# Patient Record
Sex: MALE | Race: BLACK OR AFRICAN AMERICAN | NOT HISPANIC OR LATINO
[De-identification: names, ages, dates, MRNs, and addresses within clinical notes are randomized per-mention and may not be internally consistent; named-entity substitution may affect disease eponyms.]

---

## 2018-01-09 ENCOUNTER — TRANSCRIPTION ENCOUNTER (OUTPATIENT)
Age: 58
End: 2018-01-09

## 2018-11-10 ENCOUNTER — TRANSCRIPTION ENCOUNTER (OUTPATIENT)
Age: 58
End: 2018-11-10

## 2018-12-21 ENCOUNTER — TRANSCRIPTION ENCOUNTER (OUTPATIENT)
Age: 58
End: 2018-12-21

## 2018-12-26 ENCOUNTER — EMERGENCY (EMERGENCY)
Facility: HOSPITAL | Age: 58
LOS: 0 days | Discharge: HOME | End: 2018-12-27
Attending: EMERGENCY MEDICINE | Admitting: EMERGENCY MEDICINE
Payer: COMMERCIAL

## 2018-12-26 VITALS
DIASTOLIC BLOOD PRESSURE: 86 MMHG | RESPIRATION RATE: 21 BRPM | OXYGEN SATURATION: 96 % | SYSTOLIC BLOOD PRESSURE: 164 MMHG | TEMPERATURE: 99 F | HEIGHT: 69 IN | HEART RATE: 104 BPM | WEIGHT: 250 LBS

## 2018-12-26 DIAGNOSIS — Z79.51 LONG TERM (CURRENT) USE OF INHALED STEROIDS: ICD-10-CM

## 2018-12-26 DIAGNOSIS — Z79.52 LONG TERM (CURRENT) USE OF SYSTEMIC STEROIDS: ICD-10-CM

## 2018-12-26 DIAGNOSIS — K21.9 GASTRO-ESOPHAGEAL REFLUX DISEASE WITHOUT ESOPHAGITIS: ICD-10-CM

## 2018-12-26 DIAGNOSIS — R74.0 NONSPECIFIC ELEVATION OF LEVELS OF TRANSAMINASE AND LACTIC ACID DEHYDROGENASE [LDH]: ICD-10-CM

## 2018-12-26 DIAGNOSIS — E11.9 TYPE 2 DIABETES MELLITUS WITHOUT COMPLICATIONS: ICD-10-CM

## 2018-12-26 DIAGNOSIS — R07.9 CHEST PAIN, UNSPECIFIED: ICD-10-CM

## 2018-12-26 DIAGNOSIS — I10 ESSENTIAL (PRIMARY) HYPERTENSION: ICD-10-CM

## 2018-12-26 DIAGNOSIS — R05 COUGH: ICD-10-CM

## 2018-12-26 DIAGNOSIS — Z79.02 LONG TERM (CURRENT) USE OF ANTITHROMBOTICS/ANTIPLATELETS: ICD-10-CM

## 2018-12-26 LAB
ALBUMIN SERPL ELPH-MCNC: 3.5 G/DL — SIGNIFICANT CHANGE UP (ref 3.3–5)
ALP SERPL-CCNC: 185 U/L — HIGH (ref 40–120)
ALT FLD-CCNC: 98 U/L — HIGH (ref 12–78)
ANION GAP SERPL CALC-SCNC: 9 MMOL/L — SIGNIFICANT CHANGE UP (ref 5–17)
AST SERPL-CCNC: 77 U/L — HIGH (ref 15–37)
BASOPHILS # BLD AUTO: 0.02 K/UL — SIGNIFICANT CHANGE UP (ref 0–0.2)
BASOPHILS NFR BLD AUTO: 0.2 % — SIGNIFICANT CHANGE UP (ref 0–2)
BILIRUB SERPL-MCNC: 0.3 MG/DL — SIGNIFICANT CHANGE UP (ref 0.2–1.2)
BUN SERPL-MCNC: 17 MG/DL — SIGNIFICANT CHANGE UP (ref 7–23)
CALCIUM SERPL-MCNC: 8.5 MG/DL — SIGNIFICANT CHANGE UP (ref 8.5–10.1)
CHLORIDE SERPL-SCNC: 108 MMOL/L — SIGNIFICANT CHANGE UP (ref 96–108)
CO2 SERPL-SCNC: 23 MMOL/L — SIGNIFICANT CHANGE UP (ref 22–31)
CREAT SERPL-MCNC: 1.37 MG/DL — HIGH (ref 0.5–1.3)
EOSINOPHIL # BLD AUTO: 0 K/UL — SIGNIFICANT CHANGE UP (ref 0–0.5)
EOSINOPHIL NFR BLD AUTO: 0 % — SIGNIFICANT CHANGE UP (ref 0–6)
GLUCOSE SERPL-MCNC: 278 MG/DL — HIGH (ref 70–99)
HCT VFR BLD CALC: 40.2 % — SIGNIFICANT CHANGE UP (ref 39–50)
HGB BLD-MCNC: 13.3 G/DL — SIGNIFICANT CHANGE UP (ref 13–17)
IMM GRANULOCYTES NFR BLD AUTO: 0.8 % — SIGNIFICANT CHANGE UP (ref 0–1.5)
INR BLD: 1.06 RATIO — SIGNIFICANT CHANGE UP (ref 0.88–1.16)
LYMPHOCYTES # BLD AUTO: 1.46 K/UL — SIGNIFICANT CHANGE UP (ref 1–3.3)
LYMPHOCYTES # BLD AUTO: 12.2 % — LOW (ref 13–44)
MCHC RBC-ENTMCNC: 27 PG — SIGNIFICANT CHANGE UP (ref 27–34)
MCHC RBC-ENTMCNC: 33.1 GM/DL — SIGNIFICANT CHANGE UP (ref 32–36)
MCV RBC AUTO: 81.5 FL — SIGNIFICANT CHANGE UP (ref 80–100)
MONOCYTES # BLD AUTO: 0.82 K/UL — SIGNIFICANT CHANGE UP (ref 0–0.9)
MONOCYTES NFR BLD AUTO: 6.8 % — SIGNIFICANT CHANGE UP (ref 2–14)
NEUTROPHILS # BLD AUTO: 9.6 K/UL — HIGH (ref 1.8–7.4)
NEUTROPHILS NFR BLD AUTO: 80 % — HIGH (ref 43–77)
NRBC # BLD: 0 /100 WBCS — SIGNIFICANT CHANGE UP (ref 0–0)
PLATELET # BLD AUTO: 300 K/UL — SIGNIFICANT CHANGE UP (ref 150–400)
POTASSIUM SERPL-MCNC: 4.4 MMOL/L — SIGNIFICANT CHANGE UP (ref 3.5–5.3)
POTASSIUM SERPL-SCNC: 4.4 MMOL/L — SIGNIFICANT CHANGE UP (ref 3.5–5.3)
PROT SERPL-MCNC: 7 GM/DL — SIGNIFICANT CHANGE UP (ref 6–8.3)
PROTHROM AB SERPL-ACNC: 11.8 SEC — SIGNIFICANT CHANGE UP (ref 10–12.9)
RBC # BLD: 4.93 M/UL — SIGNIFICANT CHANGE UP (ref 4.2–5.8)
RBC # FLD: 14.6 % — HIGH (ref 10.3–14.5)
SODIUM SERPL-SCNC: 140 MMOL/L — SIGNIFICANT CHANGE UP (ref 135–145)
TROPONIN I SERPL-MCNC: 0.02 NG/ML — SIGNIFICANT CHANGE UP (ref 0.01–0.04)
TROPONIN I SERPL-MCNC: <0.015 NG/ML — SIGNIFICANT CHANGE UP (ref 0.01–0.04)
WBC # BLD: 11.99 K/UL — HIGH (ref 3.8–10.5)
WBC # FLD AUTO: 11.99 K/UL — HIGH (ref 3.8–10.5)

## 2018-12-26 PROCEDURE — 71046 X-RAY EXAM CHEST 2 VIEWS: CPT | Mod: 26

## 2018-12-26 PROCEDURE — 99284 EMERGENCY DEPT VISIT MOD MDM: CPT

## 2018-12-26 PROCEDURE — 93010 ELECTROCARDIOGRAM REPORT: CPT

## 2018-12-26 RX ORDER — IPRATROPIUM/ALBUTEROL SULFATE 18-103MCG
3 AEROSOL WITH ADAPTER (GRAM) INHALATION ONCE
Qty: 0 | Refills: 0 | Status: COMPLETED | OUTPATIENT
Start: 2018-12-26 | End: 2018-12-26

## 2018-12-26 RX ADMIN — Medication 3 MILLILITER(S): at 20:29

## 2018-12-26 NOTE — ED STATDOCS - MEDICAL DECISION MAKING DETAILS
Pt with viral syndrome.  Hyperglycemia, no DKA.  And mild transaminitis.  CXR clear.  Troponin x 2 negative.  EKG just sinus tachy, nonischemic.  Given duoneb with improvement in cough and breathing.  Chest pain likely muscular from coughing.  Discussed with pt's PCP, plan to continue meds, f/u as outpatient.

## 2018-12-26 NOTE — ED STATDOCS - PROGRESS NOTE DETAILS
Patient seen and evaluated, ED attending note and orders reviewed, will continue with patient follow up and care -Ashley Ramos PA-C PCP Dr. Gray [phone] would like to be called with results of troponin.    Ashley Ramos PA-C troponin 1 negative, will await second troponin   Ashley Ramos PA-C second troponin negative.  Dr. Ozuna spoke with patient pmd about transaminitis, he will repeat tests outpatient.  reviewed all results with patient as well as return precatuions -Red Carranza PA-C

## 2018-12-26 NOTE — ED ADULT TRIAGE NOTE - CHIEF COMPLAINT QUOTE
pt ambulatory to ED, states dyspnea , chest pain and congestion since last night worsening today. states he went to PMD and was dx w/ RSV.

## 2018-12-26 NOTE — ED STATDOCS - OBJECTIVE STATEMENT
57 y/o male with a PMHx of DM, sleep apnea, HTN, GERD presents to the ED c/o congestion, rhinorrhea, and body aches x1 week. Pt notes he was recently diagnosed with RSV by his PCP. Pt reports he had onset of some chest tightness and SOB starting yesterday, worsening today. Denies decreased PO intake. Pt states he has been having HAs but none today. Pt called PCP who advised to come to the ED. Pt has been taking Mucinex PM, pearls, Azithromycin, nebulizer, albuterol Last CXR last week. Also states an elevated BGM of 267, normally at 120. PCP Dr. Gray [phone]

## 2018-12-26 NOTE — ED ADULT NURSE NOTE - OBJECTIVE STATEMENT
pt presents to ed with chest pain/sob when coughing. pt was diagnosed with RSV at his pulmonologist. pt has history of asthma. pt at moment endorses chest congestion. pt denies dizziness/n/v. pt appears well, is not diaphoretic. pt presents to ed with chest pain/sob when coughing since last night. pt was diagnosed with RSV at his pulmonologist. pt has history of asthma. pt at moment endorses chest congestion. pt denies dizziness/n/v. pt appears well, is not diaphoretic.

## 2018-12-27 VITALS
HEART RATE: 90 BPM | SYSTOLIC BLOOD PRESSURE: 141 MMHG | DIASTOLIC BLOOD PRESSURE: 70 MMHG | OXYGEN SATURATION: 96 % | TEMPERATURE: 98 F | RESPIRATION RATE: 17 BRPM

## 2019-01-19 ENCOUNTER — INPATIENT (INPATIENT)
Facility: HOSPITAL | Age: 59
LOS: 1 days | Discharge: HOME | DRG: 159 | End: 2019-01-21
Attending: INTERNAL MEDICINE | Admitting: INTERNAL MEDICINE
Payer: COMMERCIAL

## 2019-01-19 VITALS
DIASTOLIC BLOOD PRESSURE: 89 MMHG | WEIGHT: 250 LBS | HEART RATE: 99 BPM | HEIGHT: 70 IN | SYSTOLIC BLOOD PRESSURE: 142 MMHG | OXYGEN SATURATION: 96 % | RESPIRATION RATE: 20 BRPM | TEMPERATURE: 98 F

## 2019-01-19 DIAGNOSIS — G47.33 OBSTRUCTIVE SLEEP APNEA (ADULT) (PEDIATRIC): ICD-10-CM

## 2019-01-19 DIAGNOSIS — J02.9 ACUTE PHARYNGITIS, UNSPECIFIED: ICD-10-CM

## 2019-01-19 DIAGNOSIS — J45.991 COUGH VARIANT ASTHMA: ICD-10-CM

## 2019-01-19 DIAGNOSIS — Z90.49 ACQUIRED ABSENCE OF OTHER SPECIFIED PARTS OF DIGESTIVE TRACT: Chronic | ICD-10-CM

## 2019-01-19 DIAGNOSIS — J38.7 OTHER DISEASES OF LARYNX: ICD-10-CM

## 2019-01-19 DIAGNOSIS — I10 ESSENTIAL (PRIMARY) HYPERTENSION: ICD-10-CM

## 2019-01-19 DIAGNOSIS — E11.65 TYPE 2 DIABETES MELLITUS WITH HYPERGLYCEMIA: ICD-10-CM

## 2019-01-19 DIAGNOSIS — R94.31 ABNORMAL ELECTROCARDIOGRAM [ECG] [EKG]: ICD-10-CM

## 2019-01-19 PROBLEM — K21.9 GASTRO-ESOPHAGEAL REFLUX DISEASE WITHOUT ESOPHAGITIS: Chronic | Status: ACTIVE | Noted: 2018-12-27

## 2019-01-19 LAB
ALBUMIN SERPL ELPH-MCNC: 3.8 G/DL — SIGNIFICANT CHANGE UP (ref 3.3–5)
ALP SERPL-CCNC: 141 U/L — HIGH (ref 40–120)
ALT FLD-CCNC: 33 U/L — SIGNIFICANT CHANGE UP (ref 10–45)
ANION GAP SERPL CALC-SCNC: 11 MMOL/L — SIGNIFICANT CHANGE UP (ref 5–17)
APTT BLD: 28.6 SEC — SIGNIFICANT CHANGE UP (ref 27.5–36.3)
AST SERPL-CCNC: 17 U/L — SIGNIFICANT CHANGE UP (ref 10–40)
B-OH-BUTYR SERPL-SCNC: 0 MMOL/L — SIGNIFICANT CHANGE UP
BASOPHILS # BLD AUTO: 0 K/UL — SIGNIFICANT CHANGE UP (ref 0–0.2)
BASOPHILS NFR BLD AUTO: 0.5 % — SIGNIFICANT CHANGE UP (ref 0–2)
BILIRUB SERPL-MCNC: 0.2 MG/DL — SIGNIFICANT CHANGE UP (ref 0.2–1.2)
BUN SERPL-MCNC: 17 MG/DL — SIGNIFICANT CHANGE UP (ref 7–23)
CALCIUM SERPL-MCNC: 9.2 MG/DL — SIGNIFICANT CHANGE UP (ref 8.4–10.5)
CHLORIDE SERPL-SCNC: 104 MMOL/L — SIGNIFICANT CHANGE UP (ref 96–108)
CO2 SERPL-SCNC: 27 MMOL/L — SIGNIFICANT CHANGE UP (ref 22–31)
CREAT SERPL-MCNC: 1.39 MG/DL — HIGH (ref 0.5–1.3)
EOSINOPHIL # BLD AUTO: 0.1 K/UL — SIGNIFICANT CHANGE UP (ref 0–0.5)
EOSINOPHIL NFR BLD AUTO: 1.2 % — SIGNIFICANT CHANGE UP (ref 0–6)
GAS PNL BLDV: SIGNIFICANT CHANGE UP
GLUCOSE BLDC GLUCOMTR-MCNC: 110 MG/DL — HIGH (ref 70–99)
GLUCOSE SERPL-MCNC: 117 MG/DL — HIGH (ref 70–99)
HCT VFR BLD CALC: 44.2 % — SIGNIFICANT CHANGE UP (ref 39–50)
HGB BLD-MCNC: 14.3 G/DL — SIGNIFICANT CHANGE UP (ref 13–17)
INR BLD: 0.96 RATIO — SIGNIFICANT CHANGE UP (ref 0.88–1.16)
LYMPHOCYTES # BLD AUTO: 3.2 K/UL — SIGNIFICANT CHANGE UP (ref 1–3.3)
LYMPHOCYTES # BLD AUTO: 34 % — SIGNIFICANT CHANGE UP (ref 13–44)
MCHC RBC-ENTMCNC: 27 PG — SIGNIFICANT CHANGE UP (ref 27–34)
MCHC RBC-ENTMCNC: 32.4 GM/DL — SIGNIFICANT CHANGE UP (ref 32–36)
MCV RBC AUTO: 83.3 FL — SIGNIFICANT CHANGE UP (ref 80–100)
MONOCYTES # BLD AUTO: 1 K/UL — HIGH (ref 0–0.9)
MONOCYTES NFR BLD AUTO: 10.8 % — SIGNIFICANT CHANGE UP (ref 2–14)
NEUTROPHILS # BLD AUTO: 5 K/UL — SIGNIFICANT CHANGE UP (ref 1.8–7.4)
NEUTROPHILS NFR BLD AUTO: 53.6 % — SIGNIFICANT CHANGE UP (ref 43–77)
PLATELET # BLD AUTO: 258 K/UL — SIGNIFICANT CHANGE UP (ref 150–400)
POTASSIUM SERPL-MCNC: 4.4 MMOL/L — SIGNIFICANT CHANGE UP (ref 3.5–5.3)
POTASSIUM SERPL-SCNC: 4.4 MMOL/L — SIGNIFICANT CHANGE UP (ref 3.5–5.3)
PROT SERPL-MCNC: 6.6 G/DL — SIGNIFICANT CHANGE UP (ref 6–8.3)
PROTHROM AB SERPL-ACNC: 10.9 SEC — SIGNIFICANT CHANGE UP (ref 10–12.9)
RAPID RVP RESULT: SIGNIFICANT CHANGE UP
RBC # BLD: 5.3 M/UL — SIGNIFICANT CHANGE UP (ref 4.2–5.8)
RBC # FLD: 14.4 % — SIGNIFICANT CHANGE UP (ref 10.3–14.5)
SODIUM SERPL-SCNC: 142 MMOL/L — SIGNIFICANT CHANGE UP (ref 135–145)
TROPONIN T, HIGH SENSITIVITY RESULT: 25 NG/L — SIGNIFICANT CHANGE UP (ref 0–51)
WBC # BLD: 9.4 K/UL — SIGNIFICANT CHANGE UP (ref 3.8–10.5)
WBC # FLD AUTO: 9.4 K/UL — SIGNIFICANT CHANGE UP (ref 3.8–10.5)

## 2019-01-19 PROCEDURE — 93010 ELECTROCARDIOGRAM REPORT: CPT | Mod: NC

## 2019-01-19 PROCEDURE — 99223 1ST HOSP IP/OBS HIGH 75: CPT

## 2019-01-19 PROCEDURE — 99285 EMERGENCY DEPT VISIT HI MDM: CPT | Mod: 25

## 2019-01-19 PROCEDURE — 71046 X-RAY EXAM CHEST 2 VIEWS: CPT | Mod: 26

## 2019-01-19 PROCEDURE — 70491 CT SOFT TISSUE NECK W/DYE: CPT | Mod: 26

## 2019-01-19 RX ORDER — DEXTROSE 50 % IN WATER 50 %
12.5 SYRINGE (ML) INTRAVENOUS ONCE
Qty: 0 | Refills: 0 | Status: DISCONTINUED | OUTPATIENT
Start: 2019-01-19 | End: 2019-01-21

## 2019-01-19 RX ORDER — INSULIN LISPRO 100/ML
1 VIAL (ML) SUBCUTANEOUS
Qty: 0 | Refills: 0 | Status: DISCONTINUED | OUTPATIENT
Start: 2019-01-19 | End: 2019-01-20

## 2019-01-19 RX ORDER — GLUCAGON INJECTION, SOLUTION 0.5 MG/.1ML
1 INJECTION, SOLUTION SUBCUTANEOUS ONCE
Qty: 0 | Refills: 0 | Status: DISCONTINUED | OUTPATIENT
Start: 2019-01-19 | End: 2019-01-21

## 2019-01-19 RX ORDER — DEXTROSE 50 % IN WATER 50 %
25 SYRINGE (ML) INTRAVENOUS ONCE
Qty: 0 | Refills: 0 | Status: DISCONTINUED | OUTPATIENT
Start: 2019-01-19 | End: 2019-01-21

## 2019-01-19 RX ORDER — LIDOCAINE 4 G/100G
10 CREAM TOPICAL EVERY 6 HOURS
Qty: 0 | Refills: 0 | Status: DISCONTINUED | OUTPATIENT
Start: 2019-01-19 | End: 2019-01-21

## 2019-01-19 RX ORDER — PANTOPRAZOLE SODIUM 20 MG/1
40 TABLET, DELAYED RELEASE ORAL
Qty: 0 | Refills: 0 | Status: DISCONTINUED | OUTPATIENT
Start: 2019-01-19 | End: 2019-01-21

## 2019-01-19 RX ORDER — DEXTROSE 50 % IN WATER 50 %
15 SYRINGE (ML) INTRAVENOUS ONCE
Qty: 0 | Refills: 0 | Status: DISCONTINUED | OUTPATIENT
Start: 2019-01-19 | End: 2019-01-21

## 2019-01-19 RX ORDER — LIDOCAINE 4 G/100G
10 CREAM TOPICAL ONCE
Qty: 0 | Refills: 0 | Status: COMPLETED | OUTPATIENT
Start: 2019-01-19 | End: 2019-01-19

## 2019-01-19 RX ORDER — BUDESONIDE AND FORMOTEROL FUMARATE DIHYDRATE 160; 4.5 UG/1; UG/1
2 AEROSOL RESPIRATORY (INHALATION)
Qty: 0 | Refills: 0 | Status: DISCONTINUED | OUTPATIENT
Start: 2019-01-19 | End: 2019-01-21

## 2019-01-19 RX ORDER — ASPIRIN/CALCIUM CARB/MAGNESIUM 324 MG
324 TABLET ORAL ONCE
Qty: 0 | Refills: 0 | Status: COMPLETED | OUTPATIENT
Start: 2019-01-19 | End: 2019-01-19

## 2019-01-19 RX ORDER — SODIUM CHLORIDE 9 MG/ML
1000 INJECTION, SOLUTION INTRAVENOUS
Qty: 0 | Refills: 0 | Status: DISCONTINUED | OUTPATIENT
Start: 2019-01-19 | End: 2019-01-21

## 2019-01-19 RX ORDER — INSULIN LISPRO 100/ML
VIAL (ML) SUBCUTANEOUS
Qty: 0 | Refills: 0 | Status: DISCONTINUED | OUTPATIENT
Start: 2019-01-19 | End: 2019-01-19

## 2019-01-19 RX ORDER — LOSARTAN POTASSIUM 100 MG/1
100 TABLET, FILM COATED ORAL DAILY
Qty: 0 | Refills: 0 | Status: DISCONTINUED | OUTPATIENT
Start: 2019-01-19 | End: 2019-01-21

## 2019-01-19 RX ORDER — SODIUM CHLORIDE 9 MG/ML
1000 INJECTION INTRAMUSCULAR; INTRAVENOUS; SUBCUTANEOUS ONCE
Qty: 0 | Refills: 0 | Status: COMPLETED | OUTPATIENT
Start: 2019-01-19 | End: 2019-01-19

## 2019-01-19 RX ORDER — CEFTRIAXONE 500 MG/1
1 INJECTION, POWDER, FOR SOLUTION INTRAMUSCULAR; INTRAVENOUS ONCE
Qty: 0 | Refills: 0 | Status: COMPLETED | OUTPATIENT
Start: 2019-01-19 | End: 2019-01-19

## 2019-01-19 RX ADMIN — LIDOCAINE 10 MILLILITER(S): 4 CREAM TOPICAL at 16:55

## 2019-01-19 RX ADMIN — LIDOCAINE 10 MILLILITER(S): 4 CREAM TOPICAL at 23:00

## 2019-01-19 RX ADMIN — CEFTRIAXONE 1 GRAM(S): 500 INJECTION, POWDER, FOR SOLUTION INTRAMUSCULAR; INTRAVENOUS at 13:48

## 2019-01-19 RX ADMIN — CEFTRIAXONE 100 GRAM(S): 500 INJECTION, POWDER, FOR SOLUTION INTRAMUSCULAR; INTRAVENOUS at 12:26

## 2019-01-19 RX ADMIN — LIDOCAINE 10 MILLILITER(S): 4 CREAM TOPICAL at 12:22

## 2019-01-19 RX ADMIN — Medication 324 MILLIGRAM(S): at 12:26

## 2019-01-19 RX ADMIN — SODIUM CHLORIDE 1000 MILLILITER(S): 9 INJECTION INTRAMUSCULAR; INTRAVENOUS; SUBCUTANEOUS at 10:57

## 2019-01-19 RX ADMIN — SODIUM CHLORIDE 1000 MILLILITER(S): 9 INJECTION INTRAMUSCULAR; INTRAVENOUS; SUBCUTANEOUS at 10:04

## 2019-01-19 NOTE — ED ADULT NURSE NOTE - OBJECTIVE STATEMENT
Patient   is  alert  an d oriented  x3.  Color  is  good  and skin warm to touch.  He  is  c/o  sore throat    and  cough  with clear  sputum.   He  has  been  afebrile.

## 2019-01-19 NOTE — ED PROVIDER NOTE - ATTENDING CONTRIBUTION TO CARE
57 y/o M asthma and diabetic with worsening cough and now sore throat x3 days. Recent severe asthma exacerbation on prednisone x3 weeks and no improvement throat pain after nystatin. PE remarkable for significantly erythematous posterior pharynx. Labs, CXR, beta hydroxy, RVP, CT neck soft tissue, IVF pain control prn and reassess. NATALEE.

## 2019-01-19 NOTE — ED ADULT NURSE NOTE - NSIMPLEMENTINTERV_GEN_ALL_ED
Implemented All Universal Safety Interventions:  [place] to call system. Call bell, personal items and telephone within reach. Instruct patient to call for assistance. Room bathroom lighting operational. Non-slip footwear when patient is off stretcher. Physically safe environment: no spills, clutter or unnecessary equipment. Stretcher in lowest position, wheels locked, appropriate side rails in place.

## 2019-01-19 NOTE — H&P ADULT - HISTORY OF PRESENT ILLNESS
58 yr old M w/a PMH of Insulin dependent diabetes mellitus on a insulin pump, HTN, cough variant asthma presents with throat pain.  Around yanet patient with diagnosed with RSV infection and subsequently had an exacerbation of asthma and since this time has been taking fluctuating doses of steroids depending upon symptoms.  He states three days ago he began experiencing throat pain making it difficult to swallow.  Was prescribed nystatin without significant relief

## 2019-01-19 NOTE — ED PROVIDER NOTE - NS ED ROS FT
Constitutional: No fever +chills  Eyes: No visual changes, eye pain or redness  HEENT: +throat pain. ear pain, nasal pain. No nose bleeding.  CV: No chest pain or lower extremity edema  Resp: shortness of breath, +cough  GI: No abd pain. No nausea or vomiting. No diarrhea. No constipation.   : No dysuria, hematuria.   MSK: No musculoskeletal pain  Skin: No rash  Neuro: No headache. No numbness or tingling. No weakness.

## 2019-01-19 NOTE — CONSULT NOTE ADULT - SUBJECTIVE AND OBJECTIVE BOX
HELADIO PEDERSON  [id number]  01-19-19 @ 14:16  -------------------------------------------------------------------------------  [facility] - [facility]  CONSULT NOTE    The patient is a very pleasant gentleman well known to us for asthmatic bronchitis and severe obstructive sleep apnea.  He has recently treated by my colleague Dr. Sergio Gray for a significant bout of asthmatic bronchitis requiring nebulizer treatments in the setting of RSV infection.  He has been definitively improving.    Over the past few days he has had increasing throat discomfort and swallowing pain despite improvement in his pulmonary status and this got to the point that he presented to the emergency room.  He is undergoing otolaryngology evaluation and CT scanning.  He also reportedly has some EKG changes.    He denies chest pain or acute change in exercise tolerance.  He is down to 20 mg of prednisone.    He is presently comfortable in the emergency room and his wife accompanies him.    On review of systems he denies sputum production, fever or chills, hemoptysis, neck stiffness or discomfort.  Further review of systems is unremarkable.    He has been regularly using his CPAP device and is very comfortable with it.    -------------------------------------------------------------------------------  PMHX:  GERD (gastroesophageal reflux disease)  HTN (hypertension)  DM (diabetes mellitus)  [place]    PSHX:    FAMILY HISTORY:    SOCIAL HISTORY:  non-smoker    Pulmonary Medications:       Antimicrobials:      Cardiology:      Other:      Allergies    penicillins (Rash)    Intolerances        HOME MEDICATIONS: see  H & P    REVIEW OF SYSTEMS:  Constitutional: As per HPI  HEENT: Within normal limits  CV: As per HPI  Resp: As per HPI  GI: Within normal limits   : Within normal limits  Musculoskeletal: Within normal limits  Skin: Within normal limits  Neurological: Within normal limits  Psychiatric: Within normal limits  Endocrine: Within normal limits  Hematologic/Lymphatic: Within normal limits  Allergic/Immunologic: Within normal limits    [ ] All other systems negative    [ ] Unable to assess ROS because ________                                                                                                                                                                              [ ]Unable to obtain  ________________________________________________________________________  OBJECTIVE:      I&O's Detail      Daily Height in cm: 177.8 (19 Jan 2019 08:58)    Daily     CAPILLARY BLOOD GLUCOSE    _______________________________________________________________________  PHYSICAL EXAM:  ICU Vital Signs Last 24 Hrs  T(C): 37.3 (19 Jan 2019 09:21), Max: 37.3 (19 Jan 2019 09:21)  T(F): 99.1 (19 Jan 2019 09:21), Max: 99.1 (19 Jan 2019 09:21)  HR: 96 (19 Jan 2019 09:21) (96 - 99)  BP: 148/91 (19 Jan 2019 09:21) (142/89 - 148/91)  BP(mean): --  ABP: --  ABP(mean): --  RR: 19 (19 Jan 2019 09:21) (19 - 20)  SpO2: 99% (19 Jan 2019 09:21) (96% - 99%)      Well-appearing, no acute distress  Alert and oriented x 3  HEENT: mallampati 4, erythematous oropharynx.  Neck No JVD, stridor, adenopathy or thyromegaly  Oropharynx is mallampati class 4  Cardiac regular, without m/r/g  Lungs with coarse breath sounds and minimal wheezing on forced expiration  Abdomen is soft and nontender, nondistended  Extrems are warm, without clubbing, cyanosis or edema  Neurological is grossly intact, nonfocal  _____________________________________________________________________    LABS:                        14.3   9.4   )-----------( 258      ( 19 Jan 2019 10:10 )             44.2     01-19    142  |  104  |  17  ----------------------------<  117<H>  4.4   |  27  |  1.39<H>    Ca      9.2      01-19      TPro  6.6  /  Alb  3.8  /  TBili  0.2  /  DBili  x   /  AST  17  /  ALT  33  /  AlkPhos  141<H>  01-19    PT/INR - ( 19 Jan 2019 10:10 )   PT: 10.9 sec;   INR: 0.96 ratio         PTT - ( 19 Jan 2019 10:10 )  PTT:28.6 sec    Venous Blood Gas:  01-19 @ 10:10  7.34/60/24/32/34  VBG Lactate: 1.2    CARDIAC MARKERS ( 19 Jan 2019 10:10 )  x     / x     / 372 U/L / x     / 3.6 ng/mL    MICROBIOLOGY:     RADIOLOGY:  [x ] Reviewed and interpreted by me  _____________________________________________________________________    IMPRESSION AND RECOMMENDATIONS:    From a pulmonary perspective, he has been significantly improving, his chest x-ray and examination are relatively unremarkable and we will defer management to otolaryngology, cardiology and the primary service.    He can use DuoNeb via the nebulizer on a every 6 hour when necessary basis and should continue prednisone at 20 mg daily for now with a slow taper.    He also has severe obstructive sleep apnea requiring nightly use of CPAP.  He uses an auto CPAP device with a pressure range of 7-12 cm water and a nasal pillows interface as an outpatient.  While in the hospital he can use CPAP at 8 cm water with a nasal interface and the head of the bed elevated at greater than 30°.    Please do not hesitate to call if you have questions or if we may be of further assistance.    Thank you for the courtesy of this referral    Ramses Fonseca MD, FCCP, FAA  Pulmonary and Sleep Medicine  [phone]

## 2019-01-19 NOTE — ED PROVIDER NOTE - OBJECTIVE STATEMENT
57 y/o M pmhx asthma, recent exacerbation complicated by RSV, DM2, presenting with worsening productive cough and sore throat. Patient reports that his asthma has been significantly worsening since [place], has been on prednisone x3 weeks and increased his asthma regimen to daily inhalers as well, but has not noted improvement. Reports approximately 3 days ago he began to have significant, severe throat pain that is not improving. He states that he has pain with swallowing but no difficulty. His pulmonologist started him on nystatin swish and swallow, but has not noted any improvement. Denies fevers, but has not checked his temp at home and reports chills. Denies any vomiting. 59 y/o M pmhx asthma, recent exacerbation complicated by RSV, DM2, presenting with worsening productive cough and sore throat. Patient reports that his asthma has been significantly worsening since [place], has been on prednisone x3 weeks and increased his asthma regimen to daily inhalers as well, but has not noted improvement. Reports approximately 3 days ago he began to have significant, severe throat pain that is not improving. He states that he has pain with swallowing but no difficulty. His pulmonologist started him on nystatin swish and swallow, but has not noted any improvement. Denies fevers, but has not checked his temp at home and reports chills. Denies any vomiting.    primary care doctor dr Park

## 2019-01-19 NOTE — H&P ADULT - NSHPLABSRESULTS_GEN_ALL_CORE
LABS:                        14.3   9.4   )-----------( 258      ( 19 Jan 2019 10:10 )             44.2     01-19    142  |  104  |  17  ----------------------------<  117<H>  4.4   |  27  |  1.39<H>    Ca    9.2      19 Jan 2019 10:10    TPro  6.6  /  Alb  3.8  /  TBili  0.2  /  DBili  x   /  AST  17  /  ALT  33  /  AlkPhos  141<H>  01-19    PT/INR - ( 19 Jan 2019 10:10 )   PT: 10.9 sec;   INR: 0.96 ratio         PTT - ( 19 Jan 2019 10:10 )  PTT:28.6 sec

## 2019-01-19 NOTE — H&P ADULT - PROBLEM SELECTOR PLAN 1
No exudates seen.  Patient with trial of nystatin without relief   CT of neck negative.    Afebrile no WBC count.      As per ENT evaluation, presence of apthous ulcers.  Supportive care/continue lidocaine PRN

## 2019-01-19 NOTE — CONSULT NOTE ADULT - ASSESSMENT
Assessment:  59 y/o M pmhx asthma, recent exacerbation complicated by RSV, DM2, presenting with worsening productive cough and sore throat. Ddx acute pharyngitis, acute sinusitis with postnasal drip and Viral pharyngeal punctate ulcers (?HSV) and GERD    Plan:  1) magic mouthwash prn sore throat  2) consider antivirals  3) PPI

## 2019-01-19 NOTE — ED PROVIDER NOTE - PHYSICAL EXAMINATION
GEN: Well Appearing, Nontoxic, NAD  HEENT: NC/AT, Symm Facies. PERRL, EOMI, MMM, +moderate erythema and mild edema of posterior pharynx with no signs of airway compromise.   CV: No JVD/Bruits or stridor;  +S1S2, RRR w/o m/g/r  RESP: mild diffuse expiratory wheeze  ABD: Soft, nt/nd, +BS. No guarding/rebound. No RUQ tender, no CVAT  EXT/MSK: No lower extremity edema or calf tenderness. WWP, palpable pulses. FROMx4  SKIN: No erythema, lesions or rash  Neuro: Grossly intact, AOX3 with normal speech, CN II-XII intact; Sensation intact, motor 5/5 throughout. Gait normal

## 2019-01-19 NOTE — PATIENT PROFILE ADULT - VISION (WITH CORRECTIVE LENSES IF THE PATIENT USUALLY WEARS THEM):
glasses with him/Partially impaired: cannot see medication labels or newsprint, but can see obstacles in path, and the surrounding layout; can count fingers at arm's length

## 2019-01-19 NOTE — CONSULT NOTE ADULT - SUBJECTIVE AND OBJECTIVE BOX
HPI: 59 y/o M pmhx asthma, recent exacerbation complicated by RSV, DM2, presenting with worsening productive cough and sore throat. Patient reports that his asthma has been significantly worsening since [place], has been on prednisone x3 weeks and increased his asthma regimen to daily inhalers as well, but has not noted improvement. Reports approximately 3 days ago he began to have significant, severe throat pain that is not improving. He states that he has pain with swallowing but no difficulty. His pulmonologist started him on nystatin swish and swallow, but has not noted any improvement. Denies fevers, but has not checked his temp at home and reports chills. Denies any vomiting.    primary care doctor dr Park    PAST MEDICAL:  GERD (gastroesophageal reflux disease)  HTN (hypertension)  DM (diabetes mellitus)     Past Surgical History:  S/P appendectomy.     Family History:  Mother  Still living? Unknown  Family history of coronary artery disease in mother, Age at diagnosis: Age Unknown.     Social History:  Social History (marital status, living situation, occupation, tobacco use, alcohol and drug use, and sexual history): Non smoker  No ETOH no illicit drug use	     Tobacco Screening:  · Core Measure Site	No	  · Has the patient used tobacco in the past 30 days?	No	    Risk Assessment:    Present on Admission:  Deep Venous Thrombosis	no	  Pulmonary Embolus	no	    Allergies and Intolerances:        Allergies:  	penicillins: Drug Category, Rash    Home Medications:   * Patient Currently Takes Medications as of 19-Jan-2019 14:40 documented in Structured Notes  · 	ezetimibe 10 mg oral tablet: 1 tab(s) orally once a day, Last Dose Taken:    · 	predniSONE 20 mg oral tablet: 1 tab(s) orally once a day, Last Dose Taken:    · 	losartan 100 mg oral tablet: 1 tab(s) orally once a day, Last Dose Taken:    · 	Dulera 200 mcg-5 mcg/inh inhalation aerosol: 2 puff(s) inhaled 2 times a day, Last Dose Taken:           Heart Failure:  Does this patient have a history of or has been diagnosed with heart failure? no.    HIV Screen (per [facility], HIV screening must be offered to every individual between ages 13 and 64)	Offered and patient declined	  Hepatitis C Screen (per [facility], hepatitis C screening must be offered to every individual born between 1945 and 1965)	Offered and patient declined	      Vital Signs Last 24 Hrs  T(C): 37.3 (19 Jan 2019 09:21), Max: 37.3 (19 Jan 2019 09:21)  T(F): 99.1 (19 Jan 2019 09:21), Max: 99.1 (19 Jan 2019 09:21)  HR: 96 (19 Jan 2019 09:21) (96 - 99)  BP: 148/91 (19 Jan 2019 09:21) (142/89 - 148/91)  BP(mean): --  RR: 19 (19 Jan 2019 09:21) (19 - 20)  SpO2: 99% (19 Jan 2019 09:21) (96% - 99%)                          14.3   9.4   )-----------( 258      ( 19 Jan 2019 10:10 )             44.2    01-19    142  |  104  |  17  ----------------------------<  117<H>  4.4   |  27  |  1.39<H>    Ca    9.2      19 Jan 2019 10:10    TPro  6.6  /  Alb  3.8  /  TBili  0.2  /  DBili  x   /  AST  17  /  ALT  33  /  AlkPhos  141<H>  01-19   PT/INR - ( 19 Jan 2019 10:10 )   PT: 10.9 sec;   INR: 0.96 ratio         PTT - ( 19 Jan 2019 10:10 )  PTT:28.6 sec    PHYSICAL EXAM:  Gen: NAD  Skin: No rashes, bruises, or lesions  Head: Normocephalic, Atraumatic  Face: no edema, erythema, or fluctuance. Parotid glands soft without mass  Eyes: no scleral injection  Nose: Nares bilaterally patent, no discharge  Nasal/Sinus Endoscopy: maxillary sinus with mucoid fluid b/l via inferior meatus  Mouth: No Stridor / Drooling / Trismus.  Mucosa moist, tongue/uvula midline, oropharynx clear, however large tongue  Neck: Flat, supple, no lymphadenopathy, trachea midline, no masses  Lymphatic: No lymphadenopathy  Resp: breathing easily, no stridor  CV: no peripheral edema/cyanosis  GI: nondistended   Peripheral vascular: no JVD or edema  Neuro: facial nerve intact, no facial droop      Flexible Fiberoptic Indirect laryngoscopy:  (Scope #2 used) Reason for Laryngoscopy: apthous ulcer on posterior pharynx    Patient was unable to cooperate with mirror.  Nasopharynx, oropharynx, and hypopharynx clear, no bleeding. Tongue base, posterior pharyngeal wall, vallecula, epiglottis, and subglottis appear normal. No erythema, edema, pooling of secretions, masses or lesions. Airway patent, no foreign body visualized. No glottic/supraglottic edema. True vocal cords, arytenoids, vestibular folds, ventricles, pyriform sinuses, and aryepiglottic folds appear normal bilaterally. Vocal cords mobile with good contact b/l.    IMAGING/ADDITIONAL STUDIES: < from: CT Neck Soft Tissue w/ IV Cont (01.19.19 @ 11:13) >  IMPRESSION:    No abscesses.    < end of copied text >

## 2019-01-19 NOTE — CONSULT NOTE ADULT - PROBLEM SELECTOR RECOMMENDATION 9
- consider antiviral per primary team   - magic mouth wash   - no acidic foods  - will continue to follow.

## 2019-01-19 NOTE — ED ADULT NURSE NOTE - CHIEF COMPLAINT
Chief complaint:   Chief Complaint   Patient presents with   • Heart Problem   • Follow-up       Vitals:  Visit Vitals  /70   Pulse 100   Ht 5' 2\" (1.575 m)   Wt 107 kg   LMP 01/01/2007   BMI 43.16 kg/m²       HISTORY OF PRESENT ILLNESS     Madelaine Saul is a 58 year old female seen for new patient consultation at the request of Dr. Steven J Heyden, MD for an abnormal holter monitor. PMHx: Hypertension, Hyperlipidemia, Diabetes, and GERD.     She reported on 12/18/2017 she was in to see her endocrinologist. Her HR was documented to be in the 140's. She was asymptomatic but was encouraged to go to the ER for evaluation. ECG showed sinus tachycardia rate 145 bmp. Her heart rate improved on its own without intervention. A holter monitor was obtained to follow-up. This noted PAC and PVC's. It read out that she had a run of Atrial Fib/Flutter and was advised to go to the ER. On review on her monitor it appears she was sinus tachycardia. She reported during the these documented episodes she is asymptomatic. She reports she has chronic dizziness \"all the time\". She does report intermittent episodes of \"charging volts\" in her chest at time but can not pinpoint if it is associated during the times she is tachycardic, she does not experience dizziness with these episodes. She does report a recent change in her over all effort tolerance. She reports dyspnea on exertion with simple tasks and minimal exertion. She feels she has no energy. Shr denied symptoms of chest pain/tightness, syncope/presyncope, orthopnea, or fluid retention. She does report over the last week she has been feeling nauseated and has multiple episodes of emesis. She believes it may be related to the victoza. Denied fever, chills, or body aches.     1/15/18 Lipid Panel: Chol. 233, , HDL 56, .   11/2/17 A1c- 10.6    Nuclear stress test 2/22/18:  IMPRESSION:   1. Normal  myocardial perfusion scans at peak exercise (submaximal heart  rate  but double product 23, 912).   2. Left ventricular function is normal post exercise.    During treadmill portion of stress test patient developed SVT, Adenosine given. Patient converted to NSR. On 2/23, Amlodipine d/c'd. Diltiazem 240mg started. Patient here for follow up. EKG in office. Sinus tachy at 100 bpm on EKG. After patient in office for a while, hr in upper 80's. Patient has had 2 brief rapid heart rate episodes since starting Diltiazem, ~ 5 seconds duration.     [place] [facility] - diagnosed with sleep apnea today. Will get cpap machine.     Other significant problems:  Patient Active Problem List    Diagnosis Date Noted   • Tachycardia 01/22/2018     Priority: Low   • At risk for coronary artery disease 01/22/2018     Priority: Low   • Gastroparesis 02/23/2016     Priority: Low   • Left shoulder pain 01/04/2016     Priority: Low   • Essential hypertension 10/08/2015     Priority: Low   • Type 2 diabetes mellitus with diabetic neuropathy (CMS/Formerly [facility]) 10/08/2015     Priority: Low   • GERD (gastroesophageal reflux disease) 03/27/2015     Priority: Low   • Diabetic neuropathy (CMS/[facility]ly [facility]) 12/17/2013     Priority: Low   • 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 (CMS/Formerly [facility]) 10/30/2013     Priority: Low   • Proteinuria 01/28/2013     Priority: Low   • Pure hypercholesterolemia 01/13/2013     Priority: Low     Class: Chronic   • Connective tissue disease (CMS/[facility]) 01/07/2013     Priority: Low   • Allergic rhinitis      Priority: Low   • Onychomycosis      Priority: Low       PAST MEDICAL, FAMILY AND SOCIAL HISTORY     Medications:  Current Outpatient Prescriptions   Medication   • insulin glargine (LANTUS SOLOSTAR) 100 UNIT/ML pen-injector   • dilTIAZem (CARTIA XT) 240 MG 24 hr capsule   • famotidine (PEPCID) 20 MG tablet   • losartan-hydrochlorothiazide (HYZAAR) 100-25 MG per tablet   • insulin lispro (HUMALOG KWIKPEN) 100 UNIT/ML pen-injector   • rosuvastatin (CRESTOR) 40 MG tablet   • Insulin Pen Needle (B-D  U/F PEN NEEDLE 5/16\") 31G X 8 MM Misc   • Insulin Syringe-Needle U-100 31G X 5/16\" 0.3 ML Misc   • FREESTYLE LITE test strip   • Blood Glucose Monitoring Suppl (FREESTYLE FREEDOM LITE) w/Device Kit   • ondansetron (ZOFRAN ODT) 4 MG disintegrating tablet   • terazosin (HYTRIN) 5 MG capsule   • folic acid (FOLATE) 1 MG tablet   • escitalopram (LEXAPRO) 20 MG tablet     No current facility-administered medications for this visit.      Allergies:  ALLERGIES:   Allergen Reactions   • Gabapentin SWELLING   • Levaquin [Levofloxacin] SHORTNESS OF BREATH and Cough   • Latex SWELLING   • Penicillins VOMITING   • Adhesive Other (See Comments)     Itching all over body   • Januvia [Sitagliptin] VOMITING   • Lipitor [Atorvastatin Calcium] RASH   • Nicotine RASH       Past Medical  History/Surgeries:  Past Medical History:   Diagnosis Date   • Allergic rhinitis    • DM (diabetes mellitus) (CMS/HCC)    • Hypertension    • Onychomycosis    • Pure hypercholesterolemia 1/13/2013   • Rheumatoid Arthritis    • Wears glasses        Past Surgical History:   Procedure Laterality Date   • Appendectomy  1968   • Breast reduction surgery  1982   • Colonoscopy diagnostic  04/22/2013    Dr. Robert Pereira-Suboptimal prep; no gross lesions seen; repeat 04/2018   • Destruc retinal lesn,photocoag  11/7/2013    Focal laser left eye Dr. Isbell   • Destruc retinal lesn,photocoag  11/21/13    right GPS   • Esophagogastroduodenoscopy transoral flex w/bx single or mult  08/27/15    mild esophagitis,Gastroparesis,.    • Foot/toes surgery proc unlisted Left 5/2015    foot surgery   • Hysterectomy  2003    for Fibroid tumors   • Umbilical hernia repair  1970's       Family History:  Family History   Problem Relation Age of Onset   • Cancer Mother      Lymphoma   • Cancer Father      Lymphoma   • Neurological Disorder Sister      2 sisters with MS   • Diabetes Sister    • Diabetes Maternal Grandmother    • Arthritis Maternal Grandmother     • Psychiatry Paternal Grandmother      schizophrenia, bipolar       Social History:  Social History   Substance Use Topics   • Smoking status: Never Smoker   • Smokeless tobacco: Never Used   • Alcohol use 1.2 oz/week     2 Standard drinks or equivalent per week      Comment: drinks about 2 drinks, once per week     REVIEW OF SYSTEMS     Review of Systems   Constitutional: Positive for fatigue.   Respiratory: Negative for chest tightness and shortness of breath.    Cardiovascular: Negative for chest pain, palpitations and leg swelling.   Gastrointestinal: Positive for abdominal pain, nausea and vomiting.   Neurological: Positive for dizziness and light-headedness.       PHYSICAL EXAM     Physical Exam   Constitutional: She appears well-developed and well-nourished.   Cardiovascular: Regular rhythm and normal heart sounds.  Tachycardia present.    Abdominal:   Left upper quadrant tenderness     ASSESSMENT/PLAN       SVT   Continue diltiazem 240mg daily.   Call us with any increase in rapid heart rate episodes.   Follow up in 4 months.         Claire Belle PA-C  3/19/18        The patient is a 58y Male complaining of throat, pain.

## 2019-01-19 NOTE — CONSULT NOTE ADULT - ASSESSMENT
58y with sore throat found to have apthous ulcers on indirect laryngoscopy likely due to viral illness in origin, normal CT.

## 2019-01-19 NOTE — ED PROVIDER NOTE - MEDICAL DECISION MAKING DETAILS
59 y/o M asthma and diabetic with worsening cough and now sore throat x3 days. Recent severe asthma exacerbation on prednisone x3 weeks and no improvement throat pain after nystatin. PE remarkable for significantly erythematous posterior pharynx. Labs, CXR, beta hydroxy, RVP, CT neck soft tissue, IVF pain control prn and reassess. NATALEE. 57 y/o M asthma and diabetic with worsening cough and now sore throat x3 days. Recent severe asthma exacerbation on prednisone x3 weeks and no improvement throat pain after nystatin. PE remarkable for significantly erythematous posterior pharynx. Labs, throat culture  CXR, beta hydroxy, RVP, CT neck soft tissue, IVF pain control prn and reassess. NATALEE.

## 2019-01-19 NOTE — CONSULT NOTE ADULT - SUBJECTIVE AND OBJECTIVE BOX
CC: sore throat     HPI: 57 y/o M pmhx asthma, recent exacerbation complicated by RSV, DM2, presenting with worsening productive cough and sore throat. Patient reports that his asthma has been significantly worsening since [place], has been on prednisone x3 weeks and increased his asthma regimen to daily inhalers as well, but has not noted improvement. Reports approximately 3 days ago he began to have significant, severe throat pain that is not improving. He states that he has pain with swallowing but no difficulty. His pulmonologist started him on nystatin swish and swallow, but has not noted any improvement. Denies fevers, but has not checked his temp at home and reports chills. Denies any vomiting.    primary care doctor dr Park    PAST MEDICAL & SURGICAL HISTORY:  GERD (gastroesophageal reflux disease)  HTN (hypertension)  DM (diabetes mellitus)    Allergies    penicillins (Rash)    Intolerances      MEDICATIONS  (STANDING):    MEDICATIONS  (PRN):      Social History: no tobacco, no etoh     Family history: Pt denies any sign FHx    ROS:   ENT: all negative except as noted in HPI   CV: denies palpitations  Pulm: denies SOB, cough, hemoptysis  GI: denies change in apetite, indigestion, n/v  : denies pertinent urinary symptoms, urgency  Neuro: denies numbness/tingling, loss of sensation  Psych: denies anxiety  MS: denies muscle weakness, instability  Heme: denies easy bruising or bleeding  Endo: denies heat/cold intolerance, excessive sweating  Vascular: denies LE edema    Vital Signs Last 24 Hrs  T(C): 37.3 (19 Jan 2019 09:21), Max: 37.3 (19 Jan 2019 09:21)  T(F): 99.1 (19 Jan 2019 09:21), Max: 99.1 (19 Jan 2019 09:21)  HR: 96 (19 Jan 2019 09:21) (96 - 99)  BP: 148/91 (19 Jan 2019 09:21) (142/89 - 148/91)  BP(mean): --  RR: 19 (19 Jan 2019 09:21) (19 - 20)  SpO2: 99% (19 Jan 2019 09:21) (96% - 99%)                          14.3   9.4   )-----------( 258      ( 19 Jan 2019 10:10 )             44.2    01-19    142  |  104  |  17  ----------------------------<  117<H>  4.4   |  27  |  1.39<H>    Ca    9.2      19 Jan 2019 10:10    TPro  6.6  /  Alb  3.8  /  TBili  0.2  /  DBili  x   /  AST  17  /  ALT  33  /  AlkPhos  141<H>  01-19   PT/INR - ( 19 Jan 2019 10:10 )   PT: 10.9 sec;   INR: 0.96 ratio         PTT - ( 19 Jan 2019 10:10 )  PTT:28.6 sec    PHYSICAL EXAM:  Gen: NAD  Skin: No rashes, bruises, or lesions  Head: Normocephalic, Atraumatic  Face: no edema, erythema, or fluctuance. Parotid glands soft without mass  Eyes: no scleral injection  Nose: Nares bilaterally patent, no discharge  Mouth: No Stridor / Drooling / Trismus.  Mucosa moist, tongue/uvula midline, oropharynx clear, however large tongue  Neck: Flat, supple, no lymphadenopathy, trachea midline, no masses  Lymphatic: No lymphadenopathy  Resp: breathing easily, no stridor  CV: no peripheral edema/cyanosis  GI: nondistended   Peripheral vascular: no JVD or edema  Neuro: facial nerve intact, no facial droop      Fiberoptic Indirect laryngoscopy:  (Scope #2 used) Reason for Laryngoscopy: apthous ulcer on posterior pharynx     Patient was unable to cooperate with mirror.  Nasopharynx, oropharynx, and hypopharynx clear, no bleeding. Tongue base, posterior pharyngeal wall, vallecula, epiglottis, and subglottis appear normal. No erythema, edema, pooling of secretions, masses or lesions. Airway patent, no foreign body visualized. No glottic/supraglottic edema. True vocal cords, arytenoids, vestibular folds, ventricles, pyriform sinuses, and aryepiglottic folds appear normal bilaterally. Vocal cords mobile with good contact b/l.              IMAGING/ADDITIONAL STUDIES: < from: CT Neck Soft Tissue w/ IV Cont (01.19.19 @ 11:13) >  IMPRESSION:    No abscesses.    < end of copied text >

## 2019-01-19 NOTE — ED PROVIDER NOTE - PROGRESS NOTE DETAILS
Dr. Castellanos discussed case and plan with Dr. Hurt patient's pulmonologist. Discussed concern for patient's BG given steroids x3 weeks at this time, as well as concern for significant sharp sore throat for 3 days. Will discuss after work-up as needed. -Yuliana Sears PA-C spoke with dr Singh ask for ENT  consult ,ENT pa called case discussed ,will see pt in ER

## 2019-01-20 DIAGNOSIS — E10.65 TYPE 1 DIABETES MELLITUS WITH HYPERGLYCEMIA: ICD-10-CM

## 2019-01-20 LAB
ANION GAP SERPL CALC-SCNC: 12 MMOL/L — SIGNIFICANT CHANGE UP (ref 5–17)
BUN SERPL-MCNC: 19 MG/DL — SIGNIFICANT CHANGE UP (ref 7–23)
CALCIUM SERPL-MCNC: 8.9 MG/DL — SIGNIFICANT CHANGE UP (ref 8.4–10.5)
CHLORIDE SERPL-SCNC: 102 MMOL/L — SIGNIFICANT CHANGE UP (ref 96–108)
CO2 SERPL-SCNC: 24 MMOL/L — SIGNIFICANT CHANGE UP (ref 22–31)
CREAT SERPL-MCNC: 1.26 MG/DL — SIGNIFICANT CHANGE UP (ref 0.5–1.3)
GLUCOSE BLDC GLUCOMTR-MCNC: 154 MG/DL — HIGH (ref 70–99)
GLUCOSE BLDC GLUCOMTR-MCNC: 205 MG/DL — HIGH (ref 70–99)
GLUCOSE BLDC GLUCOMTR-MCNC: 216 MG/DL — HIGH (ref 70–99)
GLUCOSE BLDC GLUCOMTR-MCNC: 223 MG/DL — HIGH (ref 70–99)
GLUCOSE SERPL-MCNC: 224 MG/DL — HIGH (ref 70–99)
HBA1C BLD-MCNC: 7.8 % — HIGH (ref 4–5.6)
HBA1C BLD-MCNC: 7.8 % — HIGH (ref 4–5.6)
HCT VFR BLD CALC: 43.2 % — SIGNIFICANT CHANGE UP (ref 39–50)
HGB BLD-MCNC: 13.6 G/DL — SIGNIFICANT CHANGE UP (ref 13–17)
MCHC RBC-ENTMCNC: 26 PG — LOW (ref 27–34)
MCHC RBC-ENTMCNC: 31.5 GM/DL — LOW (ref 32–36)
MCV RBC AUTO: 82.6 FL — SIGNIFICANT CHANGE UP (ref 80–100)
PLATELET # BLD AUTO: 231 K/UL — SIGNIFICANT CHANGE UP (ref 150–400)
POTASSIUM SERPL-MCNC: 4.4 MMOL/L — SIGNIFICANT CHANGE UP (ref 3.5–5.3)
POTASSIUM SERPL-SCNC: 4.4 MMOL/L — SIGNIFICANT CHANGE UP (ref 3.5–5.3)
RBC # BLD: 5.23 M/UL — SIGNIFICANT CHANGE UP (ref 4.2–5.8)
RBC # FLD: 15.9 % — HIGH (ref 10.3–14.5)
SODIUM SERPL-SCNC: 138 MMOL/L — SIGNIFICANT CHANGE UP (ref 135–145)
WBC # BLD: 6.85 K/UL — SIGNIFICANT CHANGE UP (ref 3.8–10.5)
WBC # FLD AUTO: 6.85 K/UL — SIGNIFICANT CHANGE UP (ref 3.8–10.5)

## 2019-01-20 PROCEDURE — 93010 ELECTROCARDIOGRAM REPORT: CPT

## 2019-01-20 RX ORDER — INSULIN LISPRO 100/ML
1 VIAL (ML) SUBCUTANEOUS
Qty: 0 | Refills: 0 | Status: DISCONTINUED | OUTPATIENT
Start: 2019-01-20 | End: 2019-01-21

## 2019-01-20 RX ORDER — ACYCLOVIR SODIUM 500 MG
400 VIAL (EA) INTRAVENOUS THREE TIMES A DAY
Qty: 0 | Refills: 0 | Status: DISCONTINUED | OUTPATIENT
Start: 2019-01-20 | End: 2019-01-21

## 2019-01-20 RX ADMIN — BUDESONIDE AND FORMOTEROL FUMARATE DIHYDRATE 2 PUFF(S): 160; 4.5 AEROSOL RESPIRATORY (INHALATION) at 22:08

## 2019-01-20 RX ADMIN — LIDOCAINE 10 MILLILITER(S): 4 CREAM TOPICAL at 05:20

## 2019-01-20 RX ADMIN — Medication 400 MILLIGRAM(S): at 22:07

## 2019-01-20 RX ADMIN — LOSARTAN POTASSIUM 100 MILLIGRAM(S): 100 TABLET, FILM COATED ORAL at 05:20

## 2019-01-20 RX ADMIN — BUDESONIDE AND FORMOTEROL FUMARATE DIHYDRATE 2 PUFF(S): 160; 4.5 AEROSOL RESPIRATORY (INHALATION) at 05:20

## 2019-01-20 RX ADMIN — Medication 20 MILLIGRAM(S): at 12:19

## 2019-01-20 RX ADMIN — PANTOPRAZOLE SODIUM 40 MILLIGRAM(S): 20 TABLET, DELAYED RELEASE ORAL at 05:20

## 2019-01-20 RX ADMIN — LIDOCAINE 10 MILLILITER(S): 4 CREAM TOPICAL at 20:42

## 2019-01-20 NOTE — PROGRESS NOTE ADULT - SUBJECTIVE AND OBJECTIVE BOX
ENT ISSUE/POD: Sore throat    HPI: 57 yo male seen by ENT for sore throat, noted to have ulcerations to posterior pharynx on laryngoscopy, likely viral. Pt was started on lidocaine swish and spit. Pt tolerating regular diet, with improving pain. WBC and temp WNL        PAST MEDICAL & SURGICAL HISTORY:  GERD (gastroesophageal reflux disease)  HTN (hypertension)  DM (diabetes mellitus)  S/P appendectomy    Allergies    penicillins (Rash)    Intolerances      MEDICATIONS  (STANDING):  buDESOnide 160 MICROgram(s)/formoterol 4.5 MICROgram(s) Inhaler 2 Puff(s) Inhalation two times a day  dextrose 5%. 1000 milliLiter(s) (50 mL/Hr) IV Continuous <Continuous>  dextrose 50% Injectable 12.5 Gram(s) IV Push once  dextrose 50% Injectable 25 Gram(s) IV Push once  dextrose 50% Injectable 25 Gram(s) IV Push once  insulin lispro (HumaLOG) Pump 1 Each SubCutaneous Continuous Pump  losartan 100 milliGRAM(s) Oral daily  pantoprazole    Tablet 40 milliGRAM(s) Oral before breakfast  predniSONE   Tablet 20 milliGRAM(s) Oral daily    MEDICATIONS  (PRN):  dextrose 40% Gel 15 Gram(s) Oral once PRN Blood Glucose LESS THAN 70 milliGRAM(s)/deciliter  glucagon  Injectable 1 milliGRAM(s) IntraMuscular once PRN Glucose LESS THAN 70 milligrams/deciliter  lidocaine 2% Viscous 10 milliLiter(s) Swish and Spit every 6 hours PRN sore throat    ROS:   ENT: all negative except as noted in HPI   Pulm: denies SOB, cough, hemoptysis  Neuro: denies numbness/tingling, loss of sensation  Endo: denies heat/cold intolerance, excessive sweating      Vital Signs Last 24 Hrs  T(C): 37.3 (20 Jan 2019 04:06), Max: 37.4 (19 Jan 2019 18:14)  T(F): 99.1 (20 Jan 2019 04:06), Max: 99.3 (19 Jan 2019 18:14)  HR: 89 (20 Jan 2019 06:04) (89 - 100)  BP: 158/84 (20 Jan 2019 04:06) (116/68 - 158/84)  BP(mean): --  RR: 18 (20 Jan 2019 04:06) (18 - 20)  SpO2: 97% (20 Jan 2019 06:04) (95% - 99%)                          14.3   9.4   )-----------( 258      ( 19 Jan 2019 10:10 )             44.2    01-19    142  |  104  |  17  ----------------------------<  117<H>  4.4   |  27  |  1.39<H>    Ca    9.2      19 Jan 2019 10:10    TPro  6.6  /  Alb  3.8  /  TBili  0.2  /  DBili  x   /  AST  17  /  ALT  33  /  AlkPhos  141<H>  01-19   PT/INR - ( 19 Jan 2019 10:10 )   PT: 10.9 sec;   INR: 0.96 ratio         PTT - ( 19 Jan 2019 10:10 )  PTT:28.6 sec    PHYSICAL EXAM:  Gen: NAD  Skin: No rashes, bruises, or lesions  Head: Normocephalic, Atraumatic  Face: no edema, erythema, or fluctuance. Parotid glands soft without mass  Eyes: no scleral injection  Nose: Nares bilaterally patent, no discharge  Mouth: No Stridor / Drooling / Trismus.  Mucosa moist, tongue/uvula midline, oropharynx clear, non erythematous, no exudates, large tongue  Neck: Flat, supple, no lymphadenopathy, trachea midline, no masses  Lymphatic: No lymphadenopathy  Resp: breathing easily, no stridor  CV: no peripheral edema/cyanosis  GI: nondistended   Peripheral vascular: no JVD or edema  Neuro: facial nerve intact, no facial droop

## 2019-01-20 NOTE — CONSULT NOTE ADULT - SUBJECTIVE AND OBJECTIVE BOX
HPI:  58 yr old M w/a PMH of Insulin dependent diabetes mellitus on a insulin pump, HTN, cough variant asthma presents with throat pain.  [place] patient with diagnosed with RSV infection and subsequently had an exacerbation of asthma and since this time has been taking fluctuating doses of steroids depending upon symptoms.  He states three days ago he began experiencing throat pain making it difficult to swallow.  Was prescribed nystatin without significant relief (19 Jan 2019 14:31)      Admit Diagnosis  Acute pharyngitis      ENDOCRINE HPI: 59 y/o Type 1 DM insulin pump treatment admitted with throat pain and suspected EKG changes.    Pulmonary ENT, and Cardiology input noted.  Patient is a 59 y/o Type 1 DM recently on treatment with [employer] insulin pump used mostly in auto-mode with Guardian sensor, humalog insulin.   On this system diabetes control has improved with HbA1c in the 7.5% to 8%.  Patient has been on steroids for the past month due to asthma exacerbation after an RSV infection. Pre-meal bolus insulin was increased by 25% with good control. The past few days he C/O severe upper pharynx pain, no fever, no chills, no new discharge, persistens cough, and increasing SOB. His Glycemic control has been good. He was admited for this pain and suspected EKG changes.    PAST MEDICAL & SURGICAL HISTORY:  GERD (gastroesophageal reflux disease)  HTN (hypertension)  Type 1 DM (diabetes mellitus)  Asthma.  S/P appendectomy      FAMILY HISTORY:  Family history of coronary artery disease in mother (Mother)      Social History: non-smoker    Outpatient Medications:    MEDICATIONS  (STANDING):  buDESOnide 160 MICROgram(s)/formoterol 4.5 MICROgram(s) Inhaler 2 Puff(s) Inhalation two times a day  dextrose 5%. 1000 milliLiter(s) (50 mL/Hr) IV Continuous <Continuous>  dextrose 50% Injectable 12.5 Gram(s) IV Push once  dextrose 50% Injectable 25 Gram(s) IV Push once  dextrose 50% Injectable 25 Gram(s) IV Push once  insulin lispro (HumaLOG) Pump 1 Each SubCutaneous Continuous Pump  losartan 100 milliGRAM(s) Oral daily  pantoprazole    Tablet 40 milliGRAM(s) Oral before breakfast  predniSONE   Tablet 20 milliGRAM(s) Oral daily    MEDICATIONS  (PRN):  dextrose 40% Gel 15 Gram(s) Oral once PRN Blood Glucose LESS THAN 70 milliGRAM(s)/deciliter  glucagon  Injectable 1 milliGRAM(s) IntraMuscular once PRN Glucose LESS THAN 70 milligrams/deciliter  lidocaine 2% Viscous 10 milliLiter(s) Swish and Spit every 6 hours PRN sore throat      Allergies    penicillins (Rash)    Intolerances        Review of Systems:  Constitutional: No fever, no chills  Eyes: No blurry vision  Neuro: No tremors  HEENT: pharyngal pain, no difficulty swallowing.  Cardiovascular: No chest pain, palpitations  Respiratory: SOB, cough, clear sputum.  GI: No nausea, vomiting, abdominal pain, increased appetite on steroids.  : No dysuria  Skin: no rash  Psych: no depression  Endocrine: no polyuria, polydipsia  Hem/lymph: no swelling  Osteoporosis: no fractures    ALL OTHER SYSTEMS REVIEWED AND NEGATIVE    UNABLE TO OBTAIN    PHYSICAL EXAM:  VITALS: T(C): 37 (01-20-19 @ 14:13)  T(F): 98.6 (01-20-19 @ 14:13), Max: 99.3 (01-19-19 @ 18:14)  HR: 72 (01-20-19 @ 14:13) (72 - 100)  BP: 153/72 (01-20-19 @ 14:13) (116/68 - 158/84)  RR:  (18 - 19)  SpO2:  (95% - 99%)  Wt(lbs): --259  GENERAL: NAD, well-groomed, well-developed  EYES: No proptosis, no lid lag, anicteric  HEENT:  Atraumatic, Normocephalic, moist mucous membranes  THYROID: Normal size, no palpable nodules  RESPIRATORY: Clear to auscultation bilaterally; mild wheezing  CARDIOVASCULAR: Regular rate and rhythm; No murmurs; no peripheral edema  GI: Soft, nontender, non distended, normal bowel sounds  SKIN: Dry, intact, No rashes or lesions  MUSCULOSKELETAL: Full range of motion, normal strength  NEURO: sensation intact, extraocular movements intact, no tremor  PSYCH: Alert and oriented x 3, normal affect, normal mood    POCT Blood Glucose.: 223 mg/dL (01-20-19 @ 12:17)  POCT Blood Glucose.: 154 mg/dL (01-20-19 @ 07:54)  POCT Blood Glucose.: 110 mg/dL (01-19-19 @ 17:27)                            13.6   6.85  )-----------( 231      ( 20 Jan 2019 08:14 )             43.2       01-20    138  |  102  |  19  ----------------------------<  224<H>  4.4   |  24  |  1.26    Ca    8.9      20 Jan 2019 07:06    TPro  6.6  /  Alb  3.8  /  TBili  0.2  /  DBili  x   /  AST  17  /  ALT  33  /  AlkPhos  141<H>  01-19      Thyroid Function Tests:      Hemoglobin A1C, Whole Blood: 7.8 % <H> [4.0 - 5.6] (01-20-19 @ 08:14)  Hemoglobin A1C, Whole Blood: 7.8 % <H> [4.0 - 5.6] (01-20-19 @ 08:11)          Radiology:

## 2019-01-20 NOTE — CONSULT NOTE ADULT - SUBJECTIVE AND OBJECTIVE BOX
[facility] Cardiology Consult  _________________________    Patient is a 58y old  Male who presents with a chief complaint of sore throat (20 Jan 2019 10:21)      HPI:  58 yr old M w/a PMH of Insulin dependent diabetes mellitus on a insulin pump, HTN, cough variant asthma presents with throat pain.  [place] patient with diagnosed with RSV infection and subsequently had an exacerbation of asthma and since this time has been taking fluctuating doses of steroids depending upon symptoms.  He states three days ago he began experiencing throat pain making it difficult to swallow.  Was prescribed nystatin without significant relief.    He is being followed by ENT and being managed for pharyngitis.  ECG showed t wave inversions. Patient had cardiac cath 11/2017 at [facility] which showed mild luminal irregularities.   No chest pain. No shortness of breath. No leg swellings.   No exertional component to throat discomfort.       PAST MEDICAL & SURGICAL HISTORY:  GERD (gastroesophageal reflux disease)  HTN (hypertension)  DM (diabetes mellitus)  S/P appendectomy      MEDICATIONS  (STANDING):  buDESOnide 160 MICROgram(s)/formoterol 4.5 MICROgram(s) Inhaler 2 Puff(s) Inhalation two times a day  dextrose 5%. 1000 milliLiter(s) (50 mL/Hr) IV Continuous <Continuous>  dextrose 50% Injectable 12.5 Gram(s) IV Push once  dextrose 50% Injectable 25 Gram(s) IV Push once  dextrose 50% Injectable 25 Gram(s) IV Push once  insulin lispro (HumaLOG) Pump 1 Each SubCutaneous Continuous Pump  losartan 100 milliGRAM(s) Oral daily  pantoprazole    Tablet 40 milliGRAM(s) Oral before breakfast  predniSONE   Tablet 20 milliGRAM(s) Oral daily    MEDICATIONS  (PRN):  dextrose 40% Gel 15 Gram(s) Oral once PRN Blood Glucose LESS THAN 70 milliGRAM(s)/deciliter  glucagon  Injectable 1 milliGRAM(s) IntraMuscular once PRN Glucose LESS THAN 70 milligrams/deciliter  lidocaine 2% Viscous 10 milliLiter(s) Swish and Spit every 6 hours PRN sore throat      Allergies    penicillins (Rash)    Intolerances        Social Histroy: Tobacco- , ETOH-, Illicit Drugs-    T(C): 37.3 (01-20-19 @ 04:06), Max: 37.4 (01-19-19 @ 18:14)  HR: 88 (01-20-19 @ 08:55) (88 - 100)  BP: 158/84 (01-20-19 @ 04:06) (116/68 - 158/84)  RR: 18 (01-20-19 @ 04:06) (18 - 20)  SpO2: 97% (01-20-19 @ 08:55) (95% - 99%)  I&O's Summary    19 Jan 2019 07:01  -  20 Jan 2019 07:00  --------------------------------------------------------  IN: 300 mL / OUT: 0 mL / NET: 300 mL    20 Jan 2019 07:01  -  20 Jan 2019 10:47  --------------------------------------------------------  IN: 420 mL / OUT: 0 mL / NET: 420 mL        Review of Systems:  Constitutional: [ ] Fever [ ] Chills [ ] Fatigue [ ] Weight change   HEENT: [ ] Blurred vision [ ] Eye Pain [ ] Headache [ ] Runny nose [x] Sore Throat   Respiratory: [ ] Cough [ ] Wheezing [ ] Shortness of breath  Cardiovascular: [ ] Chest Pain [ ] Palpitations [ ] CARL [ ] PND [ ] Orthopnea  Gastrointestinal: [ ] Abdominal Pain [ ] Diarrhea [ ] Constipation [ ] Hemorrhoids [ ] Nausea [ ] Vomiting  Genitourinary: [ ] Nocturia [ ] Dysuria [ ] Incontinence  Extremities: [ ] Swelling [ ] Joint Pain  Neurologic: [ ] Focal deficit [ ] Paresthesias [ ] Syncope  Lymphatic: [ ] Swelling [ ] Lymphadenopathy   Skin: [ ] Rash [ ] Ecchymoses [ ] Wounds [ ] Lesions  Psychiatry: [ ] Depression [ ] Suicidal/Homicidal Ideation [ ] Anxiety [ ] Sleep Disturbances  [x] 10 point review of systems is otherwise negative except as mentioned above            [ ]Unable to obtain    PHYSICAL EXAM:  GENERAL: Alert, NAD  NECK: Supple  CHEST/LUNG: Clear to auscultation bilaterally; No wheezes, rales, or rhonchi  HEART: S1 S2 normal, RRR,  No murmurs, rubs, or gallops  ABDOMEN: Soft, Nondistended  EXTREMITIES:  No LE edema.      LABS:                        13.6   6.85  )-----------( 231      ( 20 Jan 2019 08:14 )             43.2     01-20    138  |  102  |  19  ----------------------------<  224<H>  4.4   |  24  |  1.26    Ca    8.9      20 Jan 2019 07:06    TPro  6.6  /  Alb  3.8  /  TBili  0.2  /  DBili  x   /  AST  17  /  ALT  33  /  AlkPhos  141<H>  01-19    PT/INR - ( 19 Jan 2019 10:10 )   PT: 10.9 sec;   INR: 0.96 ratio         PTT - ( 19 Jan 2019 10:10 )  PTT:28.6 sec  CARDIAC MARKERS ( 19 Jan 2019 10:10 )  x     / x     / 372 U/L / x     / 3.6 ng/mL              MEDICATIONS  (STANDING):  buDESOnide 160 MICROgram(s)/formoterol 4.5 MICROgram(s) Inhaler 2 Puff(s) Inhalation two times a day  dextrose 5%. 1000 milliLiter(s) (50 mL/Hr) IV Continuous <Continuous>  dextrose 50% Injectable 12.5 Gram(s) IV Push once  dextrose 50% Injectable 25 Gram(s) IV Push once  dextrose 50% Injectable 25 Gram(s) IV Push once  insulin lispro (HumaLOG) Pump 1 Each SubCutaneous Continuous Pump  losartan 100 milliGRAM(s) Oral daily  pantoprazole    Tablet 40 milliGRAM(s) Oral before breakfast  predniSONE   Tablet 20 milliGRAM(s) Oral daily    MEDICATIONS  (PRN):  dextrose 40% Gel 15 Gram(s) Oral once PRN Blood Glucose LESS THAN 70 milliGRAM(s)/deciliter  glucagon  Injectable 1 milliGRAM(s) IntraMuscular once PRN Glucose LESS THAN 70 milligrams/deciliter  lidocaine 2% Viscous 10 milliLiter(s) Swish and Spit every 6 hours PRN sore throat      Troponin T, High Sensitivity Result: 25 ng/L (01-19-19 @ 12:09)      RADIOLOGY & ADDITIONAL TESTS:    Cardiology testing:  EKG: sinus with t wave inversions in anterolateral leads, borderline LVH.     Telemetry: sinus 80-110s

## 2019-01-20 NOTE — PROGRESS NOTE ADULT - ASSESSMENT
57 yo male seen by ENT for throat pain. Pt with improving pain on viscous lidocaine swish and spit since noticing ulcers of the posterior pharynx on laryngoscopy 1/19. Pt tolerating regular diet. Afebrile, normal WBC

## 2019-01-20 NOTE — PROGRESS NOTE ADULT - ASSESSMENT
Assessment:    recent asthmatic bronchitis, viral induced  CHERELLE on CPAP  Odynophagia- ENT eval noted. topical therapy         Plan/Recommendations:    continue symbicort. Hold off on nebulizers as may be irritating throat  prednisone 20mg/day with taper  DVT and GI prophylaxis   viscous lidocaine as per ENT. antiviral therapy if indicated per their evaluation  continue CPAP per Dr. Fonseca's eval.    will follow    Kwabena Watkins MD  Pulmonary Medicine  [phone]

## 2019-01-20 NOTE — PROGRESS NOTE ADULT - SUBJECTIVE AND OBJECTIVE BOX
Follow-up Pulm Progress Note    The patient was seen and examined. Notes reviewed and discussed with staff/team as applicable      No new respiratory events overnight. ENT notes reviewed.    Denies: increased SOB, Chest pain, increased cough, colored phlegm, hemoptysis, N/V/D, neck stiffness, dysuria  ROS sore throat    Vital Signs Last 24 Hrs  T(C): 37.3 (20 Jan 2019 04:06), Max: 37.4 (19 Jan 2019 18:14)  T(F): 99.1 (20 Jan 2019 04:06), Max: 99.3 (19 Jan 2019 18:14)  HR: 88 (20 Jan 2019 08:55) (88 - 100)  BP: 158/84 (20 Jan 2019 04:06) (116/68 - 158/84)  BP(mean): --  RR: 18 (20 Jan 2019 04:06) (18 - 20)  SpO2: 97% (20 Jan 2019 08:55) (95% - 99%)    01-19 @ 07:01  -  01-20 @ 07:00  --------------------------------------------------------  IN: 300 mL / OUT: 0 mL / NET: 300 mL          Medications:  MEDICATIONS  (STANDING):  buDESOnide 160 MICROgram(s)/formoterol 4.5 MICROgram(s) Inhaler 2 Puff(s) Inhalation two times a day  dextrose 5%. 1000 milliLiter(s) (50 mL/Hr) IV Continuous <Continuous>  dextrose 50% Injectable 12.5 Gram(s) IV Push once  dextrose 50% Injectable 25 Gram(s) IV Push once  dextrose 50% Injectable 25 Gram(s) IV Push once  insulin lispro (HumaLOG) Pump 1 Each SubCutaneous Continuous Pump  losartan 100 milliGRAM(s) Oral daily  pantoprazole    Tablet 40 milliGRAM(s) Oral before breakfast  predniSONE   Tablet 20 milliGRAM(s) Oral daily    MEDICATIONS  (PRN):  dextrose 40% Gel 15 Gram(s) Oral once PRN Blood Glucose LESS THAN 70 milliGRAM(s)/deciliter  glucagon  Injectable 1 milliGRAM(s) IntraMuscular once PRN Glucose LESS THAN 70 milligrams/deciliter  lidocaine 2% Viscous 10 milliLiter(s) Swish and Spit every 6 hours PRN sore throat      Allergies    penicillins (Rash)    Intolerances        Vent settings (if applicable)        Physical Examination:    Pleasant black man, NAD  Neck: no JVD, LAD, accessory muscle use  PULM: Coarse BS bilaterally  CVS: Regular rate and rhythm, S1S2, no murmurs, rubs, or gallops  Abdomen: obese, soft, NT  Extremities: no edema  Neuro: non focal      LABS:                        13.6   6.85  )-----------( 231      ( 20 Jan 2019 08:14 )             43.2     01-20    138  |  102  |  19  ----------------------------<  224<H>  4.4   |  24  |  1.26    Ca    8.9      20 Jan 2019 07:06    TPro  6.6  /  Alb  3.8  /  TBili  0.2  /  DBili  x   /  AST  17  /  ALT  33  /  AlkPhos  141<H>  01-19      CARDIAC MARKERS ( 19 Jan 2019 10:10 )  x     / x     / 372 U/L / x     / 3.6 ng/mL      CAPILLARY BLOOD GLUCOSE      POCT Blood Glucose.: 154 mg/dL (20 Jan 2019 07:54)    PT/INR - ( 19 Jan 2019 10:10 )   PT: 10.9 sec;   INR: 0.96 ratio         PTT - ( 19 Jan 2019 10:10 )  PTT:28.6 sec      RADIOLOGY REVIEWED    CXR:    < from: Xray Chest 2 Views PA/Lat (01.19.19 @ 10:06) >  EXAM:  XR CHEST PA LAT 2V                            PROCEDURE DATE:  01/19/2019            INTERPRETATION:  HISTORY: Cough. Sore throat.    TECHNIQUE: PA and lateral views of the chest were obtained.    COMPARISON: 12/26/2018    FINDINGS:  The cardiac silhouette is normal in size. There are no focal   consolidations or pleural effusions. The hilar and mediastinal structures   appear unremarkable. The osseous structures are intact.    IMPRESSION: Normal chest radiograph. Clear lungs.          BRIJESH UMAÑA M.D., ATTENDING RADIOLOGIST  This document has been electronically signed. Jan 19 2019 10:37AM

## 2019-01-20 NOTE — CONSULT NOTE ADULT - ASSESSMENT
59 y/o Type 1 DM insulin pump treatment admitted with throat pain and suspected EKG changes.    Patient is a 59 y/o Type 1 DM recently on treatment with [employer] insulin pump used mostly in auto-mode with Guardian sensor, humalog insulin.   On this system diabetes control has improved with HbA1c in the 7.5% to 8%.  Patient has been on steroids for the past month due to asthma exacerbation after an RSV infection. Pre-meal bolus insulin was increased by 25% with good control. The past few days he C/O severe upper pharynx pain, no fever, no chills, no new discharge, persistens cough, and increasing SOB. His Glycemic control has been good. He was admited for this pain and suspected EKG changes.    Pulmonary ENT, and Cardiology input noted.    Patients insulin requirements have been stable, expect an increase in the meal coverage due to steroids use.    Suggest:  Patient may use insulin pump / sensor in auto-mode.  Basal rated on manual mode- 00:00- 1.5 u/h, 0700-1.1u/h, 14:00- 1.5 u/h.  Bolus- Carb coverage increase by 25% due to steroids 1/4.5, ISF 1/24.  Targets 100-140 mg/dL.  Will follow.

## 2019-01-20 NOTE — CONSULT NOTE ADULT - ASSESSMENT
58 yr old M w/a PMH of Insulin dependent diabetes mellitus on a insulin pump, HTN, cough variant asthma presents with throat pain.

## 2019-01-20 NOTE — PROVIDER CONTACT NOTE (OTHER) - ACTION/TREATMENT ORDERED:
Provider to get in touch with endocrinologist to place orders for insulin pump use. all forms signed in chart as per patient. Will continue to monitor.

## 2019-01-20 NOTE — CONSULT NOTE ADULT - PROBLEM SELECTOR RECOMMENDATION 9
-  -Likely baseline, LVH related t wave inversions.   -ECG from [facility] 1/2018 unchanged.   -Hs troponins negative x2  -No chest pains  -Cath 11/2017 showed mild luminal irregularities.   -No need for further inpatient cardiac workup.     Can follow up with his cardiologist, Dr. Park ([facility] cardiology). Will sign off. please reconsult as needed.     Joseph Sethi D.O.  [phone]

## 2019-01-20 NOTE — PROGRESS NOTE ADULT - SUBJECTIVE AND OBJECTIVE BOX
Patient is a 58y old  Male who presents with a chief complaint of sore throat (20 Jan 2019 14:27)      INTERVAL HPI/OVERNIGHT EVENTS: c/o odynophagia, better w lidocaine rinsing      Vital Signs Last 24 Hrs  T(C): 37 (20 Jan 2019 14:13), Max: 37.4 (19 Jan 2019 18:14)  T(F): 98.6 (20 Jan 2019 14:13), Max: 99.3 (19 Jan 2019 18:14)  HR: 72 (20 Jan 2019 14:13) (72 - 100)  BP: 153/72 (20 Jan 2019 14:13) (116/68 - 158/84)  BP(mean): --  RR: 18 (20 Jan 2019 14:13) (18 - 19)  SpO2: 96% (20 Jan 2019 14:13) (95% - 99%)    buDESOnide 160 MICROgram(s)/formoterol 4.5 MICROgram(s) Inhaler 2 Puff(s) Inhalation two times a day  dextrose 40% Gel 15 Gram(s) Oral once PRN  dextrose 5%. 1000 milliLiter(s) IV Continuous <Continuous>  dextrose 50% Injectable 12.5 Gram(s) IV Push once  dextrose 50% Injectable 25 Gram(s) IV Push once  dextrose 50% Injectable 25 Gram(s) IV Push once  glucagon  Injectable 1 milliGRAM(s) IntraMuscular once PRN  insulin lispro (HumaLOG) Pump 1 Each SubCutaneous Continuous Pump  lidocaine 2% Viscous 10 milliLiter(s) Swish and Spit every 6 hours PRN  losartan 100 milliGRAM(s) Oral daily  pantoprazole    Tablet 40 milliGRAM(s) Oral before breakfast  predniSONE   Tablet 20 milliGRAM(s) Oral daily      PHYSICAL EXAM:  GENERAL: NAD,   EYES: conjunctiva and sclera clear  ENMT: Moist mucous membranes  NECK: Supple, No JVD, Normal thyroid  NERVOUS SYSTEM:  Alert & Oriented X3,   CHEST/LUNG: Clear to auscultation bilaterally; No rales, rhonchi, wheezing, or rubs  HEART: Regular rate and rhythm; No murmurs, rubs, or gallops  ABDOMEN: Soft, Nontender, Nondistended; Bowel sounds present  EXTREMITIES:  2+ Peripheral Pulses, No clubbing, cyanosis, or edema  LYMPH: No lymphadenopathy noted  SKIN: No rashes or lesions    Consultant(s) Notes Reviewed:  [x ] YES  [ ] NO  Care Discussed with Consultants/Other Providers [ x] YES  [ ] NO    LABS:                        13.6   6.85  )-----------( 231      ( 20 Jan 2019 08:14 )             43.2     01-20    138  |  102  |  19  ----------------------------<  224<H>  4.4   |  24  |  1.26    Ca    8.9      20 Jan 2019 07:06    TPro  6.6  /  Alb  3.8  /  TBili  0.2  /  DBili  x   /  AST  17  /  ALT  33  /  AlkPhos  141<H>  01-19    PT/INR - ( 19 Jan 2019 10:10 )   PT: 10.9 sec;   INR: 0.96 ratio         PTT - ( 19 Jan 2019 10:10 )  PTT:28.6 sec    CAPILLARY BLOOD GLUCOSE      POCT Blood Glucose.: 223 mg/dL (20 Jan 2019 12:17)  POCT Blood Glucose.: 154 mg/dL (20 Jan 2019 07:54)  POCT Blood Glucose.: 110 mg/dL (19 Jan 2019 17:27)            RADIOLOGY & ADDITIONAL TESTS:    Imaging Personally Reviewed:  [ ] YES  [ ] NO

## 2019-01-20 NOTE — PROGRESS NOTE ADULT - PROBLEM SELECTOR PLAN 1
No exudates seen.  Patient with trial of nystatin without relief   CT of neck negative.    low grade fevers,  no WBC count.    As per ENT evaluation, presence of apthous ulcers.  r/o viral ulcers- check cmv pcr, may need hsv1/2 pcr from [facility]  Supportive care/continue lidocaine PRN  id cs

## 2019-01-20 NOTE — PROVIDER CONTACT NOTE (OTHER) - ASSESSMENT
pt AO4, manages insulin pump at home, pt able to explain how to operate meter and administer insulin as needed

## 2019-01-20 NOTE — PROGRESS NOTE ADULT - PROBLEM SELECTOR PLAN 6
new EKG changes as patient without any chest pain and troponin negative x 2  cards cs appreciated  outpt cards fu

## 2019-01-20 NOTE — PROGRESS NOTE ADULT - PROBLEM SELECTOR PLAN 1
Continue with supportive care  Continue with viscous lidocaine rinse  Consider PPI  Consider Antivrial if sxs worsen

## 2019-01-21 ENCOUNTER — TRANSCRIPTION ENCOUNTER (OUTPATIENT)
Age: 59
End: 2019-01-21

## 2019-01-21 VITALS
TEMPERATURE: 99 F | RESPIRATION RATE: 18 BRPM | SYSTOLIC BLOOD PRESSURE: 158 MMHG | DIASTOLIC BLOOD PRESSURE: 76 MMHG | OXYGEN SATURATION: 97 % | HEART RATE: 97 BPM

## 2019-01-21 LAB
CMV DNA CSF QL NAA+PROBE: SIGNIFICANT CHANGE UP
CMV DNA SPEC NAA+PROBE-LOG#: SIGNIFICANT CHANGE UP LOGIU/ML
CULTURE RESULTS: SIGNIFICANT CHANGE UP
GLUCOSE BLDC GLUCOMTR-MCNC: 160 MG/DL — HIGH (ref 70–99)
GLUCOSE BLDC GLUCOMTR-MCNC: 165 MG/DL — HIGH (ref 70–99)
SPECIMEN SOURCE: SIGNIFICANT CHANGE UP

## 2019-01-21 PROCEDURE — 85027 COMPLETE CBC AUTOMATED: CPT

## 2019-01-21 PROCEDURE — 87081 CULTURE SCREEN ONLY: CPT

## 2019-01-21 PROCEDURE — 82330 ASSAY OF CALCIUM: CPT

## 2019-01-21 PROCEDURE — 70491 CT SOFT TISSUE NECK W/DYE: CPT

## 2019-01-21 PROCEDURE — 82947 ASSAY GLUCOSE BLOOD QUANT: CPT

## 2019-01-21 PROCEDURE — 87633 RESP VIRUS 12-25 TARGETS: CPT

## 2019-01-21 PROCEDURE — 82553 CREATINE MB FRACTION: CPT

## 2019-01-21 PROCEDURE — 85730 THROMBOPLASTIN TIME PARTIAL: CPT

## 2019-01-21 PROCEDURE — 80048 BASIC METABOLIC PNL TOTAL CA: CPT

## 2019-01-21 PROCEDURE — 99285 EMERGENCY DEPT VISIT HI MDM: CPT | Mod: 25

## 2019-01-21 PROCEDURE — 82962 GLUCOSE BLOOD TEST: CPT

## 2019-01-21 PROCEDURE — 85610 PROTHROMBIN TIME: CPT

## 2019-01-21 PROCEDURE — 84484 ASSAY OF TROPONIN QUANT: CPT

## 2019-01-21 PROCEDURE — 83605 ASSAY OF LACTIC ACID: CPT

## 2019-01-21 PROCEDURE — 93005 ELECTROCARDIOGRAM TRACING: CPT

## 2019-01-21 PROCEDURE — 87486 CHLMYD PNEUM DNA AMP PROBE: CPT

## 2019-01-21 PROCEDURE — 83036 HEMOGLOBIN GLYCOSYLATED A1C: CPT

## 2019-01-21 PROCEDURE — 84132 ASSAY OF SERUM POTASSIUM: CPT

## 2019-01-21 PROCEDURE — 87798 DETECT AGENT NOS DNA AMP: CPT

## 2019-01-21 PROCEDURE — 82435 ASSAY OF BLOOD CHLORIDE: CPT

## 2019-01-21 PROCEDURE — 94640 AIRWAY INHALATION TREATMENT: CPT

## 2019-01-21 PROCEDURE — 82550 ASSAY OF CK (CPK): CPT

## 2019-01-21 PROCEDURE — 82010 KETONE BODYS QUAN: CPT

## 2019-01-21 PROCEDURE — 87581 M.PNEUMON DNA AMP PROBE: CPT

## 2019-01-21 PROCEDURE — 94660 CPAP INITIATION&MGMT: CPT

## 2019-01-21 PROCEDURE — 96374 THER/PROPH/DIAG INJ IV PUSH: CPT | Mod: XU

## 2019-01-21 PROCEDURE — 96361 HYDRATE IV INFUSION ADD-ON: CPT

## 2019-01-21 PROCEDURE — 71046 X-RAY EXAM CHEST 2 VIEWS: CPT

## 2019-01-21 PROCEDURE — 85014 HEMATOCRIT: CPT

## 2019-01-21 PROCEDURE — 82803 BLOOD GASES ANY COMBINATION: CPT

## 2019-01-21 PROCEDURE — 84295 ASSAY OF SERUM SODIUM: CPT

## 2019-01-21 PROCEDURE — 80053 COMPREHEN METABOLIC PANEL: CPT

## 2019-01-21 RX ORDER — LIDOCAINE 4 G/100G
10 CREAM TOPICAL
Qty: 600 | Refills: 0
Start: 2019-01-21 | End: 2019-01-30

## 2019-01-21 RX ORDER — ACYCLOVIR SODIUM 500 MG
10 VIAL (EA) INTRAVENOUS
Qty: 210 | Refills: 0
Start: 2019-01-21 | End: 2019-01-27

## 2019-01-21 RX ADMIN — Medication 20 MILLIGRAM(S): at 06:23

## 2019-01-21 RX ADMIN — BUDESONIDE AND FORMOTEROL FUMARATE DIHYDRATE 2 PUFF(S): 160; 4.5 AEROSOL RESPIRATORY (INHALATION) at 06:24

## 2019-01-21 RX ADMIN — PANTOPRAZOLE SODIUM 40 MILLIGRAM(S): 20 TABLET, DELAYED RELEASE ORAL at 06:23

## 2019-01-21 RX ADMIN — LOSARTAN POTASSIUM 100 MILLIGRAM(S): 100 TABLET, FILM COATED ORAL at 06:23

## 2019-01-21 RX ADMIN — LIDOCAINE 10 MILLILITER(S): 4 CREAM TOPICAL at 13:06

## 2019-01-21 RX ADMIN — Medication 400 MILLIGRAM(S): at 06:23

## 2019-01-21 RX ADMIN — LIDOCAINE 10 MILLILITER(S): 4 CREAM TOPICAL at 06:47

## 2019-01-21 NOTE — DISCHARGE NOTE ADULT - CARE PLAN
Principal Discharge DX:	Throat ulcer  Goal:	resolved  Assessment and plan of treatment:	Continue antibiotic course as prescribed  Pain relief oral rinse  Follow up with your physician  If you experience any "choking" symptoms or problems breathing - seek medical attention immediately  Secondary Diagnosis:	Controlled type 1 diabetes mellitus with hyperglycemia  Goal:	disease management  Assessment and plan of treatment:	Follow your current regimen  Follow up with your endocrinologist

## 2019-01-21 NOTE — DISCHARGE NOTE ADULT - PLAN OF CARE
resolved Continue antibiotic course as prescribed  Pain relief oral rinse  Follow up with your physician  If you experience any "choking" symptoms or problems breathing - seek medical attention immediately disease management Follow your current regimen  Follow up with your endocrinologist

## 2019-01-21 NOTE — PROGRESS NOTE ADULT - ASSESSMENT
Assessment:    recent asthmatic bronchitis, viral induced  CHERELLE on CPAP  Odynophagia- ENT eval noted. topical therapy         Plan/Recommendations:    continue symbicort. Hold off on nebulizers as may be irritating throat  prednisone 20mg/day with taper- would taper by 10 mg q 3 days  DVT and GI prophylaxis   viscous lidocaine as per ENT. antiviral therapy if indicated per their evaluation  continue CPAP per Dr. Fonseca's eval.  dc planning ok from pulmonary standpoint- will follow up with Dr Marina Heller MD  Pulmonary Medicine  [phone]

## 2019-01-21 NOTE — PROGRESS NOTE ADULT - ASSESSMENT
59 yo male seen by ENT for throat pain. Pt with temporary improving pain on viscous lidocaine swish and spit since noticing ulcers of the posterior pharynx on laryngoscopy 1/19. Antiviral suspension added last night. Pt tolerating regular diet. Afebrile, normal WBC

## 2019-01-21 NOTE — PROGRESS NOTE ADULT - PROBLEM SELECTOR PLAN 1
No exudates seen.  Patient with trial of nystatin without relief   CT of neck negative.  rvpneg, strep throat neg  afebrile,,  no WBC count.    As per ENT evaluation, presence of apthous ulcers.  Supportive care/continue lidocaine PRN  acyclovir started for possible pharangeal herpetic ulcers

## 2019-01-21 NOTE — DISCHARGE NOTE ADULT - HOSPITAL COURSE
sore throat, noted to have ulcerations to posterior pharynx on laryngoscopy, likely viral. Pt was started on lidocaine swish and spit. Pt tolerating regular diet, with improvement of pain temporarily. Pt since started on antiviral suspension. Denies change in voice, difficulty breathing. WBC and temp WNL  Discharged on Acyclovir Suspension (7 day course) forpresumed mucocutaneous herpes simplex

## 2019-01-21 NOTE — PROGRESS NOTE ADULT - SUBJECTIVE AND OBJECTIVE BOX
Follow-up Pulm Progress Note    No new respiratory events overnight.  Denies increased SOB, chest pain, cough or mucus.  still with throat pain.  temporary relief with lidocaine rinses    Medications:  MEDICATIONS  (STANDING):  acyclovir    Suspension 400 milliGRAM(s) Oral three times a day  buDESOnide 160 MICROgram(s)/formoterol 4.5 MICROgram(s) Inhaler 2 Puff(s) Inhalation two times a day  dextrose 5%. 1000 milliLiter(s) (50 mL/Hr) IV Continuous <Continuous>  dextrose 50% Injectable 12.5 Gram(s) IV Push once  dextrose 50% Injectable 25 Gram(s) IV Push once  dextrose 50% Injectable 25 Gram(s) IV Push once  insulin lispro (HumaLOG) Pump 1 Each SubCutaneous Continuous Pump  losartan 100 milliGRAM(s) Oral daily  pantoprazole    Tablet 40 milliGRAM(s) Oral before breakfast  predniSONE   Tablet 20 milliGRAM(s) Oral daily    MEDICATIONS  (PRN):  dextrose 40% Gel 15 Gram(s) Oral once PRN Blood Glucose LESS THAN 70 milliGRAM(s)/deciliter  glucagon  Injectable 1 milliGRAM(s) IntraMuscular once PRN Glucose LESS THAN 70 milligrams/deciliter  lidocaine 2% Viscous 10 milliLiter(s) Swish and Spit every 6 hours PRN sore throat      Vent settings (if applicable)      Vital Signs Last 24 Hrs  T(C): 37.1 (21 Jan 2019 11:48), Max: 37.1 (21 Jan 2019 11:48)  T(F): 98.8 (21 Jan 2019 11:48), Max: 98.8 (21 Jan 2019 11:48)  HR: 97 (21 Jan 2019 11:48) (72 - 97)  BP: 158/76 (21 Jan 2019 11:48) (147/65 - 159/78)  BP(mean): --  RR: 18 (21 Jan 2019 11:48) (18 - 18)  SpO2: 97% (21 Jan 2019 11:48) (96% - 98%)          01-20 @ 07:01  -  01-21 @ 07:00  --------------------------------------------------------  IN: 840 mL / OUT: 0 mL / NET: 840 mL          LABS:                        13.6   6.85  )-----------( 231      ( 20 Jan 2019 08:14 )             43.2     01-20    138  |  102  |  19  ----------------------------<  224<H>  4.4   |  24  |  1.26    Ca    8.9      20 Jan 2019 07:06            CAPILLARY BLOOD GLUCOSE      POCT Blood Glucose.: 160 mg/dL (21 Jan 2019 11:47)              CULTURES:  Culture Results:   No Strep pyogenes (Group A) isolated (01-19 @ 20:45)    Most recent blood culture -- 01-19 @ 20:45   -- -- .Throat Throat 01-19 @ 20:45      Physical Examination:  Awake and alert, generally comfortable except for sore throat, slight dry cough  HEENT: unremarkable  PULM: Clear to auscultation bilaterally, no significant sputum production  CVS: Regular rate and rhythm, no murmurs, rubs, or gallops  Abd:  soft, non tender  Extrem: No CCE    RADIOLOGY REVIEWED  CXR:    CT chest:

## 2019-01-21 NOTE — DISCHARGE NOTE ADULT - ADDITIONAL INSTRUCTIONS
If you experience any "choking" symptoms or problems breathing - seek medical attention immediately  Make appointments to follow up with your physicians - bring all discharge paperwork including discharge medication list to follow up appointments

## 2019-01-21 NOTE — DISCHARGE NOTE ADULT - PATIENT PORTAL LINK FT
You can access the [facility] Patient Portal, offered by [facility], by registering with the following website: [email or web address]

## 2019-01-21 NOTE — PROGRESS NOTE ADULT - SUBJECTIVE AND OBJECTIVE BOX
Patient is a 58y old  Male who presents with a chief complaint of sore throat (21 Jan 2019 12:50)      INTERVAL HPI/OVERNIGHT EVENTS: improved odynophagia, able to tolerate po w lidocaine rinse      Vital Signs Last 24 Hrs  T(C): 37.1 (21 Jan 2019 11:48), Max: 37.1 (21 Jan 2019 11:48)  T(F): 98.8 (21 Jan 2019 11:48), Max: 98.8 (21 Jan 2019 11:48)  HR: 97 (21 Jan 2019 11:48) (83 - 97)  BP: 158/76 (21 Jan 2019 11:48) (147/65 - 159/78)  BP(mean): --  RR: 18 (21 Jan 2019 11:48) (18 - 18)  SpO2: 97% (21 Jan 2019 11:48) (96% - 98%)    acyclovir    Suspension 400 milliGRAM(s) Oral three times a day  buDESOnide 160 MICROgram(s)/formoterol 4.5 MICROgram(s) Inhaler 2 Puff(s) Inhalation two times a day  dextrose 40% Gel 15 Gram(s) Oral once PRN  dextrose 5%. 1000 milliLiter(s) IV Continuous <Continuous>  dextrose 50% Injectable 12.5 Gram(s) IV Push once  dextrose 50% Injectable 25 Gram(s) IV Push once  dextrose 50% Injectable 25 Gram(s) IV Push once  glucagon  Injectable 1 milliGRAM(s) IntraMuscular once PRN  insulin lispro (HumaLOG) Pump 1 Each SubCutaneous Continuous Pump  lidocaine 2% Viscous 10 milliLiter(s) Swish and Spit every 6 hours PRN  losartan 100 milliGRAM(s) Oral daily  pantoprazole    Tablet 40 milliGRAM(s) Oral before breakfast  predniSONE   Tablet 20 milliGRAM(s) Oral daily      PHYSICAL EXAM:  GENERAL: NAD,   EYES: conjunctiva and sclera clear  ENMT: Moist mucous membranes  NECK: Supple, No JVD, Normal thyroid  NERVOUS SYSTEM:  Alert & Oriented X3,   CHEST/LUNG: Clear to auscultation bilaterally; No rales, rhonchi, wheezing, or rubs  HEART: Regular rate and rhythm; No murmurs, rubs, or gallops  ABDOMEN: Soft, Nontender, Nondistended; Bowel sounds present  EXTREMITIES:  2+ Peripheral Pulses, No clubbing, cyanosis, or edema  LYMPH: No lymphadenopathy noted  SKIN: No rashes or lesions    Consultant(s) Notes Reviewed:  [x ] YES  [ ] NO  Care Discussed with Consultants/Other Providers [ x] YES  [ ] NO    LABS:                        13.6   6.85  )-----------( 231      ( 20 Jan 2019 08:14 )             43.2     01-20    138  |  102  |  19  ----------------------------<  224<H>  4.4   |  24  |  1.26    Ca    8.9      20 Jan 2019 07:06          CAPILLARY BLOOD GLUCOSE      POCT Blood Glucose.: 160 mg/dL (21 Jan 2019 11:47)  POCT Blood Glucose.: 165 mg/dL (21 Jan 2019 07:59)  POCT Blood Glucose.: 216 mg/dL (20 Jan 2019 21:24)            RADIOLOGY & ADDITIONAL TESTS:    Imaging Personally Reviewed:  [x ] YES  [ ] NO

## 2019-01-21 NOTE — DISCHARGE NOTE ADULT - CARE PROVIDER_API CALL
Victor Manuel Park), Cardiology; Internal Medicine  [address] [place] [address]  Phone: [phone]  Fax: [phone]    Spencer Arredondo), EndocrinologyMetabDiabetes; Internal Medicine  [address] [address]  Phone: [phone]  Fax: [phone]    René Dennis), Otolaryngology  [address] [address]  Phone: [phone]  Fax: [phone]

## 2019-01-21 NOTE — PROGRESS NOTE ADULT - SUBJECTIVE AND OBJECTIVE BOX
ENT ISSUE/POD: Sore throat, posterior pharynx ulcerations    HPI: 59 yo male seen by ENT for sore throat, noted to have ulcerations to posterior pharynx on laryngoscopy, likely viral. Pt was started on lidocaine swish and spit. Pt tolerating regular diet, with improvement nof pain temporarily. Pt since started on antiviral suspension. Denies change in voice, difficulty breathing. WBC and temp WNL          PAST MEDICAL & SURGICAL HISTORY:  GERD (gastroesophageal reflux disease)  HTN (hypertension)  DM (diabetes mellitus)  S/P appendectomy    Allergies    penicillins (Rash)    Intolerances      MEDICATIONS  (STANDING):  acyclovir    Suspension 400 milliGRAM(s) Oral three times a day  buDESOnide 160 MICROgram(s)/formoterol 4.5 MICROgram(s) Inhaler 2 Puff(s) Inhalation two times a day  dextrose 5%. 1000 milliLiter(s) (50 mL/Hr) IV Continuous <Continuous>  dextrose 50% Injectable 12.5 Gram(s) IV Push once  dextrose 50% Injectable 25 Gram(s) IV Push once  dextrose 50% Injectable 25 Gram(s) IV Push once  insulin lispro (HumaLOG) Pump 1 Each SubCutaneous Continuous Pump  losartan 100 milliGRAM(s) Oral daily  pantoprazole    Tablet 40 milliGRAM(s) Oral before breakfast  predniSONE   Tablet 20 milliGRAM(s) Oral daily    MEDICATIONS  (PRN):  dextrose 40% Gel 15 Gram(s) Oral once PRN Blood Glucose LESS THAN 70 milliGRAM(s)/deciliter  glucagon  Injectable 1 milliGRAM(s) IntraMuscular once PRN Glucose LESS THAN 70 milligrams/deciliter  lidocaine 2% Viscous 10 milliLiter(s) Swish and Spit every 6 hours PRN sore throat      ROS:   ENT: all negative except as noted in HPI   Pulm: denies SOB, cough, hemoptysis  Neuro: denies numbness/tingling, loss of sensation  Endo: denies heat/cold intolerance, excessive sweating      Vital Signs Last 24 Hrs  T(C): 36.6 (21 Jan 2019 04:44), Max: 37 (20 Jan 2019 14:13)  T(F): 97.8 (21 Jan 2019 04:44), Max: 98.6 (20 Jan 2019 14:13)  HR: 83 (21 Jan 2019 04:44) (72 - 90)  BP: 159/78 (21 Jan 2019 04:44) (147/65 - 159/78)  BP(mean): --  RR: 18 (21 Jan 2019 04:44) (18 - 18)  SpO2: 98% (21 Jan 2019 04:44) (96% - 98%)                          13.6   6.85  )-----------( 231      ( 20 Jan 2019 08:14 )             43.2    01-20    138  |  102  |  19  ----------------------------<  224<H>  4.4   |  24  |  1.26    Ca    8.9      20 Jan 2019 07:06    TPro  6.6  /  Alb  3.8  /  TBili  0.2  /  DBili  x   /  AST  17  /  ALT  33  /  AlkPhos  141<H>  01-19   PT/INR - ( 19 Jan 2019 10:10 )   PT: 10.9 sec;   INR: 0.96 ratio         PTT - ( 19 Jan 2019 10:10 )  PTT:28.6 sec    PHYSICAL EXAM:  Gen: NAD, speaking in complete sentences without difficulty  Skin: No rashes, bruises, or lesions  Head: Normocephalic, Atraumatic  Face: no edema, erythema, or fluctuance. Parotid glands soft without mass  Eyes: no scleral injection  Nose: Nares bilaterally patent, no discharge  Mouth: No Stridor / Drooling / Trismus.  Mucosa moist, tongue/uvula midline, oropharynx clear, non erythematous, no exudates, no vesicles   Neck: Flat, supple, no lymphadenopathy, trachea midline, no masses  Lymphatic: No lymphadenopathy  Resp: breathing easily, no stridor  Neuro: facial nerve intact, no facial droop

## 2019-01-21 NOTE — DISCHARGE NOTE ADULT - MEDICATION SUMMARY - MEDICATIONS TO TAKE
I will START or STAY ON the medications listed below when I get home from the hospital:    predniSONE 20 mg oral tablet  -- 1 tab(s) by mouth once a day  -- Indication: For prophylactic    losartan 100 mg oral tablet  -- 1 tab(s) by mouth once a day  -- Indication: For Essential hypertension    ezetimibe 10 mg oral tablet  -- 1 tab(s) by mouth once a day  -- Indication: For hyperlipidemia    acyclovir 200 mg/5 mL oral suspension  -- 10 milliliter(s) by mouth 3 times a day  -- Indication: For Throat ulcer    Dulera 200 mcg-5 mcg/inh inhalation aerosol  -- 2 puff(s) inhaled 2 times a day  -- Indication: For Shortness of breath    lidocaine 2% mucous membrane solution  -- 10 milliliter(s) mucous membrane every 6 hours, Swish and spit   As Needed for throat pain  -- Indication: For Throat ulcer pain

## 2019-01-22 LAB
GLUCOSE BLDC GLUCOMTR-MCNC: 154 MG/DL — HIGH (ref 70–99)
GLUCOSE BLDC GLUCOMTR-MCNC: 158 MG/DL — HIGH (ref 70–99)

## 2019-04-15 ENCOUNTER — RECORD ABSTRACTING (OUTPATIENT)
Age: 59
End: 2019-04-15

## 2019-04-15 DIAGNOSIS — Z86.19 PERSONAL HISTORY OF OTHER INFECTIOUS AND PARASITIC DISEASES: ICD-10-CM

## 2019-12-24 ENCOUNTER — LABORATORY RESULT (OUTPATIENT)
Age: 59
End: 2019-12-24

## 2019-12-24 ENCOUNTER — APPOINTMENT (OUTPATIENT)
Dept: CARDIOLOGY | Facility: CLINIC | Age: 59
End: 2019-12-24
Payer: COMMERCIAL

## 2019-12-24 ENCOUNTER — NON-APPOINTMENT (OUTPATIENT)
Age: 59
End: 2019-12-24

## 2019-12-24 VITALS
TEMPERATURE: 98.5 F | HEIGHT: 70 IN | DIASTOLIC BLOOD PRESSURE: 80 MMHG | BODY MASS INDEX: 36.79 KG/M2 | WEIGHT: 257 LBS | SYSTOLIC BLOOD PRESSURE: 140 MMHG

## 2019-12-24 VITALS — DIASTOLIC BLOOD PRESSURE: 72 MMHG | SYSTOLIC BLOOD PRESSURE: 132 MMHG

## 2019-12-24 DIAGNOSIS — Z80.51 FAMILY HISTORY OF MALIGNANT NEOPLASM OF KIDNEY: ICD-10-CM

## 2019-12-24 DIAGNOSIS — Z82.5 FAMILY HISTORY OF ASTHMA AND OTHER CHRONIC LOWER RESPIRATORY DISEASES: ICD-10-CM

## 2019-12-24 DIAGNOSIS — K21.9 GASTRO-ESOPHAGEAL REFLUX DISEASE W/OUT ESOPHAGITIS: ICD-10-CM

## 2019-12-24 LAB
CREAT SPEC-SCNC: 136 MG/DL
MICROALBUMIN 24H UR DL<=1MG/L-MCNC: 21.6 MG/DL
MICROALBUMIN/CREAT 24H UR-RTO: 159 MG/G

## 2019-12-24 PROCEDURE — A9500: CPT

## 2019-12-24 PROCEDURE — 93306 TTE W/DOPPLER COMPLETE: CPT

## 2019-12-24 PROCEDURE — 78452 HT MUSCLE IMAGE SPECT MULT: CPT

## 2019-12-24 PROCEDURE — 93015 CV STRESS TEST SUPVJ I&R: CPT

## 2019-12-24 PROCEDURE — 93000 ELECTROCARDIOGRAM COMPLETE: CPT

## 2019-12-24 PROCEDURE — 99396 PREV VISIT EST AGE 40-64: CPT

## 2019-12-24 RX ORDER — FAMOTIDINE 40 MG/1
40 TABLET, FILM COATED ORAL
Refills: 0 | Status: ACTIVE | COMMUNITY
Start: 2019-12-24

## 2019-12-24 RX ORDER — CHROMIUM 200 MCG
25 MCG TABLET ORAL
Refills: 0 | Status: ACTIVE | COMMUNITY
Start: 2019-12-24

## 2019-12-24 RX ORDER — MOMETASONE FUROATE AND FORMOTEROL FUMARATE DIHYDRATE 100; 5 UG/1; UG/1
100-5 AEROSOL RESPIRATORY (INHALATION)
Refills: 0 | Status: ACTIVE | COMMUNITY
Start: 2019-12-24

## 2019-12-24 RX ORDER — ALBUTEROL SULFATE 0.63 MG/3ML
0.63 SOLUTION RESPIRATORY (INHALATION) 4 TIMES DAILY
Qty: 1 | Refills: 2 | Status: ACTIVE | COMMUNITY
Start: 2019-12-24

## 2019-12-24 RX ORDER — EZETIMIBE 10 MG/1
10 TABLET ORAL
Refills: 0 | Status: ACTIVE | COMMUNITY
Start: 2019-12-24

## 2019-12-24 RX ORDER — LOSARTAN POTASSIUM 100 MG/1
100 TABLET, FILM COATED ORAL DAILY
Refills: 0 | Status: ACTIVE | COMMUNITY
Start: 2019-12-24

## 2019-12-24 NOTE — HISTORY OF PRESENT ILLNESS
[de-identified] : This is a 59 year old gentlemen with a PMH of HTN, HLD, Asthma, GERD, CHERELLE, Type 1 DM, and Vitamin D deficiency presents today for annual wellness and to establish care at our office. Patient states that recently his Asthma has been uncontrolled and he has been following closely with Dr. Gray. Patient states that he has been taking 40 mg of Prednisone daily, using an albuterol inhaler and the Dulera to control his asthma symptoms. Patient explains that on Thursday Dec 19, 2019 patient was tried to wean from 40 mg to 30 mg of prednisone, however, he began to have a "heavy pressure" sensation in his chest and began to experience heavy breathing. Patient states that he was told by Dr. Gray to increase the dosage to 40 mg. the sensation disappeared and the patient explains that he can breath better and no longer feels the pressure to his chest. Patient also states that since being on the prednisone he feels weak and tired. Pateint states that he does have a residual cough that he says is resolving. Today, Patient denies dyspnea, palpitations, chest pain, nausea, vomiting, dizziness and lightheadedness.\par  [FreeTextEntry1] : Annual wellness exam

## 2019-12-24 NOTE — PHYSICAL EXAM
[No Acute Distress] : no acute distress [Well Nourished] : well nourished [Well Developed] : well developed [Well-Appearing] : well-appearing [Normal Sclera/Conjunctiva] : normal sclera/conjunctiva [PERRL] : pupils equal round and reactive to light [EOMI] : extraocular movements intact [Normal Outer Ear/Nose] : the outer ears and nose were normal in appearance [Normal Oropharynx] : the oropharynx was normal [No JVD] : no jugular venous distention [No Lymphadenopathy] : no lymphadenopathy [Supple] : supple [Thyroid Normal, No Nodules] : the thyroid was normal and there were no nodules present [No Respiratory Distress] : no respiratory distress  [No Accessory Muscle Use] : no accessory muscle use [Clear to Auscultation] : lungs were clear to auscultation bilaterally [Normal Rate] : normal rate  [Regular Rhythm] : with a regular rhythm [Normal S1, S2] : normal S1 and S2 [No Carotid Bruits] : no carotid bruits [No Murmur] : no murmur heard [No Abdominal Bruit] : a ~M bruit was not heard ~T in the abdomen [Pedal Pulses Present] : the pedal pulses are present [No Varicosities] : no varicosities [No Palpable Aorta] : no palpable aorta [No Edema] : there was no peripheral edema [Soft] : abdomen soft [No Extremity Clubbing/Cyanosis] : no extremity clubbing/cyanosis [Non Tender] : non-tender [No Masses] : no abdominal mass palpated [Non-distended] : non-distended [No HSM] : no HSM [Normal Bowel Sounds] : normal bowel sounds [Normal Posterior Cervical Nodes] : no posterior cervical lymphadenopathy [Normal Anterior Cervical Nodes] : no anterior cervical lymphadenopathy [No CVA Tenderness] : no CVA  tenderness [No Joint Swelling] : no joint swelling [No Spinal Tenderness] : no spinal tenderness [Grossly Normal Strength/Tone] : grossly normal strength/tone [No Rash] : no rash [No Focal Deficits] : no focal deficits [Coordination Grossly Intact] : coordination grossly intact [Normal Gait] : normal gait [Deep Tendon Reflexes (DTR)] : deep tendon reflexes were 2+ and symmetric [Normal Insight/Judgement] : insight and judgment were intact [Normal Affect] : the affect was normal [FreeTextEntry1] : normal prostate  [de-identified] : normal genitals

## 2019-12-30 LAB
25(OH)D3 SERPL-MCNC: 32.3 NG/ML
ALBUMIN SERPL ELPH-MCNC: 4.2 G/DL
ALP BLD-CCNC: 216 U/L
ALT SERPL-CCNC: 130 U/L
ANION GAP SERPL CALC-SCNC: 13 MMOL/L
APPEARANCE: CLEAR
AST SERPL-CCNC: 80 U/L
BACTERIA: NEGATIVE
BASOPHILS # BLD AUTO: 0.02 K/UL
BASOPHILS NFR BLD AUTO: 0.2 %
BILIRUB DIRECT SERPL-MCNC: 0.1 MG/DL
BILIRUB INDIRECT SERPL-MCNC: 0.1 MG/DL
BILIRUB SERPL-MCNC: 0.2 MG/DL
BILIRUBIN URINE: NEGATIVE
BLOOD URINE: NEGATIVE
BUN SERPL-MCNC: 19 MG/DL
CALCIUM SERPL-MCNC: 9.9 MG/DL
CHLORIDE SERPL-SCNC: 106 MMOL/L
CHOLEST SERPL-MCNC: 230 MG/DL
CHOLEST/HDLC SERPL: 2.4 RATIO
CO2 SERPL-SCNC: 25 MMOL/L
COLOR: YELLOW
CREAT SERPL-MCNC: 1.42 MG/DL
EOSINOPHIL # BLD AUTO: 0.03 K/UL
EOSINOPHIL NFR BLD AUTO: 0.3 %
ESTIMATED AVERAGE GLUCOSE: 206 MG/DL
GLUCOSE QUALITATIVE U: NORMAL
GLUCOSE SERPL-MCNC: 133 MG/DL
HBA1C MFR BLD HPLC: 8.8 %
HCT VFR BLD CALC: 45.5 %
HDLC SERPL-MCNC: 96 MG/DL
HGB BLD-MCNC: 14.1 G/DL
HYALINE CASTS: 1 /LPF
IMM GRANULOCYTES NFR BLD AUTO: 0.5 %
KETONES URINE: NEGATIVE
LDLC SERPL CALC-MCNC: 115 MG/DL
LEUKOCYTE ESTERASE URINE: NEGATIVE
LYMPHOCYTES # BLD AUTO: 1.79 K/UL
LYMPHOCYTES NFR BLD AUTO: 16.3 %
MAGNESIUM SERPL-MCNC: 2 MG/DL
MAN DIFF?: NORMAL
MCHC RBC-ENTMCNC: 26.7 PG
MCHC RBC-ENTMCNC: 31 GM/DL
MCV RBC AUTO: 86.2 FL
MICROSCOPIC-UA: NORMAL
MONOCYTES # BLD AUTO: 0.63 K/UL
MONOCYTES NFR BLD AUTO: 5.7 %
NEUTROPHILS # BLD AUTO: 8.46 K/UL
NEUTROPHILS NFR BLD AUTO: 77 %
NITRITE URINE: NEGATIVE
PH URINE: 6
PHOSPHATE SERPL-MCNC: 2.6 MG/DL
PLATELET # BLD AUTO: 274 K/UL
POTASSIUM SERPL-SCNC: 4.9 MMOL/L
PROT SERPL-MCNC: 6.4 G/DL
PROTEIN URINE: ABNORMAL
PSA SERPL-MCNC: 2.97 NG/ML
RBC # BLD: 5.28 M/UL
RBC # FLD: 15.5 %
RED BLOOD CELLS URINE: 0 /HPF
SODIUM SERPL-SCNC: 144 MMOL/L
SPECIFIC GRAVITY URINE: 1.03
SQUAMOUS EPITHELIAL CELLS: 1 /HPF
T3RU NFR SERPL: 0.3 TBI
T4 FREE SERPL-MCNC: 1.1 NG/DL
T4 SERPL-MCNC: 3.5 UG/DL
TRIGL SERPL-MCNC: 97 MG/DL
TSH SERPL-ACNC: 1.6 UIU/ML
URATE SERPL-MCNC: 3.6 MG/DL
UROBILINOGEN URINE: NORMAL
WBC # FLD AUTO: 10.99 K/UL
WHITE BLOOD CELLS URINE: 1 /HPF

## 2020-01-21 ENCOUNTER — APPOINTMENT (OUTPATIENT)
Dept: SURGERY | Facility: CLINIC | Age: 60
End: 2020-01-21
Payer: COMMERCIAL

## 2020-01-21 VITALS
HEIGHT: 70 IN | OXYGEN SATURATION: 98 % | BODY MASS INDEX: 35.79 KG/M2 | DIASTOLIC BLOOD PRESSURE: 82 MMHG | WEIGHT: 250 LBS | RESPIRATION RATE: 16 BRPM | SYSTOLIC BLOOD PRESSURE: 145 MMHG | HEART RATE: 113 BPM | TEMPERATURE: 99.3 F

## 2020-01-21 PROCEDURE — 99243 OFF/OP CNSLTJ NEW/EST LOW 30: CPT

## 2020-01-21 RX ORDER — UBIDECARENONE 200 MG
CAPSULE ORAL
Refills: 0 | Status: ACTIVE | COMMUNITY

## 2020-01-21 RX ORDER — INSULIN LISPRO 100 [IU]/ML
100 INJECTION, SOLUTION INTRAVENOUS; SUBCUTANEOUS
Refills: 0 | Status: ACTIVE | COMMUNITY

## 2020-01-21 RX ORDER — ZOSTER VACCINE RECOMBINANT, ADJUVANTED 50 MCG/0.5
50 KIT INTRAMUSCULAR
Qty: 2 | Refills: 0 | Status: DISCONTINUED | COMMUNITY
Start: 2019-12-24 | End: 2020-01-21

## 2020-01-21 RX ORDER — LACTOBACILLUS ACIDOPHILUS/PECT 30 MG-20MG
TABLET ORAL
Refills: 0 | Status: ACTIVE | COMMUNITY

## 2020-01-21 RX ORDER — PREDNISONE 10 MG/1
10 TABLET ORAL EVERY MORNING
Refills: 0 | Status: DISCONTINUED | COMMUNITY
Start: 2019-12-24 | End: 2020-01-21

## 2020-01-21 NOTE — CONSULT LETTER
[Dear  ___] : Dear ~MINGO, [Consult Letter:] : I had the pleasure of evaluating your patient, [unfilled]. [Please see my note below.] : Please see my note below. [Consult Closing:] : Thank you very much for allowing me to participate in the care of this patient.  If you have any questions, please do not hesitate to contact me. [Sincerely,] : Sincerely, [FreeTextEntry3] : Huber Gaffney M.D., MAYO.GORAN., F.DUSTIN.S.VALARIERGarretS.\[facility] Chief Colorectal Clinical Services, [facility] [FreeTextEntry2] : Dr. Iván Newsome [DrGarret  ___] : Dr. OCHOA

## 2020-01-21 NOTE — HISTORY OF PRESENT ILLNESS
[FreeTextEntry1] : Karthikeyan is a 58 y/o male here for evaluation of rectal pain. Patient reports initial encounter of perianal swelling and pain in the past 3-4 days. Drainage and bleeding began yesterday and pain almost subsided. \par Pt currently on Cipro/Flagyl. Fever 101 over the weekend. Temp currently 99.3. Was on prednisone for asthma exacerbation. Last took 1/19.  He has bowel movements once daily without recent changes. He denies diarrhea and abdominal pain. No family history of inflammatory bowel disease.

## 2020-01-21 NOTE — PHYSICAL EXAM
[Normal Breath Sounds] : Normal breath sounds [Normal Heart Sounds] : normal heart sounds [Normal Rate and Rhythm] : normal rate and rhythm [Alert] : alert [No Rash or Lesion] : No rash or lesion [Oriented to Person] : oriented to person [Oriented to Time] : oriented to time [Oriented to Place] : oriented to place [Calm] : calm [Abdomen Masses] : No abdominal masses [Abdomen Tenderness] : ~T No ~M abdominal tenderness [JVD] : no jugular venous distention  [de-identified] : soft, nondistended. +BS [de-identified] : WNL [de-identified] : Well nourished male, in no apparent distress [de-identified] : Full ROM [FreeTextEntry1] : Perianal inspection demonstrated left posterior ischiorectal swelling with a 1 cm open wound draining seropurulent material.  Minimal surrounding tenderness. Internal exam and anoscopy deferred because of pain.

## 2020-01-21 NOTE — ASSESSMENT
[FreeTextEntry1] : I have seen and evaluated patient and I have corroborated all nursing input into this note. Patient with ischiorectal abscess which spontaneously drained. There is a good sized drainage wound. Therefore further intervention not needed at this time. Patient will continue sitz baths and complete his course of antibiotics. I informed the patient that approximately 50% of patients who have ischiorectal abscesses will develop a fistula and/or a recurrent abscess. The patient will be followed until either a fistula is identified or healing is complete

## 2020-01-28 DIAGNOSIS — R97.20 ELEVATED PROSTATE, SPECIFIC ANTIGEN [PSA]: ICD-10-CM

## 2020-01-28 DIAGNOSIS — R79.89 OTHER SPECIFIED ABNORMAL FINDINGS OF BLOOD CHEMISTRY: ICD-10-CM

## 2020-02-06 ENCOUNTER — APPOINTMENT (OUTPATIENT)
Dept: SURGERY | Facility: CLINIC | Age: 60
End: 2020-02-06
Payer: COMMERCIAL

## 2020-02-06 VITALS
DIASTOLIC BLOOD PRESSURE: 87 MMHG | RESPIRATION RATE: 15 BRPM | HEART RATE: 85 BPM | OXYGEN SATURATION: 97 % | SYSTOLIC BLOOD PRESSURE: 133 MMHG | TEMPERATURE: 98.8 F

## 2020-02-06 PROCEDURE — 99213 OFFICE O/P EST LOW 20 MIN: CPT | Mod: 25

## 2020-02-06 PROCEDURE — 46600 DIAGNOSTIC ANOSCOPY SPX: CPT

## 2020-02-06 RX ORDER — METRONIDAZOLE 500 MG/1
500 TABLET, FILM COATED ORAL
Refills: 0 | Status: DISCONTINUED | COMMUNITY
End: 2020-02-06

## 2020-02-06 RX ORDER — METRONIDAZOLE 500 MG/1
500 TABLET ORAL
Refills: 0 | Status: DISCONTINUED | COMMUNITY
End: 2020-02-06

## 2020-02-06 NOTE — ASSESSMENT
[FreeTextEntry1] : Complete healing. No evidence of fistula. Risk of recurrence reviewed. Followup p.r.n.

## 2020-02-06 NOTE — PHYSICAL EXAM
[FreeTextEntry1] : Perianal inspection digital exam and anoscopy demonstrated complete healing. There was no evidence of a fistula.

## 2020-02-06 NOTE — HISTORY OF PRESENT ILLNESS
[FreeTextEntry1] : Karthikeyan is a 60 y/o male here for 2 week follow up visit. Last seen on 1/21/20, patient with an ischiorectal abscess which spontaneously drained. No further intervention needed at that time. Patient instructed to continue sitz baths and complete his course of antibiotics. \par Today he reports feeling much better - no pain or drainage.

## 2020-03-27 ENCOUNTER — APPOINTMENT (OUTPATIENT)
Dept: CARDIOLOGY | Facility: CLINIC | Age: 60
End: 2020-03-27

## 2020-05-21 DIAGNOSIS — U07.1 COVID-19: ICD-10-CM

## 2020-05-24 LAB
ALBUMIN SERPL ELPH-MCNC: 4.1 G/DL
ALP BLD-CCNC: 190 U/L
ALT SERPL-CCNC: 57 U/L
ANION GAP SERPL CALC-SCNC: 13 MMOL/L
AST SERPL-CCNC: 41 U/L
BASOPHILS # BLD AUTO: 0.04 K/UL
BASOPHILS NFR BLD AUTO: 0.6 %
BILIRUB DIRECT SERPL-MCNC: 0.1 MG/DL
BILIRUB INDIRECT SERPL-MCNC: 0.3 MG/DL
BILIRUB SERPL-MCNC: 0.4 MG/DL
BUN SERPL-MCNC: 14 MG/DL
CALCIUM SERPL-MCNC: 8.9 MG/DL
CHLORIDE SERPL-SCNC: 101 MMOL/L
CHOLEST SERPL-MCNC: 208 MG/DL
CHOLEST/HDLC SERPL: 3.6 RATIO
CO2 SERPL-SCNC: 23 MMOL/L
CREAT SERPL-MCNC: 1.32 MG/DL
EOSINOPHIL # BLD AUTO: 0.29 K/UL
EOSINOPHIL NFR BLD AUTO: 4.5 %
ESTIMATED AVERAGE GLUCOSE: 200 MG/DL
GLUCOSE SERPL-MCNC: 314 MG/DL
HBA1C MFR BLD HPLC: 8.6 %
HCT VFR BLD CALC: 45.9 %
HDLC SERPL-MCNC: 57 MG/DL
HGB BLD-MCNC: 14.1 G/DL
IMM GRANULOCYTES NFR BLD AUTO: 0.2 %
LDLC SERPL CALC-MCNC: 130 MG/DL
LYMPHOCYTES # BLD AUTO: 2.06 K/UL
LYMPHOCYTES NFR BLD AUTO: 32.2 %
MAN DIFF?: NORMAL
MCHC RBC-ENTMCNC: 26 PG
MCHC RBC-ENTMCNC: 30.7 GM/DL
MCV RBC AUTO: 84.7 FL
MONOCYTES # BLD AUTO: 0.6 K/UL
MONOCYTES NFR BLD AUTO: 9.4 %
NEUTROPHILS # BLD AUTO: 3.39 K/UL
NEUTROPHILS NFR BLD AUTO: 53.1 %
PLATELET # BLD AUTO: 274 K/UL
POTASSIUM SERPL-SCNC: 5.1 MMOL/L
PROT SERPL-MCNC: 6.3 G/DL
PSA SERPL-MCNC: 2.3 NG/ML
RBC # BLD: 5.42 M/UL
RBC # FLD: 13.8 %
SODIUM SERPL-SCNC: 137 MMOL/L
TRIGL SERPL-MCNC: 104 MG/DL
WBC # FLD AUTO: 6.39 K/UL

## 2020-05-31 LAB
SARS-COV-2 IGG SERPL IA-ACNC: 0.2 INDEX
SARS-COV-2 IGG SERPL QL IA: NEGATIVE

## 2020-09-03 ENCOUNTER — INPATIENT (INPATIENT)
Facility: HOSPITAL | Age: 60
LOS: 0 days | Discharge: HOME | DRG: 728 | End: 2020-09-04
Attending: INTERNAL MEDICINE | Admitting: INTERNAL MEDICINE
Payer: COMMERCIAL

## 2020-09-03 VITALS — WEIGHT: 240.08 LBS | HEIGHT: 69 IN

## 2020-09-03 DIAGNOSIS — R10.9 UNSPECIFIED ABDOMINAL PAIN: ICD-10-CM

## 2020-09-03 DIAGNOSIS — Z90.49 ACQUIRED ABSENCE OF OTHER SPECIFIED PARTS OF DIGESTIVE TRACT: Chronic | ICD-10-CM

## 2020-09-03 DIAGNOSIS — N45.1 EPIDIDYMITIS: ICD-10-CM

## 2020-09-03 LAB
ALBUMIN SERPL ELPH-MCNC: 3.6 G/DL — SIGNIFICANT CHANGE UP (ref 3.3–5)
ALP SERPL-CCNC: 131 U/L — HIGH (ref 40–120)
ALT FLD-CCNC: 47 U/L — SIGNIFICANT CHANGE UP (ref 12–78)
ANION GAP SERPL CALC-SCNC: 5 MMOL/L — SIGNIFICANT CHANGE UP (ref 5–17)
APPEARANCE UR: CLEAR — SIGNIFICANT CHANGE UP
APTT BLD: 33.1 SEC — SIGNIFICANT CHANGE UP (ref 27.5–35.5)
AST SERPL-CCNC: 24 U/L — SIGNIFICANT CHANGE UP (ref 15–37)
BASOPHILS # BLD AUTO: 0.04 K/UL — SIGNIFICANT CHANGE UP (ref 0–0.2)
BASOPHILS NFR BLD AUTO: 0.4 % — SIGNIFICANT CHANGE UP (ref 0–2)
BILIRUB SERPL-MCNC: 0.3 MG/DL — SIGNIFICANT CHANGE UP (ref 0.2–1.2)
BILIRUB UR-MCNC: NEGATIVE — SIGNIFICANT CHANGE UP
BUN SERPL-MCNC: 17 MG/DL — SIGNIFICANT CHANGE UP (ref 7–23)
CALCIUM SERPL-MCNC: 9.4 MG/DL — SIGNIFICANT CHANGE UP (ref 8.5–10.1)
CHLORIDE SERPL-SCNC: 108 MMOL/L — SIGNIFICANT CHANGE UP (ref 96–108)
CO2 SERPL-SCNC: 29 MMOL/L — SIGNIFICANT CHANGE UP (ref 22–31)
COLOR SPEC: YELLOW — SIGNIFICANT CHANGE UP
CREAT SERPL-MCNC: 1.49 MG/DL — HIGH (ref 0.5–1.3)
DIFF PNL FLD: NEGATIVE — SIGNIFICANT CHANGE UP
EOSINOPHIL # BLD AUTO: 0.36 K/UL — SIGNIFICANT CHANGE UP (ref 0–0.5)
EOSINOPHIL NFR BLD AUTO: 3.9 % — SIGNIFICANT CHANGE UP (ref 0–6)
GLUCOSE SERPL-MCNC: 171 MG/DL — HIGH (ref 70–99)
GLUCOSE UR QL: 1000 MG/DL
HCT VFR BLD CALC: 44.8 % — SIGNIFICANT CHANGE UP (ref 39–50)
HGB BLD-MCNC: 14.3 G/DL — SIGNIFICANT CHANGE UP (ref 13–17)
IMM GRANULOCYTES NFR BLD AUTO: 0.2 % — SIGNIFICANT CHANGE UP (ref 0–1.5)
INR BLD: 1.13 RATIO — SIGNIFICANT CHANGE UP (ref 0.88–1.16)
KETONES UR-MCNC: ABNORMAL
LACTATE SERPL-SCNC: 1.5 MMOL/L — SIGNIFICANT CHANGE UP (ref 0.7–2)
LEUKOCYTE ESTERASE UR-ACNC: NEGATIVE — SIGNIFICANT CHANGE UP
LIDOCAIN IGE QN: 62 U/L — LOW (ref 73–393)
LYMPHOCYTES # BLD AUTO: 1.89 K/UL — SIGNIFICANT CHANGE UP (ref 1–3.3)
LYMPHOCYTES # BLD AUTO: 20.7 % — SIGNIFICANT CHANGE UP (ref 13–44)
MCHC RBC-ENTMCNC: 26.6 PG — LOW (ref 27–34)
MCHC RBC-ENTMCNC: 31.9 GM/DL — LOW (ref 32–36)
MCV RBC AUTO: 83.3 FL — SIGNIFICANT CHANGE UP (ref 80–100)
MONOCYTES # BLD AUTO: 0.88 K/UL — SIGNIFICANT CHANGE UP (ref 0–0.9)
MONOCYTES NFR BLD AUTO: 9.6 % — SIGNIFICANT CHANGE UP (ref 2–14)
NEUTROPHILS # BLD AUTO: 5.95 K/UL — SIGNIFICANT CHANGE UP (ref 1.8–7.4)
NEUTROPHILS NFR BLD AUTO: 65.2 % — SIGNIFICANT CHANGE UP (ref 43–77)
NITRITE UR-MCNC: NEGATIVE — SIGNIFICANT CHANGE UP
PH UR: 7 — SIGNIFICANT CHANGE UP (ref 5–8)
PLATELET # BLD AUTO: 291 K/UL — SIGNIFICANT CHANGE UP (ref 150–400)
POTASSIUM SERPL-MCNC: 4.4 MMOL/L — SIGNIFICANT CHANGE UP (ref 3.5–5.3)
POTASSIUM SERPL-SCNC: 4.4 MMOL/L — SIGNIFICANT CHANGE UP (ref 3.5–5.3)
PROT SERPL-MCNC: 7.2 GM/DL — SIGNIFICANT CHANGE UP (ref 6–8.3)
PROT UR-MCNC: 100 MG/DL
PROTHROM AB SERPL-ACNC: 13.1 SEC — SIGNIFICANT CHANGE UP (ref 10.6–13.6)
RBC # BLD: 5.38 M/UL — SIGNIFICANT CHANGE UP (ref 4.2–5.8)
RBC # FLD: 13.8 % — SIGNIFICANT CHANGE UP (ref 10.3–14.5)
SARS-COV-2 IGG SERPL QL IA: NEGATIVE — SIGNIFICANT CHANGE UP
SARS-COV-2 IGM SERPL IA-ACNC: <3.8 AU/ML — SIGNIFICANT CHANGE UP
SARS-COV-2 RNA SPEC QL NAA+PROBE: SIGNIFICANT CHANGE UP
SODIUM SERPL-SCNC: 142 MMOL/L — SIGNIFICANT CHANGE UP (ref 135–145)
SP GR SPEC: 1.01 — SIGNIFICANT CHANGE UP (ref 1.01–1.02)
UROBILINOGEN FLD QL: NEGATIVE MG/DL — SIGNIFICANT CHANGE UP
WBC # BLD: 9.14 K/UL — SIGNIFICANT CHANGE UP (ref 3.8–10.5)
WBC # FLD AUTO: 9.14 K/UL — SIGNIFICANT CHANGE UP (ref 3.8–10.5)

## 2020-09-03 PROCEDURE — 85027 COMPLETE CBC AUTOMATED: CPT

## 2020-09-03 PROCEDURE — 76870 US EXAM SCROTUM: CPT | Mod: 26

## 2020-09-03 PROCEDURE — 80048 BASIC METABOLIC PNL TOTAL CA: CPT

## 2020-09-03 PROCEDURE — 74176 CT ABD & PELVIS W/O CONTRAST: CPT | Mod: 26,59

## 2020-09-03 PROCEDURE — 36415 COLL VENOUS BLD VENIPUNCTURE: CPT

## 2020-09-03 PROCEDURE — 74177 CT ABD & PELVIS W/CONTRAST: CPT | Mod: 26

## 2020-09-03 PROCEDURE — 99253 IP/OBS CNSLTJ NEW/EST LOW 45: CPT

## 2020-09-03 PROCEDURE — 83036 HEMOGLOBIN GLYCOSYLATED A1C: CPT

## 2020-09-03 PROCEDURE — 99223 1ST HOSP IP/OBS HIGH 75: CPT | Mod: AI

## 2020-09-03 PROCEDURE — 86803 HEPATITIS C AB TEST: CPT

## 2020-09-03 PROCEDURE — U0003: CPT

## 2020-09-03 PROCEDURE — C9113: CPT

## 2020-09-03 PROCEDURE — 82962 GLUCOSE BLOOD TEST: CPT

## 2020-09-03 RX ORDER — HYDROMORPHONE HYDROCHLORIDE 2 MG/ML
1 INJECTION INTRAMUSCULAR; INTRAVENOUS; SUBCUTANEOUS
Refills: 0 | Status: DISCONTINUED | OUTPATIENT
Start: 2020-09-03 | End: 2020-09-04

## 2020-09-03 RX ORDER — SODIUM CHLORIDE 9 MG/ML
1000 INJECTION, SOLUTION INTRAVENOUS
Refills: 0 | Status: DISCONTINUED | OUTPATIENT
Start: 2020-09-03 | End: 2020-09-04

## 2020-09-03 RX ORDER — PANTOPRAZOLE SODIUM 20 MG/1
40 TABLET, DELAYED RELEASE ORAL EVERY 12 HOURS
Refills: 0 | Status: DISCONTINUED | OUTPATIENT
Start: 2020-09-03 | End: 2020-09-04

## 2020-09-03 RX ORDER — LOSARTAN POTASSIUM 100 MG/1
100 TABLET, FILM COATED ORAL DAILY
Refills: 0 | Status: DISCONTINUED | OUTPATIENT
Start: 2020-09-03 | End: 2020-09-04

## 2020-09-03 RX ORDER — DEXTROSE 50 % IN WATER 50 %
25 SYRINGE (ML) INTRAVENOUS ONCE
Refills: 0 | Status: DISCONTINUED | OUTPATIENT
Start: 2020-09-03 | End: 2020-09-04

## 2020-09-03 RX ORDER — INSULIN LISPRO 100/ML
1 VIAL (ML) SUBCUTANEOUS
Refills: 0 | Status: DISCONTINUED | OUTPATIENT
Start: 2020-09-03 | End: 2020-09-04

## 2020-09-03 RX ORDER — ENOXAPARIN SODIUM 100 MG/ML
40 INJECTION SUBCUTANEOUS DAILY
Refills: 0 | Status: DISCONTINUED | OUTPATIENT
Start: 2020-09-04 | End: 2020-09-04

## 2020-09-03 RX ORDER — ONDANSETRON 8 MG/1
4 TABLET, FILM COATED ORAL ONCE
Refills: 0 | Status: COMPLETED | OUTPATIENT
Start: 2020-09-03 | End: 2020-09-03

## 2020-09-03 RX ORDER — INSULIN LISPRO 100/ML
VIAL (ML) SUBCUTANEOUS
Refills: 0 | Status: DISCONTINUED | OUTPATIENT
Start: 2020-09-03 | End: 2020-09-04

## 2020-09-03 RX ORDER — INSULIN LISPRO 100/ML
VIAL (ML) SUBCUTANEOUS AT BEDTIME
Refills: 0 | Status: DISCONTINUED | OUTPATIENT
Start: 2020-09-03 | End: 2020-09-04

## 2020-09-03 RX ORDER — SODIUM CHLORIDE 9 MG/ML
1000 INJECTION INTRAMUSCULAR; INTRAVENOUS; SUBCUTANEOUS ONCE
Refills: 0 | Status: COMPLETED | OUTPATIENT
Start: 2020-09-03 | End: 2020-09-03

## 2020-09-03 RX ORDER — ACETAMINOPHEN 500 MG
650 TABLET ORAL EVERY 4 HOURS
Refills: 0 | Status: DISCONTINUED | OUTPATIENT
Start: 2020-09-03 | End: 2020-09-04

## 2020-09-03 RX ORDER — BUDESONIDE AND FORMOTEROL FUMARATE DIHYDRATE 160; 4.5 UG/1; UG/1
2 AEROSOL RESPIRATORY (INHALATION)
Refills: 0 | Status: DISCONTINUED | OUTPATIENT
Start: 2020-09-03 | End: 2020-09-04

## 2020-09-03 RX ORDER — DEXTROSE 50 % IN WATER 50 %
15 SYRINGE (ML) INTRAVENOUS ONCE
Refills: 0 | Status: DISCONTINUED | OUTPATIENT
Start: 2020-09-03 | End: 2020-09-04

## 2020-09-03 RX ORDER — GLUCAGON INJECTION, SOLUTION 0.5 MG/.1ML
1 INJECTION, SOLUTION SUBCUTANEOUS ONCE
Refills: 0 | Status: DISCONTINUED | OUTPATIENT
Start: 2020-09-03 | End: 2020-09-04

## 2020-09-03 RX ORDER — MORPHINE SULFATE 50 MG/1
4 CAPSULE, EXTENDED RELEASE ORAL ONCE
Refills: 0 | Status: DISCONTINUED | OUTPATIENT
Start: 2020-09-03 | End: 2020-09-03

## 2020-09-03 RX ORDER — ONDANSETRON 8 MG/1
4 TABLET, FILM COATED ORAL EVERY 6 HOURS
Refills: 0 | Status: DISCONTINUED | OUTPATIENT
Start: 2020-09-03 | End: 2020-09-04

## 2020-09-03 RX ORDER — HYDROMORPHONE HYDROCHLORIDE 2 MG/ML
0.5 INJECTION INTRAMUSCULAR; INTRAVENOUS; SUBCUTANEOUS ONCE
Refills: 0 | Status: DISCONTINUED | OUTPATIENT
Start: 2020-09-03 | End: 2020-09-03

## 2020-09-03 RX ORDER — SODIUM CHLORIDE 9 MG/ML
500 INJECTION INTRAMUSCULAR; INTRAVENOUS; SUBCUTANEOUS ONCE
Refills: 0 | Status: COMPLETED | OUTPATIENT
Start: 2020-09-03 | End: 2020-09-03

## 2020-09-03 RX ORDER — METOCLOPRAMIDE HCL 10 MG
10 TABLET ORAL ONCE
Refills: 0 | Status: COMPLETED | OUTPATIENT
Start: 2020-09-03 | End: 2020-09-03

## 2020-09-03 RX ORDER — KETOROLAC TROMETHAMINE 30 MG/ML
30 SYRINGE (ML) INJECTION ONCE
Refills: 0 | Status: DISCONTINUED | OUTPATIENT
Start: 2020-09-03 | End: 2020-09-03

## 2020-09-03 RX ORDER — INFLUENZA VIRUS VACCINE 15; 15; 15; 15 UG/.5ML; UG/.5ML; UG/.5ML; UG/.5ML
0.5 SUSPENSION INTRAMUSCULAR ONCE
Refills: 0 | Status: DISCONTINUED | OUTPATIENT
Start: 2020-09-03 | End: 2020-09-04

## 2020-09-03 RX ORDER — DEXTROSE 50 % IN WATER 50 %
12.5 SYRINGE (ML) INTRAVENOUS ONCE
Refills: 0 | Status: DISCONTINUED | OUTPATIENT
Start: 2020-09-03 | End: 2020-09-04

## 2020-09-03 RX ORDER — SODIUM CHLORIDE 9 MG/ML
1000 INJECTION, SOLUTION INTRAVENOUS
Refills: 0 | Status: DISCONTINUED | OUTPATIENT
Start: 2020-09-03 | End: 2020-09-03

## 2020-09-03 RX ORDER — HYDROMORPHONE HYDROCHLORIDE 2 MG/ML
0.5 INJECTION INTRAMUSCULAR; INTRAVENOUS; SUBCUTANEOUS
Refills: 0 | Status: DISCONTINUED | OUTPATIENT
Start: 2020-09-03 | End: 2020-09-04

## 2020-09-03 RX ADMIN — MORPHINE SULFATE 4 MILLIGRAM(S): 50 CAPSULE, EXTENDED RELEASE ORAL at 02:40

## 2020-09-03 RX ADMIN — HYDROMORPHONE HYDROCHLORIDE 1 MILLIGRAM(S): 2 INJECTION INTRAMUSCULAR; INTRAVENOUS; SUBCUTANEOUS at 21:13

## 2020-09-03 RX ADMIN — SODIUM CHLORIDE 1000 MILLILITER(S): 9 INJECTION INTRAMUSCULAR; INTRAVENOUS; SUBCUTANEOUS at 03:00

## 2020-09-03 RX ADMIN — Medication 30 MILLIGRAM(S): at 07:04

## 2020-09-03 RX ADMIN — SODIUM CHLORIDE 500 MILLILITER(S): 9 INJECTION INTRAMUSCULAR; INTRAVENOUS; SUBCUTANEOUS at 04:31

## 2020-09-03 RX ADMIN — MORPHINE SULFATE 4 MILLIGRAM(S): 50 CAPSULE, EXTENDED RELEASE ORAL at 03:18

## 2020-09-03 RX ADMIN — PANTOPRAZOLE SODIUM 40 MILLIGRAM(S): 20 TABLET, DELAYED RELEASE ORAL at 11:58

## 2020-09-03 RX ADMIN — Medication 30 MILLIGRAM(S): at 07:30

## 2020-09-03 RX ADMIN — SODIUM CHLORIDE 500 MILLILITER(S): 9 INJECTION INTRAMUSCULAR; INTRAVENOUS; SUBCUTANEOUS at 03:31

## 2020-09-03 RX ADMIN — ONDANSETRON 4 MILLIGRAM(S): 8 TABLET, FILM COATED ORAL at 02:00

## 2020-09-03 RX ADMIN — MORPHINE SULFATE 4 MILLIGRAM(S): 50 CAPSULE, EXTENDED RELEASE ORAL at 05:05

## 2020-09-03 RX ADMIN — SODIUM CHLORIDE 1000 MILLILITER(S): 9 INJECTION INTRAMUSCULAR; INTRAVENOUS; SUBCUTANEOUS at 02:00

## 2020-09-03 RX ADMIN — SODIUM CHLORIDE 100 MILLILITER(S): 9 INJECTION, SOLUTION INTRAVENOUS at 12:00

## 2020-09-03 RX ADMIN — ONDANSETRON 4 MILLIGRAM(S): 8 TABLET, FILM COATED ORAL at 22:31

## 2020-09-03 RX ADMIN — HYDROMORPHONE HYDROCHLORIDE 0.5 MILLIGRAM(S): 2 INJECTION INTRAMUSCULAR; INTRAVENOUS; SUBCUTANEOUS at 08:30

## 2020-09-03 RX ADMIN — MORPHINE SULFATE 4 MILLIGRAM(S): 50 CAPSULE, EXTENDED RELEASE ORAL at 02:39

## 2020-09-03 RX ADMIN — MORPHINE SULFATE 4 MILLIGRAM(S): 50 CAPSULE, EXTENDED RELEASE ORAL at 03:32

## 2020-09-03 RX ADMIN — HYDROMORPHONE HYDROCHLORIDE 1 MILLIGRAM(S): 2 INJECTION INTRAMUSCULAR; INTRAVENOUS; SUBCUTANEOUS at 22:00

## 2020-09-03 RX ADMIN — Medication 10 MILLIGRAM(S): at 11:17

## 2020-09-03 RX ADMIN — MORPHINE SULFATE 4 MILLIGRAM(S): 50 CAPSULE, EXTENDED RELEASE ORAL at 02:01

## 2020-09-03 RX ADMIN — HYDROMORPHONE HYDROCHLORIDE 0.5 MILLIGRAM(S): 2 INJECTION INTRAMUSCULAR; INTRAVENOUS; SUBCUTANEOUS at 08:15

## 2020-09-03 RX ADMIN — ONDANSETRON 4 MILLIGRAM(S): 8 TABLET, FILM COATED ORAL at 06:37

## 2020-09-03 RX ADMIN — LOSARTAN POTASSIUM 100 MILLIGRAM(S): 100 TABLET, FILM COATED ORAL at 16:58

## 2020-09-03 RX ADMIN — ONDANSETRON 4 MILLIGRAM(S): 8 TABLET, FILM COATED ORAL at 08:16

## 2020-09-03 NOTE — H&P ADULT - NSICDXPASTMEDICALHX_GEN_ALL_CORE_FT
PAST MEDICAL HISTORY:  DM (diabetes mellitus)     GERD (gastroesophageal reflux disease)     HTN (hypertension)

## 2020-09-03 NOTE — H&P ADULT - ASSESSMENT
#Abdominal pain likely 2 to epididymitis with mild RT-sided hydrocele  Admit to med-surg  Urology eval DR Grider  Started on IV Levaquin  ID eval for further IV abx  Pain meds prn  Antiemetics prn  Protonix BID  Clears for now, advance if not vomiting and able to tolerate    #Diabetes  On insulin pump  Endo eval for insulin pump management  ISS    #HTN  Cont home meds    #Mild elevation of creatinine  Likely dehydration from vomiting  Cont IVF  Monitor Creatinine    #COVID pending, low suspicion    #Dispo- inpatient admit. D/w pt

## 2020-09-03 NOTE — H&P ADULT - HISTORY OF PRESENT ILLNESS
58yo/M with PMH diabetes on insulin pump, HTN, hyperlipidemia, asthma presented with sudden onset of RT groin pain started last night, associated with vomiting. Denies fever. No diarrhea. Found to have RT-sided epididymitis.

## 2020-09-03 NOTE — ED ADULT NURSE NOTE - OBJECTIVE STATEMENT
patient c/o right lower abdominal pain that radiates to right flank started this evening. + vomiting, nausea. patient denies diarrhea, fever, chills, cough, chest pain, SOB. denies urinary symptoms. hx DM, HTN.

## 2020-09-03 NOTE — ED PROVIDER NOTE - PROGRESS NOTE DETAILS
pt with persistent suprapubic/rlq pain. no testicular pain. pt is s/p appendectomy. awaiting UA. Sergio Francisco M.D., Attending Physician pt with persistent suprapubic/rlq pain. pt complaining of mild testicular pain. testicular exam uncircumcised normal testicular lie no swelling or discoloration normal cremasteric reflex + ttp right testicle. pt is s/p appendectomy. awaiting UA and testicular US. Sergio Francisco M.D., Attending Physician pt signed o ut to me, had severe rlq and right testicular pain last night requiring 3 doses of morphine, pt sono shows epididymitis, still has not urinated. will obtain ct with iv contrast to assess, pain mostly in testicle patient perisstently vomiting, x 2 episodes when going to ct scan, still with pain - on 4th dose of pain meds, will await ct read. abd soft no rebound or guarding ctap with iv contrasst appreciated, lactate wnl, pt still with pain and vomiting.  scrotal exam done bym yself, no signs of torsion or infection, normal lie of teastis, normal cremasteric reflex, uncircumcized. will admit for intractable pain and vomiting. I did look at ny istop, pt has no history of drug abuse or narcotic use

## 2020-09-03 NOTE — ED ADULT NURSE REASSESSMENT NOTE - NS ED NURSE REASSESS COMMENT FT1
PT received @0700 from Juanito SHEPPARD RN resting calm in stretcher. As per night RN, pt recently medicated for pain with Toradol. Upon pain reassessment, pt still reports pain in right lower abdomen with radiation to back, denies urinary symptoms. Dr. Yip aware. PT sent to CT scan for further evaluation of pain. Pain medication ordered by MD and to be administered upon return from CT. VSS. No distress.

## 2020-09-03 NOTE — CONSULT NOTE ADULT - ASSESSMENT
Continue antibiotics, follow cultures, treat per sensitivity.   Ibuprofen 800 mg TID.   If pain well controlled can be sent home on oral antibiotics and Ibuprofen.   Follow up as out patient.

## 2020-09-03 NOTE — ED PROVIDER NOTE - PHYSICAL EXAMINATION
Constitutional: mild distress AAOx3  Eyes: PERRLA EOMI  Head: Normocephalic atraumatic  Mouth: MMM  Cardiac: regular rate   Resp: Lungs CTAB  GI: Abd s/nd + right cvat negative moscoso/mcburney  Neuro: CN2-12 intact  Skin: No rashes

## 2020-09-03 NOTE — CONSULT NOTE ADULT - SUBJECTIVE AND OBJECTIVE BOX
CHIEF COMPLAINT:    HISTORY OF PRESENT ILLNESS:  60 yo M with presented with right lower abdominal pain radiating to back, also had right testis pain with associated vomiting.   In ED had CT scan x 2 and scrotal US:  right epididymitis with right hydrocele and fluid in inguinal canal.   No difficulty urinating.   Denies dysuria, hematuria, fever, chills or rigors.   Since admission abdominal pain is better, still has tenderness in right testis.  No h/o kidney stone.     PAST MEDICAL & SURGICAL HISTORY:  Asthma  GERD (gastroesophageal reflux disease)  HTN (hypertension)  DM (diabetes mellitus)  S/P appendectomy      REVIEW OF SYSTEMS:   All other review of systems is negative unless indicated above.    MEDICATIONS  (STANDING):  budesonide 160 MICROgram(s)/formoterol 4.5 MICROgram(s) Inhaler 2 Puff(s) Inhalation two times a day  dextrose 5%. 1000 milliLiter(s) (50 mL/Hr) IV Continuous <Continuous>  dextrose 50% Injectable 12.5 Gram(s) IV Push once  dextrose 50% Injectable 25 Gram(s) IV Push once  dextrose 50% Injectable 25 Gram(s) IV Push once  influenza   Vaccine 0.5 milliLiter(s) IntraMuscular once  insulin lispro (HumaLOG) corrective regimen sliding scale   SubCutaneous three times a day before meals  insulin lispro (HumaLOG) corrective regimen sliding scale   SubCutaneous at bedtime  insulin lispro (HumaLOG) Pump 1 Each SubCutaneous Continuous Pump  lactated ringers. 1000 milliLiter(s) (100 mL/Hr) IV Continuous <Continuous>  losartan 100 milliGRAM(s) Oral daily  pantoprazole  Injectable 40 milliGRAM(s) IV Push every 12 hours    MEDICATIONS  (PRN):  acetaminophen   Tablet .. 650 milliGRAM(s) Oral every 4 hours PRN Mild Pain (1 - 3)  aluminum hydroxide/magnesium hydroxide/simethicone Suspension 30 milliLiter(s) Oral every 4 hours PRN Dyspepsia  dextrose 40% Gel 15 Gram(s) Oral once PRN Blood Glucose LESS THAN 70 milliGRAM(s)/deciliter  glucagon  Injectable 1 milliGRAM(s) IntraMuscular once PRN Glucose LESS THAN 70 milligrams/deciliter  HYDROmorphone  Injectable 0.5 milliGRAM(s) IV Push every 3 hours PRN Moderate Pain (4 - 6)  HYDROmorphone  Injectable 1 milliGRAM(s) IV Push every 3 hours PRN Severe Pain (7 - 10)  ondansetron Injectable 4 milliGRAM(s) IV Push every 6 hours PRN Nausea      Allergies    penicillins (Rash)    Intolerances        SOCIAL HISTORY:    FAMILY HISTORY:  Family history of coronary artery disease in mother      Vital Signs Last 24 Hrs  T(C): 36.4 (03 Sep 2020 14:52), Max: 37.2 (03 Sep 2020 14:52)  T(F): 97.6 (03 Sep 2020 14:52), Max: 99 (03 Sep 2020 14:52)  HR: 96 (03 Sep 2020 14:52) (84 - 100)  BP: 153/61 (03 Sep 2020 14:52) (148/71 - 159/69)  BP(mean): 91 (03 Sep 2020 07:14) (91 - 108)  RR: 18 (03 Sep 2020 14:52) (16 - 20)  SpO2: 98% (03 Sep 2020 14:52) (95% - 99%)    PHYSICAL EXAM:    Constitutional: No acute distress  HEENT: EOMI, Normal Hearing  Neck: Supple  Back: No costovertebral angle tenderness  Respiratory: Normal respiratory effort    Cardiovascular: Normal peripheral circulation   Abd: Soft, non distended, slight tenderness on deep palpation RLQ  : Normal urethra, penis, right testis and epididymis indurated, tender to touch, normal left testis  Extremities: No peripheral edema  Neurological: No focal deficits  Psychiatric: Normal mood, normal affect  Musculoskeletal: Moving all 4 extremities  Skin: No rashes    LABS:                        14.3   9.14  )-----------( 291      ( 03 Sep 2020 01:30 )             44.8     -03    142  |  108  |  17  ----------------------------<  171<H>  4.4   |  29  |  1.49<H>    Ca    9.4      03 Sep 2020 01:30    TPro  7.2  /  Alb  3.6  /  TBili  0.3  /  DBili  x   /  AST  24  /  ALT  47  /  AlkPhos  131<H>  09-03    PT/INR - ( 03 Sep 2020 07:08 )   PT: 13.1 sec;   INR: 1.13 ratio         PTT - ( 03 Sep 2020 07:08 )  PTT:33.1 sec  Urinalysis Basic - ( 03 Sep 2020 06:00 )    Color: Yellow / Appearance: Clear / S.010 / pH: x  Gluc: x / Ketone: Moderate  / Bili: Negative / Urobili: Negative mg/dL   Blood: x / Protein: 100 mg/dL / Nitrite: Negative   Leuk Esterase: Negative / RBC: 0-2 /HPF / WBC 0-2   Sq Epi: x / Non Sq Epi: Occasional / Bacteria: Occasional    < from: CT Abdomen and Pelvis w/ IV Cont (20 @ 08:02) >  EXAM:  CT ABDOMEN AND PELVIS IC                            PROCEDURE DATE:  2020          INTERPRETATION:  CLINICAL INFORMATION: Right lower quadrant and testicular pain    COMPARISON: 2020.    PROCEDURE:  CT of the Abdomen and Pelvis was performed with intravenous contrast.  Intravenous contrast: 90 ml Omnipaque 350. 10 ml discarded.  Oral contrast: None.  Sagittal and coronal reformats were performed.    FINDINGS:  LOWER CHEST: Nonspecific reticulation involving the dependent lungs.    LIVER: 4 mm sized low density segment 6, too small to characterize.  BILE DUCTS: Normal caliber.  GALLBLADDER: 4-5 mm sized gallbladder polyp or adherent stone.  SPLEEN: Within normal limits.  PANCREAS: Within normal limits.  ADRENALS: Subcentimeter sized bilateral adrenal nodules that cannot be characterized.  KIDNEYS/URETERS: Subcentimeter sized mildly complex left renal cysts.    BLADDER: Within normal limits.  REPRODUCTIVE ORGANS: Prostate within normal limits.    BOWEL: No bowel obstruction. Appendix is not visualized. No evidence of inflammation in the pericecal region.  PERITONEUM: No ascites.  VESSELS: Configuration of proximal celiac artery that may be related to median arcuate ligament.  RETROPERITONEUM/LYMPH NODES: No lymphadenopathy.  ABDOMINAL WALL: Small fluid collection right inguinal canal and mild right-sided hydrocele.  BONES: Left-sided facet arthrosis L5-S1 level.    IMPRESSION:  Mild right-sided hydrocele and fluid within the right inguinal canal.    Additional findings as above.    < end of copied text >    < from: US Testicles (20 @ 05:43) >  EXAM:  US SCROTUM AND CONTENTS                            PROCEDURE DATE:  2020          INTERPRETATION:  CLINICAL INFORMATION: Right scapular pain x1 day    COMPARISON: None available.    TECHNIQUE: Testicular ultrasound utilizing color and spectral Doppler.    FINDINGS:    RIGHT:  Right testis: 5.4 x 3.6 x 2.8 cm. Normal echogenicity and echotexture with no masses or areas of architectural distortion. Normal arterial and venous blood flow pattern. At the onset of the examination flow was not obtained from the right testicle, however, this is thought to have been due to technical factors. Subsequently normal arterial and venous waveforms were obtained.  Right epididymis: Mildly enlarged measuring 1.5 x 1.4 x 1.1 cm. Normal flow.  Right hydrocele: small. Small simple fluid collection in the right inguinal canal noted.  Right varicocele: None.    LEFT:  Left testis: 4.9 x 2.7 x 2.8 cm. Normal echogenicity and echotexture with no masses or areas of architectural distortion. Normal arterial and venous blood flow pattern.  Left epididymis: 1.1 x 1.1 x 1.0 cm. Normal flow.  Left hydrocele: Small  Left varicocele: None.    IMPRESSION:    Asymmetrically enlarged right epididymis with normal flow. Findings are nonspecific but may relate to epididymitis.    Small simple fluid collection in the right inguinal canal.    < end of copied text >

## 2020-09-03 NOTE — H&P ADULT - NSHPLABSRESULTS_GEN_ALL_CORE
14.3   9.14  )-----------( 291      ( 03 Sep 2020 01:30 )             44.8     03 Sep 2020 01:30    142    |  108    |  17     ----------------------------<  171    4.4     |  29     |  1.49     Ca    9.4        03 Sep 2020 01:30    TPro  7.2    /  Alb  3.6    /  TBili  0.3    /  DBili  x      /  AST  24     /  ALT  47     /  AlkPhos  131    03 Sep 2020 01:30    LIVER FUNCTIONS - ( 03 Sep 2020 01:30 )  Alb: 3.6 g/dL / Pro: 7.2 gm/dL / ALK PHOS: 131 U/L / ALT: 47 U/L / AST: 24 U/L / GGT: x           PT/INR - ( 03 Sep 2020 07:08 )   PT: 13.1 sec;   INR: 1.13 ratio      PTT - ( 03 Sep 2020 07:08 )  PTT:33.1 sec    POCT Blood Glucose.: 266 mg/dL (03 Sep 2020 11:39)    Urinalysis Basic - ( 03 Sep 2020 06:00 )  Color: Yellow / Appearance: Clear / S.010 / pH: x  Gluc: x / Ketone: Moderate  / Bili: Negative / Urobili: Negative mg/dL   Blood: x / Protein: 100 mg/dL / Nitrite: Negative   Leuk Esterase: Negative / RBC: 0-2 /HPF / WBC 0-2   Sq Epi: x / Non Sq Epi: Occasional / Bacteria: Occasional

## 2020-09-03 NOTE — ED PROVIDER NOTE - NS ED ROS FT
Constitutional: No fever or chills  Eyes: No visual changes  HEENT: No throat pain  CV: No chest pain  Resp: No SOB no cough  GI: + abd pain, nausea and vomiting  : No dysuria  MSK: No musculoskeletal pain  Skin: No rash  Neuro: No headache

## 2020-09-03 NOTE — ED PROVIDER NOTE - OBJECTIVE STATEMENT
59M hx htn DM presents to the ED with right flank pain radiates to the groin comes and goes severe associated with nausea and vomiting. no dysuria or hematuria. no fever. didn't take anything for pain.

## 2020-09-03 NOTE — PATIENT PROFILE ADULT - VISION (WITH CORRECTIVE LENSES IF THE PATIENT USUALLY WEARS THEM):
Partially impaired: cannot see medication labels or newsprint, but can see obstacles in path, and the surrounding layout; can count fingers at arm's length/glasses with him

## 2020-09-03 NOTE — H&P ADULT - NSHPPHYSICALEXAM_GEN_ALL_CORE
Vital Signs Last 24 Hrs  T(C): 36.6 (03 Sep 2020 07:14), Max: 37.1 (03 Sep 2020 00:49)  T(F): 97.9 (03 Sep 2020 07:14), Max: 98.8 (03 Sep 2020 00:49)  HR: 100 (03 Sep 2020 07:14) (84 - 100)  BP: 159/69 (03 Sep 2020 07:14) (148/71 - 159/69)  BP(mean): 91 (03 Sep 2020 07:14) (91 - 108)  RR: 16 (03 Sep 2020 07:14) (16 - 20)  SpO2: 98% (03 Sep 2020 07:14) (95% - 98%)

## 2020-09-03 NOTE — ED PROVIDER NOTE - CLINICAL SUMMARY MEDICAL DECISION MAKING FREE TEXT BOX
59M hx htn DM presents to the ED with right flank pain radiates to the groin comes and goes severe associated with nausea and vomiting. no dysuria or hematuria. no fever. didn't take anything for pain. exam with : Abd s/nd + right cvat negative moscoso/mcburney concern for stone. will obtain labs ua ct and reassess. Sergio Francisco M.D., Attending Physician

## 2020-09-03 NOTE — ED ADULT NURSE REASSESSMENT NOTE - NS ED NURSE REASSESS COMMENT FT1
pt bladder scanned, revealed 164ml. dr salomon made aware. straight cath for urine not needed at this time as per dr salomon. pt with right testicular pain., dr salomon made aware, ultrasound ordered for testicles

## 2020-09-03 NOTE — ED ADULT NURSE NOTE - CAS TRG GENERAL NORM CIRC DET
1. Continue bariatric vitamins as you are currently taking. Also add iron as your iron level is low. Recommend 36-45 mg every day - can get this over the counter  2. Goal continues to be 60-80 grams protein per day. Focus on choosing protein foods first at meals to remain full and maintain muscle mass while you continue to lose from body fat stores. 3. Regular physical activity is important if desire to continue weight loss efforts. Recommend regular cardiac activity and strength training 2-3 days per week. 4.  Water goal is 64 oz per day and make sure no liquids 30 minutes before meals and no liquids 30 minutes after meals  5.   Take 20-30 minutes to eat meals and chew all foods well for best tolerance  Repeat iron levels in three months (early November) Strong peripheral pulses

## 2020-09-04 ENCOUNTER — TRANSCRIPTION ENCOUNTER (OUTPATIENT)
Age: 60
End: 2020-09-04

## 2020-09-04 VITALS
OXYGEN SATURATION: 97 % | TEMPERATURE: 98 F | DIASTOLIC BLOOD PRESSURE: 70 MMHG | HEART RATE: 79 BPM | RESPIRATION RATE: 18 BRPM | SYSTOLIC BLOOD PRESSURE: 128 MMHG

## 2020-09-04 LAB
A1C WITH ESTIMATED AVERAGE GLUCOSE RESULT: 8.3 % — HIGH (ref 4–5.6)
ANION GAP SERPL CALC-SCNC: 5 MMOL/L — SIGNIFICANT CHANGE UP (ref 5–17)
BUN SERPL-MCNC: 17 MG/DL — SIGNIFICANT CHANGE UP (ref 7–23)
CALCIUM SERPL-MCNC: 8.5 MG/DL — SIGNIFICANT CHANGE UP (ref 8.5–10.1)
CHLORIDE SERPL-SCNC: 110 MMOL/L — HIGH (ref 96–108)
CO2 SERPL-SCNC: 27 MMOL/L — SIGNIFICANT CHANGE UP (ref 22–31)
CREAT SERPL-MCNC: 1.35 MG/DL — HIGH (ref 0.5–1.3)
CULTURE RESULTS: SIGNIFICANT CHANGE UP
ESTIMATED AVERAGE GLUCOSE: 192 MG/DL — HIGH (ref 68–114)
GLUCOSE SERPL-MCNC: 156 MG/DL — HIGH (ref 70–99)
HCT VFR BLD CALC: 41.6 % — SIGNIFICANT CHANGE UP (ref 39–50)
HCV AB S/CO SERPL IA: 0.12 S/CO — SIGNIFICANT CHANGE UP (ref 0–0.99)
HCV AB SERPL-IMP: SIGNIFICANT CHANGE UP
HGB BLD-MCNC: 13.1 G/DL — SIGNIFICANT CHANGE UP (ref 13–17)
MCHC RBC-ENTMCNC: 26.1 PG — LOW (ref 27–34)
MCHC RBC-ENTMCNC: 31.5 GM/DL — LOW (ref 32–36)
MCV RBC AUTO: 82.9 FL — SIGNIFICANT CHANGE UP (ref 80–100)
PLATELET # BLD AUTO: 263 K/UL — SIGNIFICANT CHANGE UP (ref 150–400)
POTASSIUM SERPL-MCNC: 3.8 MMOL/L — SIGNIFICANT CHANGE UP (ref 3.5–5.3)
POTASSIUM SERPL-SCNC: 3.8 MMOL/L — SIGNIFICANT CHANGE UP (ref 3.5–5.3)
RBC # BLD: 5.02 M/UL — SIGNIFICANT CHANGE UP (ref 4.2–5.8)
RBC # FLD: 13.9 % — SIGNIFICANT CHANGE UP (ref 10.3–14.5)
SODIUM SERPL-SCNC: 142 MMOL/L — SIGNIFICANT CHANGE UP (ref 135–145)
SPECIMEN SOURCE: SIGNIFICANT CHANGE UP
WBC # BLD: 8.31 K/UL — SIGNIFICANT CHANGE UP (ref 3.8–10.5)
WBC # FLD AUTO: 8.31 K/UL — SIGNIFICANT CHANGE UP (ref 3.8–10.5)

## 2020-09-04 PROCEDURE — 99239 HOSP IP/OBS DSCHRG MGMT >30: CPT

## 2020-09-04 RX ORDER — PANTOPRAZOLE SODIUM 20 MG/1
40 TABLET, DELAYED RELEASE ORAL
Refills: 0 | Status: DISCONTINUED | OUTPATIENT
Start: 2020-09-04 | End: 2020-09-04

## 2020-09-04 RX ORDER — OXYCODONE HYDROCHLORIDE 5 MG/1
5 TABLET ORAL EVERY 6 HOURS
Refills: 0 | Status: DISCONTINUED | OUTPATIENT
Start: 2020-09-04 | End: 2020-09-04

## 2020-09-04 RX ORDER — ACETAMINOPHEN 500 MG
2 TABLET ORAL
Qty: 0 | Refills: 0 | DISCHARGE
Start: 2020-09-04

## 2020-09-04 RX ORDER — CEFTRIAXONE 500 MG/1
1000 INJECTION, POWDER, FOR SOLUTION INTRAMUSCULAR; INTRAVENOUS EVERY 24 HOURS
Refills: 0 | Status: DISCONTINUED | OUTPATIENT
Start: 2020-09-04 | End: 2020-09-04

## 2020-09-04 RX ORDER — PANTOPRAZOLE SODIUM 20 MG/1
1 TABLET, DELAYED RELEASE ORAL
Qty: 10 | Refills: 0
Start: 2020-09-04 | End: 2020-09-13

## 2020-09-04 RX ORDER — ASPIRIN/CALCIUM CARB/MAGNESIUM 324 MG
81 TABLET ORAL DAILY
Refills: 0 | Status: DISCONTINUED | OUTPATIENT
Start: 2020-09-04 | End: 2020-09-04

## 2020-09-04 RX ORDER — OXYCODONE HYDROCHLORIDE 5 MG/1
1 TABLET ORAL
Qty: 20 | Refills: 0
Start: 2020-09-04 | End: 2020-09-08

## 2020-09-04 RX ORDER — EZETIMIBE 10 MG/1
1 TABLET ORAL
Qty: 0 | Refills: 0 | DISCHARGE

## 2020-09-04 RX ADMIN — HYDROMORPHONE HYDROCHLORIDE 1 MILLIGRAM(S): 2 INJECTION INTRAMUSCULAR; INTRAVENOUS; SUBCUTANEOUS at 09:05

## 2020-09-04 RX ADMIN — ENOXAPARIN SODIUM 40 MILLIGRAM(S): 100 INJECTION SUBCUTANEOUS at 10:35

## 2020-09-04 RX ADMIN — SODIUM CHLORIDE 100 MILLILITER(S): 9 INJECTION, SOLUTION INTRAVENOUS at 03:23

## 2020-09-04 RX ADMIN — LOSARTAN POTASSIUM 100 MILLIGRAM(S): 100 TABLET, FILM COATED ORAL at 10:35

## 2020-09-04 RX ADMIN — HYDROMORPHONE HYDROCHLORIDE 1 MILLIGRAM(S): 2 INJECTION INTRAMUSCULAR; INTRAVENOUS; SUBCUTANEOUS at 07:50

## 2020-09-04 NOTE — DISCHARGE NOTE PROVIDER - CARE PROVIDERS DIRECT ADDRESSES
[email or web address]

## 2020-09-04 NOTE — DISCHARGE NOTE NURSING/CASE MANAGEMENT/SOCIAL WORK - PATIENT PORTAL LINK FT
You can access the FollowMyHealth Patient Portal offered by [facility] by registering at the following website: [email or web address]. By joining [email or web address]’s FollowMyHealth portal, you will also be able to view your health information using other applications (apps) compatible with our system.

## 2020-09-04 NOTE — CONSULT NOTE ADULT - ASSESSMENT
60 y/o Male with h/o diabetes mellitus on insulin pump, HTN, hyperlipidemia, asthma was admitted on 9/3 for right groin pain x one day, associated with vomiting. Denies fever. No diarrhea. Workup in ER showed painful and swollen testicle and given levofloxacin.    1. Low grade fever. Right epididimitis. Renal failure ?acute vs chronic. Allergy to PCN. 58 y/o Male with h/o obesity, sleep apnea, diabetes mellitus on insulin pump, HTN, hyperlipidemia, asthma was admitted on 9/3 for right groin pain x one day, associated with vomiting. Denies fever. No diarrhea. Workup in ER showed painful and swollen testicle and given levofloxacin.    1. Low grade fever. Right epididimitis. Renal failure ?acute vs chronic. Allergy to PCN.   -no clinical evidence of sepsis  -agree with levofloxacin 500 mg IV qd for now  -may change to levofloxacin 500 mg PO qd when ready for d/c home  -old chart reviewed to assess prior cultures  -monitor temps  -f/u CBC  -supportive care  2. Other issues:   -care per medicine

## 2020-09-04 NOTE — DISCHARGE NOTE PROVIDER - HOSPITAL COURSE
CC: RT groin pain. Vomiting (04 Sep 2020 10:03)    HPI: 59 y.o. male with PMHx of IDDM on insulin pump, HTN, hyperlipidemia, asthma presented with sudden onset of RT groin pain started last night, associated with vomiting. Denies fever. No diarrhea. Found to have RT-sided epididymitis. (03 Sep 2020 11:30)    INTERVAL HPI/OVERNIGHT EVENTS: N/V resolved, pain is controlled, kidney fx improved, Other ROS reviewed and neg     Vital Signs Last 24 Hrs    T(C): 36.7 (04 Sep 2020 07:29), Max: 37.2 (03 Sep 2020 14:52)    T(F): 98.1 (04 Sep 2020 07:29), Max: 99 (03 Sep 2020 14:52)    HR: 79 (04 Sep 2020 07:29) (78 - 98)    BP: 128/70 (04 Sep 2020 07:29) (128/70 - 155/61)    RR: 18 (04 Sep 2020 07:29) (18 - 18)    SpO2: 97% (04 Sep 2020 07:29) (97% - 99%)                    13.1     8.31  )-----------( 263      ( 04 Sep 2020 08:25 )               41.6     04 Sep 2020 08:25    142    |  110    |  17       ----------------------------<  156      3.8     |  27     |  1.35     Ca    8.5        04 Sep 2020 08:25    TPro  7.2    /  Alb  3.6    /  TBili  0.3    /  DBili  x      /  AST  24     /  ALT  47     /  AlkPhos  131    03 Sep 2020 01:30    PT/INR - ( 03 Sep 2020 07:08 )   PT: 13.1 sec;   INR: 1.13 ratio      PTT - ( 03 Sep 2020 07:08 )  PTT:33.1 sec    CAPILLARY BLOOD GLUCOSE    POCT Blood Glucose.: 123 mg/dL (04 Sep 2020 11:51)    POCT Blood Glucose.: 140 mg/dL (04 Sep 2020 07:55)    POCT Blood Glucose.: 207 mg/dL (03 Sep 2020 21:17)    POCT Blood Glucose.: 312 mg/dL (03 Sep 2020 17:52)    LIVER FUNCTIONS - ( 03 Sep 2020 01:30 )    Alb: 3.6 g/dL / Pro: 7.2 gm/dL / ALK PHOS: 131 U/L / ALT: 47 U/L / AST: 24 U/L / GGT: x           Urinalysis Basic - ( 03 Sep 2020 06:00 )    Color: Yellow / Appearance: Clear / S.010 / pH: x    Gluc: x / Ketone: Moderate  / Bili: Negative / Urobili: Negative mg/dL     Blood: x / Protein: 100 mg/dL / Nitrite: Negative     Leuk Esterase: Negative / RBC: 0-2 /HPF / WBC 0-2     Sq Epi: x / Non Sq Epi: Occasional / Bacteria: Occasional    MEDICATIONS reviewed    RADIOLOGY/LABS reviewed    General: Well developed; well nourished; in no acute distress    Eyes: PERRLA, EOMI; conjunctiva and sclera clear    Head: Normocephalic; atraumatic    ENMT: No nasal discharge; airway clear    Neck: Supple; non tender; no masses    Respiratory: No wheezes, rales or rhonchi    Cardiovascular: Regular rate and rhythm. S1 and S2     Gastrointestinal: Soft non-tender non-distended; Normal bowel sounds    Genitourinary: No costovertebral angle tenderness, + right testicular edema and tenderness with enlargement    Extremities: Normal range of motion, No clubbing, cyanosis or edema    Neuro: no gross deficits    A/P:    1. Acute right epididymitis - change to po levaquin to complete 10 days course, f/u with urology, unable to use NSAIDs due to DANN - tylenol and oxycodone as needed    2. IDDM - cont insulin pump and make sure well hydrated    3. HLD - zetia    4. HTN - losartan    5. Asthma - dulera    6. DANN in the settings of dehydration due to #1 - resolving, maintain good oral hydration    Medically stable for DC if tolerates po diet

## 2020-09-04 NOTE — CONSULT NOTE ADULT - SUBJECTIVE AND OBJECTIVE BOX
Patient is a 59y old  Male who presents with a chief complaint of RT groin pain. Vomiting    HPI:  60 y/o Male with h/o diabetes mellitus on insulin pump, HTN, hyperlipidemia, asthma was admitted on 9/3 for right groin pain x one day, associated with vomiting. Denies fever. No diarrhea. Workup in ER showed painful and swollen testicle and given levofloxacin.    PMH: as above  PSH: as above  Meds: per reconciliation sheet, noted below  MEDICATIONS  (STANDING):  budesonide 160 MICROgram(s)/formoterol 4.5 MICROgram(s) Inhaler 2 Puff(s) Inhalation two times a day  dextrose 5%. 1000 milliLiter(s) (50 mL/Hr) IV Continuous <Continuous>  dextrose 50% Injectable 12.5 Gram(s) IV Push once  dextrose 50% Injectable 25 Gram(s) IV Push once  dextrose 50% Injectable 25 Gram(s) IV Push once  enoxaparin Injectable 40 milliGRAM(s) SubCutaneous daily  influenza   Vaccine 0.5 milliLiter(s) IntraMuscular once  insulin lispro (HumaLOG) corrective regimen sliding scale   SubCutaneous three times a day before meals  insulin lispro (HumaLOG) corrective regimen sliding scale   SubCutaneous at bedtime  insulin lispro (HumaLOG) Pump 1 Each SubCutaneous Continuous Pump  lactated ringers. 1000 milliLiter(s) (100 mL/Hr) IV Continuous <Continuous>  levoFLOXacin IVPB 500 milliGRAM(s) IV Intermittent every 24 hours  losartan 100 milliGRAM(s) Oral daily  pantoprazole  Injectable 40 milliGRAM(s) IV Push every 12 hours    MEDICATIONS  (PRN):  acetaminophen   Tablet .. 650 milliGRAM(s) Oral every 4 hours PRN Mild Pain (1 - 3)  aluminum hydroxide/magnesium hydroxide/simethicone Suspension 30 milliLiter(s) Oral every 4 hours PRN Dyspepsia  dextrose 40% Gel 15 Gram(s) Oral once PRN Blood Glucose LESS THAN 70 milliGRAM(s)/deciliter  glucagon  Injectable 1 milliGRAM(s) IntraMuscular once PRN Glucose LESS THAN 70 milligrams/deciliter  HYDROmorphone  Injectable 0.5 milliGRAM(s) IV Push every 3 hours PRN Moderate Pain (4 - 6)  HYDROmorphone  Injectable 1 milliGRAM(s) IV Push every 3 hours PRN Severe Pain (7 - 10)  ondansetron Injectable 4 milliGRAM(s) IV Push every 6 hours PRN Nausea    Allergies    penicillins (Rash)    Intolerances      Social: no smoking, no alcohol, no illegal drugs; no recent travel, no exposure to TB  FAMILY HISTORY:  Family history of coronary artery disease in mother    no history of premature cardiovascular disease in first degree relatives    ROS: the patient denies fever, no chills, no HA, no seizures, no dizziness, no sore throat, no nasal congestion, no blurry vision, no CP, no palpitations, no SOB, no cough, no abdominal pain, no diarrhea, no N/V, no dysuria, no leg pain, no claudication, has testicular pain and rash, no joint aches, no rectal pain or bleeding, no night sweats  All other systems reviewed and are negative    Vital Signs Last 24 Hrs  T(C): 36.7 (04 Sep 2020 07:29), Max: 37.2 (03 Sep 2020 14:52)  T(F): 98.1 (04 Sep 2020 07:29), Max: 99 (03 Sep 2020 14:52)  HR: 79 (04 Sep 2020 07:29) (78 - 98)  BP: 128/70 (04 Sep 2020 07:29) (128/70 - 155/71)  BP(mean): --  RR: 18 (04 Sep 2020 07:29) (18 - 18)  SpO2: 97% (04 Sep 2020 07:29) (97% - 99%)  Daily     Daily     PE:    Constitutional:  No acute distress  HEENT: NC/AT, EOMI, PERRLA, conjunctivae clear; ears and nose atraumatic; pharynx benign  Neck: supple; thyroid not palpable  Back: no tenderness  Respiratory: respiratory effort normal; clear to auscultation  Cardiovascular: S1S2 regular, no murmurs  Abdomen: soft, not tender, not distended, positive BS; no liver or spleen organomegaly  Genitourinary: no suprapubic tenderness; testicular tenderness  Lymphatic: no LN palpable  Musculoskeletal: no muscle tenderness, no joint swelling or tenderness  Extremities: no pedal edema  Neurological/ Psychiatric: AxOx3, judgement and insight normal; moving all extremities  Skin: no rashes; no palpable lesions    Labs: all available labs reviewed                        13.1   8.31  )-----------( 263      ( 04 Sep 2020 08:25 )             41.6     09-04    142  |  110<H>  |  17  ----------------------------<  156<H>  3.8   |  27  |  1.35<H>    Ca    8.5      04 Sep 2020 08:25    TPro  7.2  /  Alb  3.6  /  TBili  0.3  /  DBili  x   /  AST  24  /  ALT  47  /  AlkPhos  131<H>       LIVER FUNCTIONS - ( 03 Sep 2020 01:30 )  Alb: 3.6 g/dL / Pro: 7.2 gm/dL / ALK PHOS: 131 U/L / ALT: 47 U/L / AST: 24 U/L / GGT: x           Urinalysis Basic - ( 03 Sep 2020 06:00 )    Color: Yellow / Appearance: Clear / S.010 / pH: x  Gluc: x / Ketone: Moderate  / Bili: Negative / Urobili: Negative mg/dL   Blood: x / Protein: 100 mg/dL / Nitrite: Negative   Leuk Esterase: Negative / RBC: 0-2 /HPF / WBC 0-2   Sq Epi: x / Non Sq Epi: Occasional / Bacteria: Occasional        COVID-19 PCR: NotDetec (20 @ 09:17)    Radiology: all available radiological tests reviewed    < from: CT Abdomen and Pelvis w/ IV Cont (20 @ 08:02) >  Mild right-sided hydrocele and fluid within the right inguinal canal.    < from: US Testicles (20 @ 05:43) >  Asymmetrically enlarged right epididymis with normal flow. Findings are nonspecific but may relate to epididymitis.    < end of copied text >      < end of copied text >      Advanced directives addressed: full resuscitation Patient is a 59y old  Male who presents with a chief complaint of RT groin pain. Vomiting    HPI:  58 y/o Male with h/o obesity, sleep apnea, diabetes mellitus on insulin pump, HTN, hyperlipidemia, asthma was admitted on 9/3 for right groin pain x one day, associated with vomiting. Denies fever. No diarrhea. Workup in ER showed painful and swollen testicle and given levofloxacin.    PMH: as above  PSH: as above  Meds: per reconciliation sheet, noted below  MEDICATIONS  (STANDING):  budesonide 160 MICROgram(s)/formoterol 4.5 MICROgram(s) Inhaler 2 Puff(s) Inhalation two times a day  dextrose 5%. 1000 milliLiter(s) (50 mL/Hr) IV Continuous <Continuous>  dextrose 50% Injectable 12.5 Gram(s) IV Push once  dextrose 50% Injectable 25 Gram(s) IV Push once  dextrose 50% Injectable 25 Gram(s) IV Push once  enoxaparin Injectable 40 milliGRAM(s) SubCutaneous daily  influenza   Vaccine 0.5 milliLiter(s) IntraMuscular once  insulin lispro (HumaLOG) corrective regimen sliding scale   SubCutaneous three times a day before meals  insulin lispro (HumaLOG) corrective regimen sliding scale   SubCutaneous at bedtime  insulin lispro (HumaLOG) Pump 1 Each SubCutaneous Continuous Pump  lactated ringers. 1000 milliLiter(s) (100 mL/Hr) IV Continuous <Continuous>  levoFLOXacin IVPB 500 milliGRAM(s) IV Intermittent every 24 hours  losartan 100 milliGRAM(s) Oral daily  pantoprazole  Injectable 40 milliGRAM(s) IV Push every 12 hours    MEDICATIONS  (PRN):  acetaminophen   Tablet .. 650 milliGRAM(s) Oral every 4 hours PRN Mild Pain (1 - 3)  aluminum hydroxide/magnesium hydroxide/simethicone Suspension 30 milliLiter(s) Oral every 4 hours PRN Dyspepsia  dextrose 40% Gel 15 Gram(s) Oral once PRN Blood Glucose LESS THAN 70 milliGRAM(s)/deciliter  glucagon  Injectable 1 milliGRAM(s) IntraMuscular once PRN Glucose LESS THAN 70 milligrams/deciliter  HYDROmorphone  Injectable 0.5 milliGRAM(s) IV Push every 3 hours PRN Moderate Pain (4 - 6)  HYDROmorphone  Injectable 1 milliGRAM(s) IV Push every 3 hours PRN Severe Pain (7 - 10)  ondansetron Injectable 4 milliGRAM(s) IV Push every 6 hours PRN Nausea    Allergies    penicillins (Rash)    Intolerances      Social: no smoking, no alcohol, no illegal drugs; no recent travel, no exposure to TB  FAMILY HISTORY:  Family history of coronary artery disease in mother    no history of premature cardiovascular disease in first degree relatives    ROS: the patient denies fever, no chills, no HA, no seizures, no dizziness, no sore throat, no nasal congestion, no blurry vision, no CP, no palpitations, no SOB, no cough, no abdominal pain, no diarrhea, no N/V, no dysuria, no leg pain, no claudication, has testicular pain and rash, no joint aches, no rectal pain or bleeding, no night sweats  All other systems reviewed and are negative    Vital Signs Last 24 Hrs  T(C): 36.7 (04 Sep 2020 07:29), Max: 37.2 (03 Sep 2020 14:52)  T(F): 98.1 (04 Sep 2020 07:29), Max: 99 (03 Sep 2020 14:52)  HR: 79 (04 Sep 2020 07:29) (78 - 98)  BP: 128/70 (04 Sep 2020 07:29) (128/70 - 155/71)  BP(mean): --  RR: 18 (04 Sep 2020 07:29) (18 - 18)  SpO2: 97% (04 Sep 2020 07:29) (97% - 99%)  Daily     Daily     PE:    Constitutional:  No acute distress  HEENT: NC/AT, EOMI, PERRLA, conjunctivae clear; ears and nose atraumatic; pharynx benign  Neck: supple; thyroid not palpable  Back: no tenderness  Respiratory: respiratory effort normal; clear to auscultation  Cardiovascular: S1S2 regular, no murmurs  Abdomen: soft, not tender, not distended, positive BS; no liver or spleen organomegaly  Genitourinary: no suprapubic tenderness; testicular tenderness  Lymphatic: no LN palpable  Musculoskeletal: no muscle tenderness, no joint swelling or tenderness  Extremities: no pedal edema  Neurological/ Psychiatric: AxOx3, judgement and insight normal; moving all extremities  Skin: no rashes; no palpable lesions    Labs: all available labs reviewed                        13.1   8.31  )-----------( 263      ( 04 Sep 2020 08:25 )             41.6         142  |  110<H>  |  17  ----------------------------<  156<H>  3.8   |  27  |  1.35<H>    Ca    8.5      04 Sep 2020 08:25    TPro  7.2  /  Alb  3.6  /  TBili  0.3  /  DBili  x   /  AST  24  /  ALT  47  /  AlkPhos  131<H>  0903     LIVER FUNCTIONS - ( 03 Sep 2020 01:30 )  Alb: 3.6 g/dL / Pro: 7.2 gm/dL / ALK PHOS: 131 U/L / ALT: 47 U/L / AST: 24 U/L / GGT: x           Urinalysis Basic - ( 03 Sep 2020 06:00 )    Color: Yellow / Appearance: Clear / S.010 / pH: x  Gluc: x / Ketone: Moderate  / Bili: Negative / Urobili: Negative mg/dL   Blood: x / Protein: 100 mg/dL / Nitrite: Negative   Leuk Esterase: Negative / RBC: 0-2 /HPF / WBC 0-2   Sq Epi: x / Non Sq Epi: Occasional / Bacteria: Occasional        COVID-19 PCR: NotDetec (20 @ 09:17)    Radiology: all available radiological tests reviewed    < from: CT Abdomen and Pelvis w/ IV Cont (20 @ 08:02) >  Mild right-sided hydrocele and fluid within the right inguinal canal.    < from: US Testicles (20 @ 05:43) >  Asymmetrically enlarged right epididymis with normal flow. Findings are nonspecific but may relate to epididymitis.    < end of copied text >      < end of copied text >      Advanced directives addressed: full resuscitation

## 2020-09-04 NOTE — DISCHARGE NOTE PROVIDER - NSDCMRMEDTOKEN_GEN_ALL_CORE_FT
acetaminophen 325 mg oral tablet: 2 tab(s) orally every 4 hours, As needed, Mild Pain (1 - 3)  aspirin 81 mg oral tablet: 1 tab(s) orally once a day  CoQ10: 100 milligram(s) orally once a day  Dulera 200 mcg-5 mcg/inh inhalation aerosol: 2 puff(s) inhaled 2 times a day  famotidine 40 mg oral tablet: 1 tab(s) orally once a day (at bedtime)  HumaLOG Insulin Pump: Basal rate: 1.1 units/hr  BG: Target 100    Carb ratio:  ISF:     Patient unable to obtain other settings via pump  levoFLOXacin 500 mg oral tablet: 1 tab(s) orally once a day   losartan 100 mg oral tablet: 1 tab(s) orally once a day  oxyCODONE 5 mg oral tablet: 1 tab(s) orally every 6 hours, As needed, Moderate Pain (4 - 6) MDD:4 tabs  pantoprazole 40 mg oral delayed release tablet: 1 tab(s) orally once a day (before a meal)  Vitamin B6 100 mg oral tablet: 1 tab(s) orally once a day  Zetia 10 mg oral tablet: 1 tab(s) orally once a day

## 2020-09-08 ENCOUNTER — OUTPATIENT (OUTPATIENT)
Dept: OUTPATIENT SERVICES | Facility: HOSPITAL | Age: 60
LOS: 1 days | End: 2020-09-08

## 2020-09-08 ENCOUNTER — APPOINTMENT (OUTPATIENT)
Dept: ULTRASOUND IMAGING | Facility: CLINIC | Age: 60
End: 2020-09-08
Payer: COMMERCIAL

## 2020-09-08 DIAGNOSIS — E78.5 HYPERLIPIDEMIA, UNSPECIFIED: ICD-10-CM

## 2020-09-08 DIAGNOSIS — Z00.8 ENCOUNTER FOR OTHER GENERAL EXAMINATION: ICD-10-CM

## 2020-09-08 DIAGNOSIS — N45.1 EPIDIDYMITIS: ICD-10-CM

## 2020-09-08 DIAGNOSIS — Z88.0 ALLERGY STATUS TO PENICILLIN: ICD-10-CM

## 2020-09-08 DIAGNOSIS — Z90.49 ACQUIRED ABSENCE OF OTHER SPECIFIED PARTS OF DIGESTIVE TRACT: Chronic | ICD-10-CM

## 2020-09-08 DIAGNOSIS — I10 ESSENTIAL (PRIMARY) HYPERTENSION: ICD-10-CM

## 2020-09-08 DIAGNOSIS — Z96.41 PRESENCE OF INSULIN PUMP (EXTERNAL) (INTERNAL): ICD-10-CM

## 2020-09-08 DIAGNOSIS — Z11.59 ENCOUNTER FOR SCREENING FOR OTHER VIRAL DISEASES: ICD-10-CM

## 2020-09-08 DIAGNOSIS — Z79.4 LONG TERM (CURRENT) USE OF INSULIN: ICD-10-CM

## 2020-09-08 DIAGNOSIS — J45.909 UNSPECIFIED ASTHMA, UNCOMPLICATED: ICD-10-CM

## 2020-09-08 DIAGNOSIS — N43.3 HYDROCELE, UNSPECIFIED: ICD-10-CM

## 2020-09-08 DIAGNOSIS — E11.9 TYPE 2 DIABETES MELLITUS WITHOUT COMPLICATIONS: ICD-10-CM

## 2020-09-09 ENCOUNTER — INPATIENT (INPATIENT)
Facility: HOSPITAL | Age: 60
LOS: 3 days | Discharge: HOME | DRG: 711 | End: 2020-09-13
Attending: INTERNAL MEDICINE | Admitting: INTERNAL MEDICINE
Payer: COMMERCIAL

## 2020-09-09 ENCOUNTER — APPOINTMENT (OUTPATIENT)
Dept: UROLOGY | Facility: CLINIC | Age: 60
End: 2020-09-09
Payer: COMMERCIAL

## 2020-09-09 VITALS
HEIGHT: 70 IN | SYSTOLIC BLOOD PRESSURE: 155 MMHG | OXYGEN SATURATION: 97 % | DIASTOLIC BLOOD PRESSURE: 84 MMHG | HEART RATE: 95 BPM | TEMPERATURE: 98.8 F | WEIGHT: 250 LBS | BODY MASS INDEX: 35.79 KG/M2

## 2020-09-09 VITALS — HEIGHT: 69 IN | WEIGHT: 250 LBS

## 2020-09-09 DIAGNOSIS — Z86.69 PERSONAL HISTORY OF OTHER DISEASES OF THE NERVOUS SYSTEM AND SENSE ORGANS: ICD-10-CM

## 2020-09-09 DIAGNOSIS — N50.1 VASCULAR DISORDERS OF MALE GENITAL ORGANS: ICD-10-CM

## 2020-09-09 DIAGNOSIS — Z86.79 PERSONAL HISTORY OF OTHER DISEASES OF THE CIRCULATORY SYSTEM: ICD-10-CM

## 2020-09-09 DIAGNOSIS — Z90.49 ACQUIRED ABSENCE OF OTHER SPECIFIED PARTS OF DIGESTIVE TRACT: Chronic | ICD-10-CM

## 2020-09-09 DIAGNOSIS — N50.811 RIGHT TESTICULAR PAIN: ICD-10-CM

## 2020-09-09 DIAGNOSIS — Z87.19 PERSONAL HISTORY OF OTHER DISEASES OF THE DIGESTIVE SYSTEM: ICD-10-CM

## 2020-09-09 LAB
ALBUMIN SERPL ELPH-MCNC: 3.1 G/DL — LOW (ref 3.3–5)
ALP SERPL-CCNC: 173 U/L — HIGH (ref 40–120)
ALT FLD-CCNC: 116 U/L — HIGH (ref 12–78)
ANION GAP SERPL CALC-SCNC: 4 MMOL/L — LOW (ref 5–17)
APPEARANCE UR: CLEAR — SIGNIFICANT CHANGE UP
APTT BLD: 33.1 SEC — SIGNIFICANT CHANGE UP (ref 27.5–35.5)
AST SERPL-CCNC: 69 U/L — HIGH (ref 15–37)
BASOPHILS # BLD AUTO: 0.02 K/UL — SIGNIFICANT CHANGE UP (ref 0–0.2)
BASOPHILS NFR BLD AUTO: 0.3 % — SIGNIFICANT CHANGE UP (ref 0–2)
BILIRUB SERPL-MCNC: 0.4 MG/DL — SIGNIFICANT CHANGE UP (ref 0.2–1.2)
BILIRUB UR-MCNC: NEGATIVE — SIGNIFICANT CHANGE UP
BUN SERPL-MCNC: 14 MG/DL — SIGNIFICANT CHANGE UP (ref 7–23)
CALCIUM SERPL-MCNC: 9.1 MG/DL — SIGNIFICANT CHANGE UP (ref 8.5–10.1)
CHLORIDE SERPL-SCNC: 110 MMOL/L — HIGH (ref 96–108)
CO2 SERPL-SCNC: 27 MMOL/L — SIGNIFICANT CHANGE UP (ref 22–31)
COLOR SPEC: YELLOW — SIGNIFICANT CHANGE UP
CREAT SERPL-MCNC: 1.39 MG/DL — HIGH (ref 0.5–1.3)
DIFF PNL FLD: ABNORMAL
EOSINOPHIL # BLD AUTO: 0.35 K/UL — SIGNIFICANT CHANGE UP (ref 0–0.5)
EOSINOPHIL NFR BLD AUTO: 4.4 % — SIGNIFICANT CHANGE UP (ref 0–6)
GLUCOSE SERPL-MCNC: 125 MG/DL — HIGH (ref 70–99)
GLUCOSE UR QL: 50 MG/DL
HCT VFR BLD CALC: 41.6 % — SIGNIFICANT CHANGE UP (ref 39–50)
HGB BLD-MCNC: 13.4 G/DL — SIGNIFICANT CHANGE UP (ref 13–17)
IMM GRANULOCYTES NFR BLD AUTO: 0.3 % — SIGNIFICANT CHANGE UP (ref 0–1.5)
INR BLD: 1.17 RATIO — HIGH (ref 0.88–1.16)
KETONES UR-MCNC: NEGATIVE — SIGNIFICANT CHANGE UP
LEUKOCYTE ESTERASE UR-ACNC: NEGATIVE — SIGNIFICANT CHANGE UP
LYMPHOCYTES # BLD AUTO: 1.82 K/UL — SIGNIFICANT CHANGE UP (ref 1–3.3)
LYMPHOCYTES # BLD AUTO: 23.1 % — SIGNIFICANT CHANGE UP (ref 13–44)
MCHC RBC-ENTMCNC: 26.7 PG — LOW (ref 27–34)
MCHC RBC-ENTMCNC: 32.2 GM/DL — SIGNIFICANT CHANGE UP (ref 32–36)
MCV RBC AUTO: 82.9 FL — SIGNIFICANT CHANGE UP (ref 80–100)
MONOCYTES # BLD AUTO: 0.82 K/UL — SIGNIFICANT CHANGE UP (ref 0–0.9)
MONOCYTES NFR BLD AUTO: 10.4 % — SIGNIFICANT CHANGE UP (ref 2–14)
NEUTROPHILS # BLD AUTO: 4.84 K/UL — SIGNIFICANT CHANGE UP (ref 1.8–7.4)
NEUTROPHILS NFR BLD AUTO: 61.5 % — SIGNIFICANT CHANGE UP (ref 43–77)
NITRITE UR-MCNC: NEGATIVE — SIGNIFICANT CHANGE UP
PH UR: 5 — SIGNIFICANT CHANGE UP (ref 5–8)
PLATELET # BLD AUTO: 303 K/UL — SIGNIFICANT CHANGE UP (ref 150–400)
POTASSIUM SERPL-MCNC: 4 MMOL/L — SIGNIFICANT CHANGE UP (ref 3.5–5.3)
POTASSIUM SERPL-SCNC: 4 MMOL/L — SIGNIFICANT CHANGE UP (ref 3.5–5.3)
PROT SERPL-MCNC: 7.2 GM/DL — SIGNIFICANT CHANGE UP (ref 6–8.3)
PROT UR-MCNC: 30 MG/DL
PROTHROM AB SERPL-ACNC: 13.6 SEC — SIGNIFICANT CHANGE UP (ref 10.6–13.6)
RBC # BLD: 5.02 M/UL — SIGNIFICANT CHANGE UP (ref 4.2–5.8)
RBC # FLD: 13.8 % — SIGNIFICANT CHANGE UP (ref 10.3–14.5)
SARS-COV-2 RNA SPEC QL NAA+PROBE: SIGNIFICANT CHANGE UP
SODIUM SERPL-SCNC: 141 MMOL/L — SIGNIFICANT CHANGE UP (ref 135–145)
SP GR SPEC: 1.01 — SIGNIFICANT CHANGE UP (ref 1.01–1.02)
UROBILINOGEN FLD QL: NEGATIVE MG/DL — SIGNIFICANT CHANGE UP
WBC # BLD: 7.87 K/UL — SIGNIFICANT CHANGE UP (ref 3.8–10.5)
WBC # FLD AUTO: 7.87 K/UL — SIGNIFICANT CHANGE UP (ref 3.8–10.5)

## 2020-09-09 PROCEDURE — 88305 TISSUE EXAM BY PATHOLOGIST: CPT

## 2020-09-09 PROCEDURE — 96372 THER/PROPH/DIAG INJ SC/IM: CPT

## 2020-09-09 PROCEDURE — 94640 AIRWAY INHALATION TREATMENT: CPT

## 2020-09-09 PROCEDURE — 99215 OFFICE O/P EST HI 40 MIN: CPT | Mod: 25

## 2020-09-09 PROCEDURE — 81001 URINALYSIS AUTO W/SCOPE: CPT

## 2020-09-09 PROCEDURE — 80048 BASIC METABOLIC PNL TOTAL CA: CPT

## 2020-09-09 PROCEDURE — 85027 COMPLETE CBC AUTOMATED: CPT

## 2020-09-09 PROCEDURE — 71045 X-RAY EXAM CHEST 1 VIEW: CPT | Mod: 26

## 2020-09-09 PROCEDURE — 93005 ELECTROCARDIOGRAM TRACING: CPT

## 2020-09-09 PROCEDURE — 88304 TISSUE EXAM BY PATHOLOGIST: CPT

## 2020-09-09 PROCEDURE — 99223 1ST HOSP IP/OBS HIGH 75: CPT

## 2020-09-09 PROCEDURE — 93010 ELECTROCARDIOGRAM REPORT: CPT

## 2020-09-09 PROCEDURE — 93975 VASCULAR STUDY: CPT | Mod: 26

## 2020-09-09 PROCEDURE — 82962 GLUCOSE BLOOD TEST: CPT

## 2020-09-09 PROCEDURE — 36415 COLL VENOUS BLD VENIPUNCTURE: CPT

## 2020-09-09 RX ORDER — GLUCAGON INJECTION, SOLUTION 0.5 MG/.1ML
1 INJECTION, SOLUTION SUBCUTANEOUS ONCE
Refills: 0 | Status: DISCONTINUED | OUTPATIENT
Start: 2020-09-09 | End: 2020-09-13

## 2020-09-09 RX ORDER — MORPHINE SULFATE 50 MG/1
4 CAPSULE, EXTENDED RELEASE ORAL
Refills: 0 | Status: DISCONTINUED | OUTPATIENT
Start: 2020-09-09 | End: 2020-09-11

## 2020-09-09 RX ORDER — HYDROMORPHONE HYDROCHLORIDE 2 MG/ML
1 INJECTION INTRAMUSCULAR; INTRAVENOUS; SUBCUTANEOUS
Refills: 0 | Status: DISCONTINUED | OUTPATIENT
Start: 2020-09-09 | End: 2020-09-12

## 2020-09-09 RX ORDER — SODIUM CHLORIDE 9 MG/ML
1000 INJECTION, SOLUTION INTRAVENOUS
Refills: 0 | Status: DISCONTINUED | OUTPATIENT
Start: 2020-09-09 | End: 2020-09-13

## 2020-09-09 RX ORDER — DEXTROSE 50 % IN WATER 50 %
12.5 SYRINGE (ML) INTRAVENOUS ONCE
Refills: 0 | Status: DISCONTINUED | OUTPATIENT
Start: 2020-09-09 | End: 2020-09-13

## 2020-09-09 RX ORDER — ONDANSETRON 8 MG/1
4 TABLET, FILM COATED ORAL EVERY 6 HOURS
Refills: 0 | Status: DISCONTINUED | OUTPATIENT
Start: 2020-09-09 | End: 2020-09-13

## 2020-09-09 RX ORDER — DEXTROSE 50 % IN WATER 50 %
25 SYRINGE (ML) INTRAVENOUS ONCE
Refills: 0 | Status: DISCONTINUED | OUTPATIENT
Start: 2020-09-09 | End: 2020-09-13

## 2020-09-09 RX ORDER — BUDESONIDE AND FORMOTEROL FUMARATE DIHYDRATE 160; 4.5 UG/1; UG/1
2 AEROSOL RESPIRATORY (INHALATION)
Refills: 0 | Status: DISCONTINUED | OUTPATIENT
Start: 2020-09-09 | End: 2020-09-13

## 2020-09-09 RX ORDER — DEXTROSE 50 % IN WATER 50 %
15 SYRINGE (ML) INTRAVENOUS ONCE
Refills: 0 | Status: DISCONTINUED | OUTPATIENT
Start: 2020-09-09 | End: 2020-09-13

## 2020-09-09 RX ORDER — ASPIRIN/CALCIUM CARB/MAGNESIUM 324 MG
81 TABLET ORAL DAILY
Refills: 0 | Status: DISCONTINUED | OUTPATIENT
Start: 2020-09-09 | End: 2020-09-13

## 2020-09-09 RX ORDER — INFLUENZA VIRUS VACCINE 15; 15; 15; 15 UG/.5ML; UG/.5ML; UG/.5ML; UG/.5ML
0.5 SUSPENSION INTRAMUSCULAR ONCE
Refills: 0 | Status: DISCONTINUED | OUTPATIENT
Start: 2020-09-09 | End: 2020-09-13

## 2020-09-09 RX ORDER — SODIUM CHLORIDE 9 MG/ML
1000 INJECTION INTRAMUSCULAR; INTRAVENOUS; SUBCUTANEOUS
Refills: 0 | Status: DISCONTINUED | OUTPATIENT
Start: 2020-09-09 | End: 2020-09-13

## 2020-09-09 RX ORDER — MORPHINE SULFATE 50 MG/1
4 CAPSULE, EXTENDED RELEASE ORAL ONCE
Refills: 0 | Status: DISCONTINUED | OUTPATIENT
Start: 2020-09-09 | End: 2020-09-09

## 2020-09-09 RX ORDER — MORPHINE SULFATE 50 MG/1
2 CAPSULE, EXTENDED RELEASE ORAL EVERY 4 HOURS
Refills: 0 | Status: DISCONTINUED | OUTPATIENT
Start: 2020-09-09 | End: 2020-09-09

## 2020-09-09 RX ORDER — LOSARTAN POTASSIUM 100 MG/1
100 TABLET, FILM COATED ORAL DAILY
Refills: 0 | Status: DISCONTINUED | OUTPATIENT
Start: 2020-09-09 | End: 2020-09-13

## 2020-09-09 RX ADMIN — MORPHINE SULFATE 4 MILLIGRAM(S): 50 CAPSULE, EXTENDED RELEASE ORAL at 13:15

## 2020-09-09 RX ADMIN — SODIUM CHLORIDE 125 MILLILITER(S): 9 INJECTION INTRAMUSCULAR; INTRAVENOUS; SUBCUTANEOUS at 13:45

## 2020-09-09 RX ADMIN — KETOROLAC TROMETHAMINE 0 MG/ML: 30 INJECTION, SOLUTION INTRAMUSCULAR; INTRAVENOUS at 00:00

## 2020-09-09 RX ADMIN — MORPHINE SULFATE 4 MILLIGRAM(S): 50 CAPSULE, EXTENDED RELEASE ORAL at 20:12

## 2020-09-09 RX ADMIN — MORPHINE SULFATE 4 MILLIGRAM(S): 50 CAPSULE, EXTENDED RELEASE ORAL at 12:34

## 2020-09-09 NOTE — ASSESSMENT
[FreeTextEntry1] : Right testis pain:\par Right testicular infarct:\par \par Pt in moderate distress. Toradol injection given in office. Pain persistent. Pt advised to go [facility] ED for pain management. \par Pt scheduled tomorrow for Right orchiectomy and left orchiopexy tomorrow. \par Discussed risks and benefits. Discussed the procedure and the post operative course.

## 2020-09-09 NOTE — ED STATDOCS - OBJECTIVE STATEMENT
60 y/o M with PMHx of asthma, DM, GERD, HTN, and s/p appendectomy presents to the ED c/o worsening +R testicular pain. Was seen in ED on 9/3 for same complaint and was d/c on 9/4 to f/u with urologist Dr. Grider. Went to urologist today for increasing R testicular pain and was found to have +infarcts to testicle. Sent for admission, pain management, and medical clearance for procedure to remove R testicle tomorrow. No fever. Allergic to penicillins. PCP: Dr. Victor Manuel [place].

## 2020-09-09 NOTE — H&P ADULT - NSHPLABSRESULTS_GEN_ALL_CORE
13.4   7.87  )-----------( 303      ( 09 Sep 2020 12:25 )             41.6   09-09    141  |  110<H>  |  14  ----------------------------<  125<H>  4.0   |  27  |  1.39<H>    Ca    9.1      09 Sep 2020 12:25    TPro  7.2  /  Alb  3.1<L>  /  TBili  0.4  /  DBili  x   /  AST  69<H>  /  ALT  116<H>  /  AlkPhos  173<H>  09-09

## 2020-09-09 NOTE — ED STATDOCS - PROGRESS NOTE DETAILS
pt reval states he feels better with the morphine, pt aware he will be admitted and no eating at midnight, pt agrees with plan and well appearing. -Cristela Meek PA-C

## 2020-09-09 NOTE — ED ADULT NURSE NOTE - OBJECTIVE STATEMENT
Patient presents to the ED c/o worsening +R testicular pain. Was seen in ED on 9/3 for same complaint and was d/c on 9/4 to f/u with urologist Dr. Grider. Went to urologist today for increasing R testicular pain and was found to have +infarcts to testicle. Sent for admission, pain management, and medical clearance for procedure to remove R testicle tomorrow. No fever.

## 2020-09-09 NOTE — H&P ADULT - NSICDXPASTMEDICALHX_GEN_ALL_CORE_FT
PAST MEDICAL HISTORY:  Asthma     DM (diabetes mellitus)     GERD (gastroesophageal reflux disease)     HTN (hypertension)

## 2020-09-09 NOTE — H&P ADULT - ASSESSMENT
* Testicular infarct  OR in am  low risk for periop complications- pending EKG  prn analgesia    * T1DM on insulin pump  instructed not to use insulin after midnight in doses previously used for overnight coverage    * HTN resume Losartan

## 2020-09-09 NOTE — ED ADULT TRIAGE NOTE - CHIEF COMPLAINT QUOTE
as per pt " my testicle  and I'm having surgery tomorrow to remove it but I'm in so much pain my doctor ( Dr Grider) sent me if for something IV to control the pain"

## 2020-09-09 NOTE — PATIENT PROFILE ADULT - NSPROHMDIABETMGMTSTRAT_GEN_A_NUR
Refill requested:   Requested Prescriptions     Pending Prescriptions Disp Refills   • SYNTHROID 175 MCG Oral Tab [Pharmacy Med Name: SYNTHROID 0.175MG (175MCG) TABLETS] 90 tablet 0     Sig: Take 1 tablet (175 mcg total) by mouth daily.      Passed protocol
blood glucose testing/insulin therapy

## 2020-09-09 NOTE — PHYSICAL EXAM
[General Appearance - Well Developed] : well developed [General Appearance - Well Nourished] : well nourished [Well Groomed] : well groomed [Abdomen Soft] : soft [] : no respiratory distress [Exaggerated Use Of Accessory Muscles For Inspiration] : no accessory muscle use [Oriented To Time, Place, And Person] : oriented to person, place, and time [Not Anxious] : not anxious [Mood] : the mood was normal [Affect] : the affect was normal [No Focal Deficits] : no focal deficits [Skin Color & Pigmentation] : normal skin color and pigmentation [FreeTextEntry1] : abnormal gait due to testicular pain

## 2020-09-09 NOTE — H&P ADULT - NSHPPHYSICALEXAM_GEN_ALL_CORE
heent nc at perrla  chest cta  cvs s1s2 reg, no m/g/r  abd soft nt nd +bs  extr no e/c/c  tender right testicle  neurononfocal

## 2020-09-09 NOTE — HISTORY OF PRESENT ILLNESS
[FreeTextEntry1] : 58 yo male presents for testis pain.\par Patient was seen last week in  for right testicular pain and right lower abdominal pain. \par Pt was treated from Epididymitis and sent home on Levaquin and pain medicine. \par Had called yesterday with complain of worsening right testis pain and swelling. \par Stat Scrotal Ultrasound obtained as out patient showed right testicular infarct. Had called with results and discussed management options. \par \par Pt states his testicular pain started ~12 hours earlier prior to going to ED last week and then radiated to inguinal area and abdomen. Scrotal US in ED found possible right epididymitis with normal blood flow. CT abd/pel showed right hydrocele and fluid in right inguinal canal. Pt stayed in hospital for few days and discharged home on oxycodone and Levaquin after feeling better. \par

## 2020-09-10 ENCOUNTER — RESULT REVIEW (OUTPATIENT)
Age: 60
End: 2020-09-10

## 2020-09-10 LAB
SARS-COV-2 IGG SERPL QL IA: NEGATIVE — SIGNIFICANT CHANGE UP
SARS-COV-2 IGM SERPL IA-ACNC: <0.1 INDEX — SIGNIFICANT CHANGE UP

## 2020-09-10 PROCEDURE — 54520 REMOVAL OF TESTIS: CPT | Mod: RT

## 2020-09-10 PROCEDURE — 88304 TISSUE EXAM BY PATHOLOGIST: CPT | Mod: 26

## 2020-09-10 PROCEDURE — 54640 ORCHIOPEXY INGUN/SCROT APPR: CPT | Mod: LT

## 2020-09-10 PROCEDURE — 99254 IP/OBS CNSLTJ NEW/EST MOD 60: CPT | Mod: 25

## 2020-09-10 PROCEDURE — 88305 TISSUE EXAM BY PATHOLOGIST: CPT | Mod: 26

## 2020-09-10 PROCEDURE — 99233 SBSQ HOSP IP/OBS HIGH 50: CPT

## 2020-09-10 RX ORDER — CEFAZOLIN SODIUM 1 G
500 VIAL (EA) INJECTION EVERY 8 HOURS
Refills: 0 | Status: DISCONTINUED | OUTPATIENT
Start: 2020-09-10 | End: 2020-09-10

## 2020-09-10 RX ORDER — SENNA PLUS 8.6 MG/1
2 TABLET ORAL AT BEDTIME
Refills: 0 | Status: DISCONTINUED | OUTPATIENT
Start: 2020-09-10 | End: 2020-09-13

## 2020-09-10 RX ORDER — CEFAZOLIN SODIUM 1 G
500 VIAL (EA) INJECTION EVERY 8 HOURS
Refills: 0 | Status: DISCONTINUED | OUTPATIENT
Start: 2020-09-10 | End: 2020-09-12

## 2020-09-10 RX ORDER — OXYCODONE HYDROCHLORIDE 5 MG/1
10 TABLET ORAL ONCE
Refills: 0 | Status: DISCONTINUED | OUTPATIENT
Start: 2020-09-10 | End: 2020-09-10

## 2020-09-10 RX ORDER — SODIUM CHLORIDE 9 MG/ML
1000 INJECTION, SOLUTION INTRAVENOUS
Refills: 0 | Status: DISCONTINUED | OUTPATIENT
Start: 2020-09-10 | End: 2020-09-10

## 2020-09-10 RX ORDER — FENTANYL CITRATE 50 UG/ML
50 INJECTION INTRAVENOUS
Refills: 0 | Status: DISCONTINUED | OUTPATIENT
Start: 2020-09-10 | End: 2020-09-10

## 2020-09-10 RX ORDER — ONDANSETRON 8 MG/1
4 TABLET, FILM COATED ORAL ONCE
Refills: 0 | Status: COMPLETED | OUTPATIENT
Start: 2020-09-10 | End: 2020-09-10

## 2020-09-10 RX ORDER — BACITRACIN ZINC 500 UNIT/G
1 OINTMENT IN PACKET (EA) TOPICAL
Refills: 0 | Status: DISCONTINUED | OUTPATIENT
Start: 2020-09-10 | End: 2020-09-13

## 2020-09-10 RX ORDER — PROCHLORPERAZINE MALEATE 5 MG
10 TABLET ORAL ONCE
Refills: 0 | Status: COMPLETED | OUTPATIENT
Start: 2020-09-10 | End: 2020-09-10

## 2020-09-10 RX ADMIN — HYDROMORPHONE HYDROCHLORIDE 1 MILLIGRAM(S): 2 INJECTION INTRAMUSCULAR; INTRAVENOUS; SUBCUTANEOUS at 20:22

## 2020-09-10 RX ADMIN — SENNA PLUS 2 TABLET(S): 8.6 TABLET ORAL at 21:18

## 2020-09-10 RX ADMIN — HYDROMORPHONE HYDROCHLORIDE 1 MILLIGRAM(S): 2 INJECTION INTRAMUSCULAR; INTRAVENOUS; SUBCUTANEOUS at 13:53

## 2020-09-10 RX ADMIN — HYDROMORPHONE HYDROCHLORIDE 1 MILLIGRAM(S): 2 INJECTION INTRAMUSCULAR; INTRAVENOUS; SUBCUTANEOUS at 20:40

## 2020-09-10 RX ADMIN — FENTANYL CITRATE 50 MICROGRAM(S): 50 INJECTION INTRAVENOUS at 14:37

## 2020-09-10 RX ADMIN — HYDROMORPHONE HYDROCHLORIDE 1 MILLIGRAM(S): 2 INJECTION INTRAMUSCULAR; INTRAVENOUS; SUBCUTANEOUS at 13:23

## 2020-09-10 RX ADMIN — FENTANYL CITRATE 50 MICROGRAM(S): 50 INJECTION INTRAVENOUS at 15:07

## 2020-09-10 RX ADMIN — Medication 1 APPLICATION(S): at 21:24

## 2020-09-10 RX ADMIN — MORPHINE SULFATE 4 MILLIGRAM(S): 50 CAPSULE, EXTENDED RELEASE ORAL at 06:07

## 2020-09-10 RX ADMIN — ONDANSETRON 4 MILLIGRAM(S): 8 TABLET, FILM COATED ORAL at 14:02

## 2020-09-10 RX ADMIN — MORPHINE SULFATE 4 MILLIGRAM(S): 50 CAPSULE, EXTENDED RELEASE ORAL at 06:25

## 2020-09-10 RX ADMIN — LOSARTAN POTASSIUM 100 MILLIGRAM(S): 100 TABLET, FILM COATED ORAL at 17:11

## 2020-09-10 RX ADMIN — Medication 500 MILLIGRAM(S): at 21:18

## 2020-09-10 RX ADMIN — Medication 81 MILLIGRAM(S): at 17:11

## 2020-09-10 NOTE — CONSULT NOTE ADULT - SUBJECTIVE AND OBJECTIVE BOX
HPI:  60 y/o M with PMHx of asthma, DM, GERD, HTN, and s/p appendectomy presents to the ED c/o worsening +R testicular pain. Was seen in ED on 9/3 for same complaint and was d/c on  to f/u with urologist Dr. Grider. Went to urologist today for increasing R testicular pain and was found to have +infarcts to testicle. Sent for admission, pain management, and medical clearance for procedure to remove R testicle tomorrow. No fever. Allergic to penicillins. PCP: Dr. Victor Manuel Park.    He was dx with DM in , told was type 2 but later on he was labeled as type 1. He is using an automated  insulin pump with Humalog, no GAMEZ at bedtime. 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 Patient was seen at the office earlier yesterday and continued to have testicular pain and therefore was sent to the ER for pain control and is scheduled for a right orchiectomy today. Patient seen and examined at bedside this am. Patient reports his pain is controlled with pain meds and denies any fevers, chills, n/v, abd pain.     PAST MEDICAL & SURGICAL HISTORY:  Asthma  GERD (gastroesophageal reflux disease)  HTN (hypertension)  DM (diabetes mellitus)  S/P appendectomy      REVIEW OF SYSTEMS   All other review of systems neg, except as noted in HPI    MEDICATIONS  (STANDING):  aspirin  chewable 81 milliGRAM(s) Oral daily  budesonide  80 MICROgram(s)/formoterol 4.5 MICROgram(s) Inhaler 2 Puff(s) Inhalation two times a day  dextrose 5%. 1000 milliLiter(s) (50 mL/Hr) IV Continuous <Continuous>  dextrose 50% Injectable 12.5 Gram(s) IV Push once  dextrose 50% Injectable 25 Gram(s) IV Push once  dextrose 50% Injectable 25 Gram(s) IV Push once  influenza   Vaccine 0.5 milliLiter(s) IntraMuscular once  losartan 100 milliGRAM(s) Oral daily  sodium chloride 0.9%. 1000 milliLiter(s) (125 mL/Hr) IV Continuous <Continuous>    MEDICATIONS  (PRN):  aluminum hydroxide/magnesium hydroxide/simethicone Suspension 30 milliLiter(s) Oral every 4 hours PRN Dyspepsia  dextrose 40% Gel 15 Gram(s) Oral once PRN Blood Glucose LESS THAN 70 milliGRAM(s)/deciliter  glucagon  Injectable 1 milliGRAM(s) IntraMuscular once PRN Glucose LESS THAN 70 milligrams/deciliter  HYDROmorphone  Injectable 1 milliGRAM(s) IV Push every 3 hours PRN Severe Pain (7 - 10)  morphine  - Injectable 4 milliGRAM(s) IV Push every 3 hours PRN Moderate Pain (4 - 6)  ondansetron Injectable 4 milliGRAM(s) IV Push every 6 hours PRN Nausea      Allergies    penicillins (Rash)    Intolerances        SOCIAL HISTORY:    FAMILY HISTORY:  Family history of coronary artery disease in mother      Vital Signs Last 24 Hrs  T(C): 36.7 (09 Sep 2020 23:25), Max: 37 (09 Sep 2020 17:35)  T(F): 98.1 (09 Sep 2020 23:25), Max: 98.6 (09 Sep 2020 17:35)  HR: 88 (09 Sep 2020 23:25) (72 - 88)  BP: 133/67 (10 Sep 2020 06:02) (132/77 - 179/89)  BP(mean): 87 (09 Sep 2020 17:35) (87 - 99)  RR: 18 (09 Sep 2020 21:01) (17 - 18)  SpO2: 95% (10 Sep 2020 06:02) (95% - 100%)    PHYSICAL EXAM:  General: No distress, No anxiety  VITALS  T(C): 36.7 (20 @ 23:25), Max: 37 (20 @ 17:35)  HR: 88 (20 @ 23:25) (72 - 88)  BP: 133/67 (09-10-20 @ 06:02) (132/77 - 179/89)  RR: 18 (20 @ 21:01) (17 - 18)  SpO2: 95% (09-10-20 @ 06:02) (95% - 100%)            Skin     : No jaundice, No lesions, No swelling  HEENT: No icterus , EOM full , No epistaxis  Lung    : No resp distress  Abdo:   : Soft, Non tender, No guarding, No distension    Back    : No CVAT b/l  Extremity: No calf tenderness, No edema  Genitalia Male: No Kim, mild tenderness to palpation over R testicle. No erythema, No crepitation, No induration or fluctuance   Neuro   : A&Ox3      LABS:                        13.4   7.87  )-----------( 303      ( 09 Sep 2020 12:25 )             41.6         141  |  110<H>  |  14  ----------------------------<  125<H>  4.0   |  27  |  1.39<H>    Ca    9.1      09 Sep 2020 12:25    TPro  7.2  /  Alb  3.1<L>  /  TBili  0.4  /  DBili  x   /  AST  69<H>  /  ALT  116<H>  /  AlkPhos  173<H>      PT/INR - ( 09 Sep 2020 12:25 )   PT: 13.6 sec;   INR: 1.17 ratio         PTT - ( 09 Sep 2020 12:25 )  PTT:33.1 sec  Urinalysis Basic - ( 09 Sep 2020 16:53 )    Color: Yellow / Appearance: Clear / S.015 / pH: x  Gluc: x / Ketone: Negative  / Bili: Negative / Urobili: Negative mg/dL   Blood: x / Protein: 30 mg/dL / Nitrite: Negative   Leuk Esterase: Negative / RBC: 3-5 /HPF / WBC 0-2   Sq Epi: x / Non Sq Epi: Occasional / Bacteria: Negative        RADIOLOGY & ADDITIONAL STUDIES:      EXAM:  US DPLX SCROTUM        PROCEDURE DATE:  2020           INTERPRETATION:  CLINICAL INFORMATION: Right testicular pain.    COMPARISON: Abdominal CT dated 2020 and scrotal ultrasound dated 2020.    TECHNIQUE: Testicular ultrasound utilizing color and spectral Doppler.    FINDINGS:    RIGHT:  Right testis: 5.0 x 4.5 x 3.4 cm. The testicle is enlarged and diffusely hypoechoic in appearance which is a new finding as compared with the prior study. There is no flow within the testicle. Findings are consistent with testicular infarct.    Right epididymis: The epididymis is enlarged and demonstrates no flow.  Right hydrocele: Small complex right hydrocele containing internal septations is noted.  Right varicocele: None.    LEFT:  Left testis: 4.5 x 2.8 x 3.0 cm. Normal echogenicity and echotexture with no masses or areas of architectural distortion. Normal arterial and venous blood flow pattern.  Left epididymis: Within normal limits.  Left hydrocele: Small.  Left varicocele: None.    IMPRESSION:    Findings consistent with right testicular infarct likely secondary to torsion.    Findings discussed with Dr. Kumar on  2020 5:29 PM with read back.                     GRACE NELSON M.D., ATTENDING RADIOLOGIST Phone#: [phone]  This document has been electronically signed. Sep  8 2020  5:54PM

## 2020-09-10 NOTE — CONSULT NOTE ADULT - ASSESSMENT
58 yo male with right testicular pain and right testicular infarct.   - OR today for right orchiectomy   - Keep NPO until procedure    Case discussed with Dr. Grider 58 yo male with right testicular pain and right testicular infarct.   - OR today for right orchiectomy and left orchiopexy  - Keep NPO until procedure    Case discussed with Dr. Grider

## 2020-09-10 NOTE — PROGRESS NOTE ADULT - SUBJECTIVE AND OBJECTIVE BOX
As per Dr. Grider, pt to be transferred to urology service. Spoke with ZO Talbot who is aware and will call admitting for this this transfer to urology service and will notify unit secretary to call hospitalist consult for comanagement. As per Dr. Grider, pt to be transferred to urology service. Spoke with ZO Justice who is aware and will call admitting for this this transfer to urology service and will notify unit secretary to call hospitalist consult for comanagement.

## 2020-09-10 NOTE — PROGRESS NOTE ADULT - SUBJECTIVE AND OBJECTIVE BOX
Pt is s/p right orchiectomy and left orchiopexy. As per Dr. Grider patient should have bacitracin to incision sites/ dressing changes twice a day and  as needed. Recommend pt to remain on Ancef and d/c with Keflex. Pain meds PRN. Scrotal support. Ice pack 10 min on and 10 min off as needed. Follow up in 1 week in the office.

## 2020-09-10 NOTE — BRIEF OPERATIVE NOTE - NSICDXBRIEFPROCEDURE_GEN_ALL_CORE_FT
PROCEDURES:  Left orchiopexy by scrotal approach 10-Sep-2020 13:00:18  José Grider  Right orchiectomy by scrotal approach 10-Sep-2020 12:59:59  José Grider

## 2020-09-11 DIAGNOSIS — N50.1 VASCULAR DISORDERS OF MALE GENITAL ORGANS: ICD-10-CM

## 2020-09-11 DIAGNOSIS — N50.811 RIGHT TESTICULAR PAIN: ICD-10-CM

## 2020-09-11 LAB
ANION GAP SERPL CALC-SCNC: 4 MMOL/L — LOW (ref 5–17)
BUN SERPL-MCNC: 16 MG/DL — SIGNIFICANT CHANGE UP (ref 7–23)
CALCIUM SERPL-MCNC: 8.7 MG/DL — SIGNIFICANT CHANGE UP (ref 8.5–10.1)
CHLORIDE SERPL-SCNC: 110 MMOL/L — HIGH (ref 96–108)
CO2 SERPL-SCNC: 27 MMOL/L — SIGNIFICANT CHANGE UP (ref 22–31)
CREAT SERPL-MCNC: 1.45 MG/DL — HIGH (ref 0.5–1.3)
GLUCOSE SERPL-MCNC: 242 MG/DL — HIGH (ref 70–99)
HCT VFR BLD CALC: 39.8 % — SIGNIFICANT CHANGE UP (ref 39–50)
HGB BLD-MCNC: 12.2 G/DL — LOW (ref 13–17)
MCHC RBC-ENTMCNC: 26.2 PG — LOW (ref 27–34)
MCHC RBC-ENTMCNC: 30.7 GM/DL — LOW (ref 32–36)
MCV RBC AUTO: 85.6 FL — SIGNIFICANT CHANGE UP (ref 80–100)
PLATELET # BLD AUTO: 292 K/UL — SIGNIFICANT CHANGE UP (ref 150–400)
POTASSIUM SERPL-MCNC: 4.4 MMOL/L — SIGNIFICANT CHANGE UP (ref 3.5–5.3)
POTASSIUM SERPL-SCNC: 4.4 MMOL/L — SIGNIFICANT CHANGE UP (ref 3.5–5.3)
RBC # BLD: 4.65 M/UL — SIGNIFICANT CHANGE UP (ref 4.2–5.8)
RBC # FLD: 14.1 % — SIGNIFICANT CHANGE UP (ref 10.3–14.5)
SODIUM SERPL-SCNC: 141 MMOL/L — SIGNIFICANT CHANGE UP (ref 135–145)
WBC # BLD: 9.87 K/UL — SIGNIFICANT CHANGE UP (ref 3.8–10.5)
WBC # FLD AUTO: 9.87 K/UL — SIGNIFICANT CHANGE UP (ref 3.8–10.5)

## 2020-09-11 PROCEDURE — 99232 SBSQ HOSP IP/OBS MODERATE 35: CPT

## 2020-09-11 RX ORDER — OXYCODONE AND ACETAMINOPHEN 5; 325 MG/1; MG/1
1 TABLET ORAL EVERY 6 HOURS
Refills: 0 | Status: DISCONTINUED | OUTPATIENT
Start: 2020-09-11 | End: 2020-09-12

## 2020-09-11 RX ORDER — INSULIN LISPRO 100/ML
6 VIAL (ML) SUBCUTANEOUS ONCE
Refills: 0 | Status: COMPLETED | OUTPATIENT
Start: 2020-09-11 | End: 2020-09-11

## 2020-09-11 RX ORDER — POLYETHYLENE GLYCOL 3350 17 G/17G
17 POWDER, FOR SOLUTION ORAL DAILY
Refills: 0 | Status: DISCONTINUED | OUTPATIENT
Start: 2020-09-11 | End: 2020-09-13

## 2020-09-11 RX ORDER — INSULIN LISPRO 100/ML
8 VIAL (ML) SUBCUTANEOUS ONCE
Refills: 0 | Status: COMPLETED | OUTPATIENT
Start: 2020-09-11 | End: 2020-09-11

## 2020-09-11 RX ORDER — INSULIN LISPRO 100/ML
12 VIAL (ML) SUBCUTANEOUS ONCE
Refills: 0 | Status: COMPLETED | OUTPATIENT
Start: 2020-09-11 | End: 2020-09-11

## 2020-09-11 RX ORDER — INSULIN LISPRO 100/ML
VIAL (ML) SUBCUTANEOUS AT BEDTIME
Refills: 0 | Status: DISCONTINUED | OUTPATIENT
Start: 2020-09-11 | End: 2020-09-11

## 2020-09-11 RX ORDER — INSULIN GLARGINE 100 [IU]/ML
5 INJECTION, SOLUTION SUBCUTANEOUS EVERY MORNING
Refills: 0 | Status: DISCONTINUED | OUTPATIENT
Start: 2020-09-11 | End: 2020-09-11

## 2020-09-11 RX ORDER — INSULIN LISPRO 100/ML
VIAL (ML) SUBCUTANEOUS
Refills: 0 | Status: DISCONTINUED | OUTPATIENT
Start: 2020-09-11 | End: 2020-09-11

## 2020-09-11 RX ORDER — INSULIN LISPRO 100/ML
5 VIAL (ML) SUBCUTANEOUS
Refills: 0 | Status: DISCONTINUED | OUTPATIENT
Start: 2020-09-11 | End: 2020-09-11

## 2020-09-11 RX ADMIN — POLYETHYLENE GLYCOL 3350 17 GRAM(S): 17 POWDER, FOR SOLUTION ORAL at 18:20

## 2020-09-11 RX ADMIN — Medication 1 APPLICATION(S): at 14:36

## 2020-09-11 RX ADMIN — Medication 12 UNIT(S): at 13:03

## 2020-09-11 RX ADMIN — Medication 500 MILLIGRAM(S): at 14:35

## 2020-09-11 RX ADMIN — HYDROMORPHONE HYDROCHLORIDE 1 MILLIGRAM(S): 2 INJECTION INTRAMUSCULAR; INTRAVENOUS; SUBCUTANEOUS at 20:07

## 2020-09-11 RX ADMIN — Medication 8 UNIT(S): at 11:02

## 2020-09-11 RX ADMIN — MORPHINE SULFATE 4 MILLIGRAM(S): 50 CAPSULE, EXTENDED RELEASE ORAL at 01:55

## 2020-09-11 RX ADMIN — HYDROMORPHONE HYDROCHLORIDE 1 MILLIGRAM(S): 2 INJECTION INTRAMUSCULAR; INTRAVENOUS; SUBCUTANEOUS at 05:40

## 2020-09-11 RX ADMIN — Medication 1 APPLICATION(S): at 21:52

## 2020-09-11 RX ADMIN — OXYCODONE AND ACETAMINOPHEN 1 TABLET(S): 5; 325 TABLET ORAL at 18:49

## 2020-09-11 RX ADMIN — Medication 81 MILLIGRAM(S): at 09:20

## 2020-09-11 RX ADMIN — LOSARTAN POTASSIUM 100 MILLIGRAM(S): 100 TABLET, FILM COATED ORAL at 09:20

## 2020-09-11 RX ADMIN — Medication 500 MILLIGRAM(S): at 21:52

## 2020-09-11 RX ADMIN — MORPHINE SULFATE 4 MILLIGRAM(S): 50 CAPSULE, EXTENDED RELEASE ORAL at 01:37

## 2020-09-11 RX ADMIN — Medication 500 MILLIGRAM(S): at 05:30

## 2020-09-11 RX ADMIN — SENNA PLUS 2 TABLET(S): 8.6 TABLET ORAL at 21:52

## 2020-09-11 RX ADMIN — HYDROMORPHONE HYDROCHLORIDE 1 MILLIGRAM(S): 2 INJECTION INTRAMUSCULAR; INTRAVENOUS; SUBCUTANEOUS at 05:59

## 2020-09-11 RX ADMIN — Medication 6 UNIT(S): at 08:44

## 2020-09-11 NOTE — PROGRESS NOTE ADULT - ASSESSMENT
OOB ambulate  Oral pain meds.   Continue scrotal support  Bacitracin oint BID  Change dressing as needed.  Possible discharge tomorrow

## 2020-09-11 NOTE — PROVIDER CONTACT NOTE (OTHER) - SITUATION
Pt is Type 1 diabetic. As per MD order pt allowed to use own insulin pump. Pt currently has no more insulin left on his own device.

## 2020-09-11 NOTE — PROGRESS NOTE ADULT - SUBJECTIVE AND OBJECTIVE BOX
Patient is a 59y old  Male who presents with a chief complaint of testicular infarct (10 Sep 2020 15:50)      Vital Signs Last 24 Hrs  T(C): 36.8 (11 Sep 2020 05:10), Max: 37.3 (10 Sep 2020 13:01)  T(F): 98.2 (11 Sep 2020 05:10), Max: 99.1 (10 Sep 2020 13:01)  HR: 81 (11 Sep 2020 05:10) (81 - 98)  BP: 148/74 (11 Sep 2020 05:10) (117/61 - 186/92)  BP(mean): 89 (10 Sep 2020 15:00) (89 - 112)  RR: 18 (11 Sep 2020 05:10) (10 - 19)  SpO2: 97% (11 Sep 2020 05:10) (94% - 100%)    LABS:                        13.4   7.87  )-----------( 303      ( 09 Sep 2020 12:25 )             41.6         141  |  110<H>  |  14  ----------------------------<  125<H>  4.0   |  27  |  1.39<H>    Ca    9.1      09 Sep 2020 12:25    TPro  7.2  /  Alb  3.1<L>  /  TBili  0.4  /  DBili  x   /  AST  69<H>  /  ALT  116<H>  /  AlkPhos  173<H>  09-    PT/INR - ( 09 Sep 2020 12:25 )   PT: 13.6 sec;   INR: 1.17 ratio         PTT - ( 09 Sep 2020 12:25 )  PTT:33.1 sec  Urinalysis Basic - ( 09 Sep 2020 16:53 )    Color: Yellow / Appearance: Clear / S.015 / pH: x  Gluc: x / Ketone: Negative  / Bili: Negative / Urobili: Negative mg/dL   Blood: x / Protein: 30 mg/dL / Nitrite: Negative   Leuk Esterase: Negative / RBC: 3-5 /HPF / WBC 0-2   Sq Epi: x / Non Sq Epi: Occasional / Bacteria: Negative Patient is a 59y old  Male who presents with a chief complaint of testicular infarct (10 Sep 2020 15:50)  s/p Right orchiectomy and left orchiopexy POD#1   c/o incisional pain and on left scrotum. Still using IV pain meds.       Vital Signs Last 24 Hrs  T(C): 36.8 (11 Sep 2020 05:10), Max: 37.3 (10 Sep 2020 13:01)  T(F): 98.2 (11 Sep 2020 05:10), Max: 99.1 (10 Sep 2020 13:01)  HR: 81 (11 Sep 2020 05:10) (81 - 98)  BP: 148/74 (11 Sep 2020 05:10) (117/61 - 186/92)  BP(mean): 89 (10 Sep 2020 15:00) (89 - 112)  RR: 18 (11 Sep 2020 05:10) (10 - 19)  SpO2: 97% (11 Sep 2020 05:10) (94% - 100%)  AAO x 3   Normal respiratory effort  Normal peripheral circulation  Abdomen- Soft, ND, NT   Incision: C, D, I, scrotal swelling    LABS:                        13.4   7.87  )-----------( 303      ( 09 Sep 2020 12:25 )             41.6         141  |  110<H>  |  14  ----------------------------<  125<H>  4.0   |  27  |  1.39<H>    Ca    9.1      09 Sep 2020 12:25    TPro  7.2  /  Alb  3.1<L>  /  TBili  0.4  /  DBili  x   /  AST  69<H>  /  ALT  116<H>  /  AlkPhos  173<H>      PT/INR - ( 09 Sep 2020 12:25 )   PT: 13.6 sec;   INR: 1.17 ratio         PTT - ( 09 Sep 2020 12:25 )  PTT:33.1 sec  Urinalysis Basic - ( 09 Sep 2020 16:53 )    Color: Yellow / Appearance: Clear / S.015 / pH: x  Gluc: x / Ketone: Negative  / Bili: Negative / Urobili: Negative mg/dL   Blood: x / Protein: 30 mg/dL / Nitrite: Negative   Leuk Esterase: Negative / RBC: 3-5 /HPF / WBC 0-2   Sq Epi: x / Non Sq Epi: Occasional / Bacteria: Negative

## 2020-09-11 NOTE — PROGRESS NOTE ADULT - SUBJECTIVE AND OBJECTIVE BOX
Cheif complaints and Diagnosis: Testicular infarction    Subjective: pain is controlled w/ meds. no n/v. +constipation       REVIEW OF SYSTEMS: no chest pain, no dyspnea    T(C): 37.3 (09-11-20 @ 20:00), Max: 37.3 (09-11-20 @ 20:00)  HR: 102 (09-11-20 @ 20:00) (81 - 102)  BP: 145/60 (09-11-20 @ 20:00) (117/61 - 159/72)  RR: 18 (09-11-20 @ 20:00) (17 - 18)  SpO2: 96% (09-11-20 @ 20:00) (96% - 97%)    Gen - Pleasant, cooperative, in no acute distress  HEENT- PERRL, moist mucus membranes, OP clear  CV - RRR, No m/r/g, +pulses, no edema  Lungs - Good effort, Clear to auscultation bilaterally  Abdomen - adiposic, Soft, nontender, nondistended, +BS, No rebound/rigidity/guarding  Ext - No cyanosis/clubbing.   Skin - No rashes,  deferred. No jaundice  Psych- Alert & oriented x 3  Neuro- fluent speech, no facial droop, EOMI. moves all ext        MEDICATIONS  (STANDING):  aspirin  chewable 81 milliGRAM(s) Oral daily  budesonide  80 MICROgram(s)/formoterol 4.5 MICROgram(s) Inhaler 2 Puff(s) Inhalation two times a day  dextrose 5%. 1000 milliLiter(s) (50 mL/Hr) IV Continuous <Continuous>  dextrose 50% Injectable 12.5 Gram(s) IV Push once  dextrose 50% Injectable 25 Gram(s) IV Push once  dextrose 50% Injectable 25 Gram(s) IV Push once  influenza   Vaccine 0.5 milliLiter(s) IntraMuscular once  losartan 100 milliGRAM(s) Oral daily  sodium chloride 0.9%. 1000 milliLiter(s) (125 mL/Hr) IV Continuous <Continuous>    MEDICATIONS  (PRN):  aluminum hydroxide/magnesium hydroxide/simethicone Suspension 30 milliLiter(s) Oral every 4 hours PRN Dyspepsia  dextrose 40% Gel 15 Gram(s) Oral once PRN Blood Glucose LESS THAN 70 milliGRAM(s)/deciliter  glucagon  Injectable 1 milliGRAM(s) IntraMuscular once PRN Glucose LESS THAN 70 milligrams/deciliter  HYDROmorphone  Injectable 1 milliGRAM(s) IV Push every 3 hours PRN Severe Pain (7 - 10)  morphine  - Injectable 4 milliGRAM(s) IV Push every 3 hours PRN Moderate Pain (4 - 6)  ondansetron Injectable 4 milliGRAM(s) IV Push every 6 hours PRN Nausea    LABS: All Labs Reviewed:                        12.2   9.87  )-----------( 292      ( 11 Sep 2020 09:07 )             39.8     09-11    141  |  110<H>  |  16  ----------------------------<  242<H>  4.4   |  27  |  1.45<H>    Ca    8.7      11 Sep 2020 09:07      RADIOLOGY RESULTS:    < from: CT Abdomen and Pelvis w/ IV Cont (09.03.20 @ 08:02) >    IMPRESSION:  Mild right-sided hydrocele and fluid within the right inguinal canal.    < end of copied text >      < from: US Doppler Scrotum (09.09.20 @ 09:51) >  IMPRESSION:    Findings consistent with right testicular infarct likely secondary to torsion.    < end of copied text >      Assessment and Plan:      60 y/o M with PMHx of asthma, DM, GERD, HTN, and s/p appendectomy presents to the ED c/o worsening +R testicular pain. Was seen in ED on 9/3 for same complaint and was d/c on 9/4 to f/u with urologist Dr. Grider. Went to urologist today for increasing R testicular pain and was found to have +infarcts to testicle. Sent for admission, pain management, and medical clearance for procedure to remove R testicle tomorrow. No fever. Allergic to penicillins. PCP: Dr. Victor Manuel Park.    He was dx with DM in 1992, told was type 2 but later on he was labeled as type 1. He is using an automated  insulin pump with Humalog, no GAMEZ at bedtime.       * Testicular infarct  s/p Left orchiopexy and Right orchiectomy by scrotal approach, 9/10/2020  prn analgesia  wound care instructions per urology  urology consult appreciated    * T1DM on insulin pump  -hba1c 8.3; patient will need to follow up Endocrinologist outpatient for better insulin control.   - no reservoir for pump today, ordered humalog subQ and ISS. now wife has brought in reservoir for pump and pt is able to use own pump    * HTN resumed Losartan. monitor renal function. if creat increasing, then switch to a different antihypertensive    *DVT prophy- encourage early ambulation post op Cheif complaints and Diagnosis: Testicular infarction    Subjective: pain is controlled w/ meds. no n/v. +constipation       REVIEW OF SYSTEMS: no chest pain, no dyspnea    T(C): 37.3 (09-11-20 @ 20:00), Max: 37.3 (09-11-20 @ 20:00)  HR: 102 (09-11-20 @ 20:00) (81 - 102)  BP: 145/60 (09-11-20 @ 20:00) (117/61 - 159/72)  RR: 18 (09-11-20 @ 20:00) (17 - 18)  SpO2: 96% (09-11-20 @ 20:00) (96% - 97%)    Gen - Pleasant, cooperative, in no acute distress  HEENT- PERRL, moist mucus membranes, OP clear  CV - RRR, No m/r/g, +pulses, no edema  Lungs - Good effort, Clear to auscultation bilaterally  Abdomen - adiposic, Soft, nontender, nondistended, +BS, No rebound/rigidity/guarding  Ext - No cyanosis/clubbing.   Skin - No rashes,  deferred. No jaundice  Psych- Alert & oriented x 3  Neuro- fluent speech, no facial droop, EOMI. moves all ext        MEDICATIONS  (STANDING):  aspirin  chewable 81 milliGRAM(s) Oral daily  budesonide  80 MICROgram(s)/formoterol 4.5 MICROgram(s) Inhaler 2 Puff(s) Inhalation two times a day  dextrose 5%. 1000 milliLiter(s) (50 mL/Hr) IV Continuous <Continuous>  dextrose 50% Injectable 12.5 Gram(s) IV Push once  dextrose 50% Injectable 25 Gram(s) IV Push once  dextrose 50% Injectable 25 Gram(s) IV Push once  influenza   Vaccine 0.5 milliLiter(s) IntraMuscular once  losartan 100 milliGRAM(s) Oral daily  sodium chloride 0.9%. 1000 milliLiter(s) (125 mL/Hr) IV Continuous <Continuous>    MEDICATIONS  (PRN):  aluminum hydroxide/magnesium hydroxide/simethicone Suspension 30 milliLiter(s) Oral every 4 hours PRN Dyspepsia  dextrose 40% Gel 15 Gram(s) Oral once PRN Blood Glucose LESS THAN 70 milliGRAM(s)/deciliter  glucagon  Injectable 1 milliGRAM(s) IntraMuscular once PRN Glucose LESS THAN 70 milligrams/deciliter  HYDROmorphone  Injectable 1 milliGRAM(s) IV Push every 3 hours PRN Severe Pain (7 - 10)  morphine  - Injectable 4 milliGRAM(s) IV Push every 3 hours PRN Moderate Pain (4 - 6)  ondansetron Injectable 4 milliGRAM(s) IV Push every 6 hours PRN Nausea    LABS: All Labs Reviewed:                        12.2   9.87  )-----------( 292      ( 11 Sep 2020 09:07 )             39.8     09-11    141  |  110<H>  |  16  ----------------------------<  242<H>  4.4   |  27  |  1.45<H>    Ca    8.7      11 Sep 2020 09:07      RADIOLOGY RESULTS:    < from: CT Abdomen and Pelvis w/ IV Cont (09.03.20 @ 08:02) >    IMPRESSION:  Mild right-sided hydrocele and fluid within the right inguinal canal.    < end of copied text >      < from: US Doppler Scrotum (09.09.20 @ 09:51) >  IMPRESSION:    Findings consistent with right testicular infarct likely secondary to torsion.    < end of copied text >      Assessment and Plan:      60 y/o M with PMHx of asthma, DM, GERD, HTN, and s/p appendectomy presents to the ED c/o worsening +R testicular pain. Was seen in ED on 9/3 for same complaint and was d/c on 9/4 to f/u with urologist Dr. Grider. Went to urologist today for increasing R testicular pain and was found to have +infarcts to testicle. Sent for admission, pain management, and medical clearance for procedure to remove R testicle tomorrow. No fever. Allergic to penicillins. PCP: Dr. Victor Manuel Park.    He was dx with DM in 1992, told was type 2 but later on he was labeled as type 1. He is using an automated  insulin pump with Humalog, no GAMEZ at bedtime.       * Testicular infarct  s/p Left orchiopexy and Right orchiectomy by scrotal approach, 9/10/2020  prn analgesia  wound care instructions per urology  on IV ancef  urology consult appreciated    * T1DM on insulin pump  -hba1c 8.3; patient will need to follow up Endocrinologist outpatient for better insulin control.   - no reservoir for pump today, ordered humalog subQ and ISS. now wife has brought in reservoir for pump and pt is able to use own pump    * HTN resumed Losartan    *CKD - baseline creat 1.4. monitor     DVT prophy- scds    Dispo: possible d/c home tomorrow.

## 2020-09-12 LAB
ANION GAP SERPL CALC-SCNC: 5 MMOL/L — SIGNIFICANT CHANGE UP (ref 5–17)
BUN SERPL-MCNC: 15 MG/DL — SIGNIFICANT CHANGE UP (ref 7–23)
CALCIUM SERPL-MCNC: 8.5 MG/DL — SIGNIFICANT CHANGE UP (ref 8.5–10.1)
CHLORIDE SERPL-SCNC: 110 MMOL/L — HIGH (ref 96–108)
CO2 SERPL-SCNC: 25 MMOL/L — SIGNIFICANT CHANGE UP (ref 22–31)
CREAT SERPL-MCNC: 1.25 MG/DL — SIGNIFICANT CHANGE UP (ref 0.5–1.3)
GLUCOSE SERPL-MCNC: 169 MG/DL — HIGH (ref 70–99)
POTASSIUM SERPL-MCNC: 4.1 MMOL/L — SIGNIFICANT CHANGE UP (ref 3.5–5.3)
POTASSIUM SERPL-SCNC: 4.1 MMOL/L — SIGNIFICANT CHANGE UP (ref 3.5–5.3)
SODIUM SERPL-SCNC: 140 MMOL/L — SIGNIFICANT CHANGE UP (ref 135–145)

## 2020-09-12 PROCEDURE — 99232 SBSQ HOSP IP/OBS MODERATE 35: CPT

## 2020-09-12 RX ORDER — MINERAL OIL
133 OIL (ML) MISCELLANEOUS ONCE
Refills: 0 | Status: COMPLETED | OUTPATIENT
Start: 2020-09-12 | End: 2020-09-12

## 2020-09-12 RX ORDER — OXYCODONE HYDROCHLORIDE 5 MG/1
7.5 TABLET ORAL EVERY 4 HOURS
Refills: 0 | Status: DISCONTINUED | OUTPATIENT
Start: 2020-09-12 | End: 2020-09-13

## 2020-09-12 RX ORDER — MULTIVIT WITH MIN/MFOLATE/K2 340-15/3 G
1 POWDER (GRAM) ORAL ONCE
Refills: 0 | Status: COMPLETED | OUTPATIENT
Start: 2020-09-12 | End: 2020-09-12

## 2020-09-12 RX ORDER — LACTULOSE 10 G/15ML
10 SOLUTION ORAL ONCE
Refills: 0 | Status: DISCONTINUED | OUTPATIENT
Start: 2020-09-12 | End: 2020-09-12

## 2020-09-12 RX ORDER — CEPHALEXIN 500 MG
500 CAPSULE ORAL EVERY 12 HOURS
Refills: 0 | Status: DISCONTINUED | OUTPATIENT
Start: 2020-09-12 | End: 2020-09-13

## 2020-09-12 RX ORDER — OXYCODONE HYDROCHLORIDE 5 MG/1
10 TABLET ORAL EVERY 4 HOURS
Refills: 0 | Status: DISCONTINUED | OUTPATIENT
Start: 2020-09-12 | End: 2020-09-13

## 2020-09-12 RX ADMIN — Medication 1 BOTTLE: at 15:48

## 2020-09-12 RX ADMIN — BUDESONIDE AND FORMOTEROL FUMARATE DIHYDRATE 2 PUFF(S): 160; 4.5 AEROSOL RESPIRATORY (INHALATION) at 13:14

## 2020-09-12 RX ADMIN — Medication 500 MILLIGRAM(S): at 21:13

## 2020-09-12 RX ADMIN — POLYETHYLENE GLYCOL 3350 17 GRAM(S): 17 POWDER, FOR SOLUTION ORAL at 11:21

## 2020-09-12 RX ADMIN — LOSARTAN POTASSIUM 100 MILLIGRAM(S): 100 TABLET, FILM COATED ORAL at 11:21

## 2020-09-12 RX ADMIN — SENNA PLUS 2 TABLET(S): 8.6 TABLET ORAL at 21:13

## 2020-09-12 RX ADMIN — Medication 1 APPLICATION(S): at 11:21

## 2020-09-12 RX ADMIN — Medication 133 MILLILITER(S): at 22:41

## 2020-09-12 RX ADMIN — Medication 1 APPLICATION(S): at 21:13

## 2020-09-12 RX ADMIN — Medication 81 MILLIGRAM(S): at 11:21

## 2020-09-12 NOTE — PROVIDER CONTACT NOTE (OTHER) - RECOMMENDATIONS
Educated pt on the importance of using the hospital's glucometer for documentation & accuracy purposes as per hospital policy.

## 2020-09-12 NOTE — PROGRESS NOTE ADULT - SUBJECTIVE AND OBJECTIVE BOX
Cheif complaints and Diagnosis: Testicular infarction    Subjective: pain is uncontrolled w/ percocet, says he needed IV dilaudid last night. denies any n/v. c/o constipation. no BM yet.     REVIEW OF SYSTEMS: no chest pain, no dyspnea    T(C): 36.8 (09-12-20 @ 09:00), Max: 37.3 (09-11-20 @ 20:00)  HR: 93 (09-12-20 @ 09:00) (93 - 102)  BP: 140/71 (09-12-20 @ 09:00) (140/71 - 145/60)  RR: 17 (09-12-20 @ 09:00) (17 - 18)  SpO2: 95% (09-12-20 @ 09:00) (95% - 96%)    Gen - Pleasant, cooperative, in no acute distress  HEENT- PERRL, moist mucus membranes, OP clear  CV - RRR, No m/r/g, +pulses, no edema  Lungs - Good effort, Clear to auscultation bilaterally  Abdomen - adiposic, Soft, nontender, nondistended, +BS, No rebound/rigidity/guarding  Ext - No cyanosis/clubbing.   Skin - No rashes,  deferred. No jaundice  Psych- Alert & oriented x 3  Neuro- fluent speech, no facial droop, EOMI. moves all ext    MEDICATIONS  (STANDING):  aspirin  chewable 81 milliGRAM(s) Oral daily  BACItracin   Ointment 1 Application(s) Topical two times a day  budesonide  80 MICROgram(s)/formoterol 4.5 MICROgram(s) Inhaler 2 Puff(s) Inhalation two times a day  cephalexin 500 milliGRAM(s) Oral every 12 hours  dextrose 5%. 1000 milliLiter(s) (50 mL/Hr) IV Continuous <Continuous>  dextrose 50% Injectable 12.5 Gram(s) IV Push once  dextrose 50% Injectable 25 Gram(s) IV Push once  dextrose 50% Injectable 25 Gram(s) IV Push once  influenza   Vaccine 0.5 milliLiter(s) IntraMuscular once  losartan 100 milliGRAM(s) Oral daily  magnesium citrate Oral Solution 1 Bottle Oral once  polyethylene glycol 3350 17 Gram(s) Oral daily  senna 2 Tablet(s) Oral at bedtime  sodium chloride 0.9%. 1000 milliLiter(s) (125 mL/Hr) IV Continuous <Continuous>    MEDICATIONS  (PRN):  aluminum hydroxide/magnesium hydroxide/simethicone Suspension 30 milliLiter(s) Oral every 4 hours PRN Dyspepsia  dextrose 40% Gel 15 Gram(s) Oral once PRN Blood Glucose LESS THAN 70 milliGRAM(s)/deciliter  glucagon  Injectable 1 milliGRAM(s) IntraMuscular once PRN Glucose LESS THAN 70 milligrams/deciliter  HYDROmorphone  Injectable 0.5 milliGRAM(s) IV Push every 6 hours PRN breakthrough pain  ondansetron Injectable 4 milliGRAM(s) IV Push every 6 hours PRN Nausea  oxyCODONE    IR 7.5 milliGRAM(s) Oral every 4 hours PRN Moderate Pain (4 - 6)  oxyCODONE    IR 10 milliGRAM(s) Oral every 4 hours PRN Severe Pain (7 - 10)      LABS: All Labs Reviewed:                        12.2   9.87  )-----------( 292      ( 11 Sep 2020 09:07 )             39.8     09-11    141  |  110<H>  |  16  ----------------------------<  242<H>  4.4   |  27  |  1.45<H>    Ca    8.7      11 Sep 2020 09:07      RADIOLOGY RESULTS:    < from: CT Abdomen and Pelvis w/ IV Cont (09.03.20 @ 08:02) >    IMPRESSION:  Mild right-sided hydrocele and fluid within the right inguinal canal.    < end of copied text >      < from: US Doppler Scrotum (09.09.20 @ 09:51) >  IMPRESSION:    Findings consistent with right testicular infarct likely secondary to torsion.    < end of copied text >      Assessment and Plan:      58 y/o M with PMHx of asthma, DM, GERD, HTN, and s/p appendectomy presents to the ED c/o worsening +R testicular pain. Was seen in ED on 9/3 for same complaint and was d/c on 9/4 to f/u with urologist Dr. Grider. Went to urologist today for increasing R testicular pain and was found to have +infarcts to testicle. Sent for admission, pain management, and medical clearance for procedure to remove R testicle tomorrow. No fever. Allergic to penicillins. PCP: Dr. Victor Manuel Park.    He was dx with DM in 1992, told was type 2 but later on he was labeled as type 1. He is using an automated insulin pump with Humalog, no GAMEZ at bedtime.     * Testicular infarct  s/p Left orchiopexy and Right orchiectomy by scrotal approach, 9/10/2020  prn analgesia, will change to oxy IR 7.5 mg prn moderate pain and 10 mg prn severe pain. will adjust dilaudid IV dose and give for breakthrough only  wound care instructions per urology  s/p IV ancef. now on oral keflex   urology consult appreciated    * T1DM on insulin pump  -hba1c 8.3; patient will need to follow up Endocrinologist outpatient for better insulin control.   - cont insulin via pump    * HTN resumed Losartan    *CKD - baseline creat 1.4. monitor     DVT prophy- scds    Dispo: possible d/c home in 1-2 days when pain is controlled.      Cheif complaints and Diagnosis: Testicular infarction    Subjective: pain is uncontrolled w/ percocet, says he needed IV dilaudid last night. denies any n/v. c/o constipation. no BM yet.     REVIEW OF SYSTEMS: no chest pain, no dyspnea    T(C): 36.8 (09-12-20 @ 09:00), Max: 37.3 (09-11-20 @ 20:00)  HR: 93 (09-12-20 @ 09:00) (93 - 102)  BP: 140/71 (09-12-20 @ 09:00) (140/71 - 145/60)  RR: 17 (09-12-20 @ 09:00) (17 - 18)  SpO2: 95% (09-12-20 @ 09:00) (95% - 96%)    Gen - Pleasant, cooperative, in no acute distress  HEENT- PERRL, moist mucus membranes, OP clear  CV - RRR, No m/r/g, +pulses, no edema  Lungs - Good effort, Clear to auscultation bilaterally  Abdomen - adiposic, Soft, nontender, nondistended, +BS, No rebound/rigidity/guarding  Ext - No cyanosis/clubbing.   Skin - No rashes,  deferred. No jaundice  Psych- Alert & oriented x 3  Neuro- fluent speech, no facial droop, EOMI. moves all ext    MEDICATIONS  (STANDING):  aspirin  chewable 81 milliGRAM(s) Oral daily  BACItracin   Ointment 1 Application(s) Topical two times a day  budesonide  80 MICROgram(s)/formoterol 4.5 MICROgram(s) Inhaler 2 Puff(s) Inhalation two times a day  cephalexin 500 milliGRAM(s) Oral every 12 hours  dextrose 5%. 1000 milliLiter(s) (50 mL/Hr) IV Continuous <Continuous>  dextrose 50% Injectable 12.5 Gram(s) IV Push once  dextrose 50% Injectable 25 Gram(s) IV Push once  dextrose 50% Injectable 25 Gram(s) IV Push once  influenza   Vaccine 0.5 milliLiter(s) IntraMuscular once  losartan 100 milliGRAM(s) Oral daily  magnesium citrate Oral Solution 1 Bottle Oral once  polyethylene glycol 3350 17 Gram(s) Oral daily  senna 2 Tablet(s) Oral at bedtime  sodium chloride 0.9%. 1000 milliLiter(s) (125 mL/Hr) IV Continuous <Continuous>    MEDICATIONS  (PRN):  aluminum hydroxide/magnesium hydroxide/simethicone Suspension 30 milliLiter(s) Oral every 4 hours PRN Dyspepsia  dextrose 40% Gel 15 Gram(s) Oral once PRN Blood Glucose LESS THAN 70 milliGRAM(s)/deciliter  glucagon  Injectable 1 milliGRAM(s) IntraMuscular once PRN Glucose LESS THAN 70 milligrams/deciliter  HYDROmorphone  Injectable 0.5 milliGRAM(s) IV Push every 6 hours PRN breakthrough pain  ondansetron Injectable 4 milliGRAM(s) IV Push every 6 hours PRN Nausea  oxyCODONE    IR 7.5 milliGRAM(s) Oral every 4 hours PRN Moderate Pain (4 - 6)  oxyCODONE    IR 10 milliGRAM(s) Oral every 4 hours PRN Severe Pain (7 - 10)      LABS: All Labs Reviewed:                        12.2   9.87  )-----------( 292      ( 11 Sep 2020 09:07 )             39.8     09-11    141  |  110<H>  |  16  ----------------------------<  242<H>  4.4   |  27  |  1.45<H>    Ca    8.7      11 Sep 2020 09:07      RADIOLOGY RESULTS:    < from: CT Abdomen and Pelvis w/ IV Cont (09.03.20 @ 08:02) >    IMPRESSION:  Mild right-sided hydrocele and fluid within the right inguinal canal.    < end of copied text >      < from: US Doppler Scrotum (09.09.20 @ 09:51) >  IMPRESSION:    Findings consistent with right testicular infarct likely secondary to torsion.    < end of copied text >      Assessment and Plan:      60 y/o M with PMHx of asthma, DM, GERD, HTN, and s/p appendectomy presents to the ED c/o worsening +R testicular pain. Was seen in ED on 9/3 for same complaint and was d/c on 9/4 to f/u with urologist Dr. Grider. Went to urologist today for increasing R testicular pain and was found to have +infarcts to testicle. Sent for admission, pain management, and medical clearance for procedure to remove R testicle tomorrow. No fever. Allergic to penicillins. PCP: Dr. Victor Manuel Park.    He was dx with DM in 1992, told was type 2 but later on he was labeled as type 1. He is using an automated insulin pump with Humalog, no GAMEZ at bedtime.     * Testicular infarct  s/p Left orchiopexy and Right orchiectomy by scrotal approach, 9/10/2020  prn analgesia, will change to oxy IR 7.5 mg prn moderate pain and 10 mg prn severe pain. will adjust dilaudid IV dose and give for breakthrough only  wound care instructions per urology  s/p IV ancef. now on oral keflex   urology consult appreciated    * T1DM on insulin pump  -hba1c 8.3; patient will need to follow up Endocrinologist outpatient for better insulin control.   - cont insulin via pump    * HTN resumed Losartan    * CKD - baseline creat 1.4. monitor     * constipation- cont miralax and senna. will give mag citriate x 1    DVT prophy- scds    Dispo: possible d/c home in 1-2 days when pain is controlled.

## 2020-09-12 NOTE — PROGRESS NOTE ADULT - SUBJECTIVE AND OBJECTIVE BOX
Patient is a 59y old  Male who presents with a chief complaint of testicular infarct (12 Sep 2020 13:55)      Vital Signs Last 24 Hrs  T(C): 37.1 (12 Sep 2020 15:00), Max: 37.3 (11 Sep 2020 20:00)  T(F): 98.8 (12 Sep 2020 15:00), Max: 99.1 (11 Sep 2020 20:00)  HR: 94 (12 Sep 2020 15:00) (93 - 102)  BP: 153/62 (12 Sep 2020 15:00) (140/71 - 153/62)  BP(mean): --  RR: 17 (12 Sep 2020 15:00) (17 - 18)  SpO2: 95% (12 Sep 2020 15:00) (95% - 96%)    LABS:                        12.2   9.87  )-----------( 292      ( 11 Sep 2020 09:07 )             39.8     09-12    140  |  110<H>  |  15  ----------------------------<  169<H>  4.1   |  25  |  1.25    Ca    8.5      12 Sep 2020 07:18                   Patient is a 59y old  Male who presents with a chief complaint of testicular infarct (12 Sep 2020 13:55)  s/p Right orchiectomy and left orchiopexy POD#2  Not having scrotal pain, c/o soreness.    c/o constipation, abd distension and discomfort. Has had Mag Citrate, still no BM    Vital Signs Last 24 Hrs  T(C): 37.1 (12 Sep 2020 15:00), Max: 37.3 (11 Sep 2020 20:00)  T(F): 98.8 (12 Sep 2020 15:00), Max: 99.1 (11 Sep 2020 20:00)  HR: 94 (12 Sep 2020 15:00) (93 - 102)  BP: 153/62 (12 Sep 2020 15:00) (140/71 - 153/62)  BP(mean): --  RR: 17 (12 Sep 2020 15:00) (17 - 18)  SpO2: 95% (12 Sep 2020 15:00) (95% - 96%)  Normal respiratory effort  Normal peripheral circulation  Abdomen- Soft, distended, NT   Incision: C, D, I, scrotal skin bruising and swelling, tender to touch     LABS:                        12.2   9.87  )-----------( 292      ( 11 Sep 2020 09:07 )             39.8     09-12    140  |  110<H>  |  15  ----------------------------<  169<H>  4.1   |  25  |  1.25    Ca    8.5      12 Sep 2020 07:18

## 2020-09-12 NOTE — PROGRESS NOTE ADULT - ASSESSMENT
Continue medical therapy.   All meds PO.  Possible CDC tomorrow.   Marcelo's enema.  Possible CDC tomorrow.   Fleet's enema.  Continue scrotal support.  Bacitracin oint BID.  Possible DC tomorrow.  Being followed by medicine for medical issues.   Follow up in office in 1 week.

## 2020-09-12 NOTE — PROVIDER CONTACT NOTE (OTHER) - BACKGROUND
Pt has own insulin pump & glucose monitoring device, per night RN, we have been monitoring glucose & insulin coverage via pt's own devices.

## 2020-09-12 NOTE — PROVIDER CONTACT NOTE (OTHER) - ASSESSMENT
Overnight RN stated there was an order, but unable to locate. Pt's FS & insulin for 09/12 are as follows: Breakfast 171=5.1 units, Lunch 161=10 units, & Dinner 161=10.4 units. Results discussed w/ Dr. Wynn & is aware.

## 2020-09-12 NOTE — PROVIDER CONTACT NOTE (OTHER) - ACTION/TREATMENT ORDERED:
MD will order sliding scale.
Clarified w/ Dr. Wynn & stated ok to use pt's own insulin pump for coverage but we should still check pt's fingersticks w/ our own devices. Will let night RN know. Charge RN made aware. WCTM.

## 2020-09-12 NOTE — PROGRESS NOTE ADULT - REASON FOR ADMISSION
testicular infarct

## 2020-09-13 ENCOUNTER — TRANSCRIPTION ENCOUNTER (OUTPATIENT)
Age: 60
End: 2020-09-13

## 2020-09-13 VITALS
TEMPERATURE: 98 F | HEART RATE: 88 BPM | OXYGEN SATURATION: 93 % | SYSTOLIC BLOOD PRESSURE: 148 MMHG | DIASTOLIC BLOOD PRESSURE: 74 MMHG | RESPIRATION RATE: 18 BRPM

## 2020-09-13 PROCEDURE — 99239 HOSP IP/OBS DSCHRG MGMT >30: CPT

## 2020-09-13 RX ORDER — OXYCODONE HYDROCHLORIDE 5 MG/1
1 TABLET ORAL
Qty: 10 | Refills: 0
Start: 2020-09-13

## 2020-09-13 RX ORDER — POLYETHYLENE GLYCOL 3350 17 G/17G
17 POWDER, FOR SOLUTION ORAL
Qty: 510 | Refills: 0
Start: 2020-09-13 | End: 2020-10-12

## 2020-09-13 RX ORDER — CEPHALEXIN 500 MG
1 CAPSULE ORAL
Qty: 10 | Refills: 0
Start: 2020-09-13 | End: 2020-09-17

## 2020-09-13 RX ORDER — BACITRACIN ZINC 500 UNIT/G
1 OINTMENT IN PACKET (EA) TOPICAL
Qty: 28.35 | Refills: 0
Start: 2020-09-13

## 2020-09-13 RX ADMIN — Medication 1 APPLICATION(S): at 09:15

## 2020-09-13 RX ADMIN — Medication 81 MILLIGRAM(S): at 09:19

## 2020-09-13 RX ADMIN — LOSARTAN POTASSIUM 100 MILLIGRAM(S): 100 TABLET, FILM COATED ORAL at 09:18

## 2020-09-13 RX ADMIN — POLYETHYLENE GLYCOL 3350 17 GRAM(S): 17 POWDER, FOR SOLUTION ORAL at 09:21

## 2020-09-13 RX ADMIN — Medication 500 MILLIGRAM(S): at 09:18

## 2020-09-13 NOTE — DISCHARGE NOTE PROVIDER - NSDCCPCAREPLAN_GEN_ALL_CORE_FT
PRINCIPAL DISCHARGE DIAGNOSIS  Diagnosis: Testicular infarct, right  Assessment and Plan of Treatment: you had Left orchiopexy and Right orchiectomy by scrotal approach, 9/10/2020  Continue scrotal support.  Bacitracin ointment twice a day and oral antibiotic twice a day. prescriptions sent to your pharmacy.  you may shower. do not scrub the surgical area.   do NOT soak in bathtub.  if you see any signs of infection such as worsening redness, bleeding, pus or drainage or if you have fever of 100.4 or higher then call your doctors right away or return to emergency room      SECONDARY DISCHARGE DIAGNOSES  Diagnosis: Diabetes  Assessment and Plan of Treatment: your A1c is high. please follow up with your endocrinologist

## 2020-09-13 NOTE — DISCHARGE NOTE PROVIDER - HOSPITAL COURSE
Assessment and Plan:      58 y/o M with PMHx of asthma, DM, GERD, HTN, and s/p appendectomy presents to the ED c/o worsening +R testicular pain. Was seen in ED on 9/3 for same complaint and was d/c on 9/4 to f/u with urologist Dr. Grider. Went to urologist today for increasing R testicular pain and was found to have +infarcts to testicle. Sent for admission, pain management, and medical clearance for procedure to remove R testicle tomorrow. No fever.     He was dx with DM in 1992, told was type 2 but later on he was labeled as type 1. He is using an automated insulin pump with Humalog, no GAMEZ at bedtime.     * Testicular infarct  s/p Left orchiopexy and Right orchiectomy by scrotal approach, 9/10/2020  prn analgesia  wound care instructions per urology  s/p IV ancef. now on oral keflex for 5 more days  urology consult appreciated.  ok to d/c home today per urology    * T1DM on insulin pump  -hba1c 8.3; patient will need to follow up Endocrinologist outpatient for better insulin control.   - cont insulin via pump    * HTN resumed Losartan    * CKD - baseline creat 1.4. monitor     * constipation- bowel regimen    T(C): 36.9 (09-13-20 @ 08:22), Max: 37.1 (09-12-20 @ 15:00)  HR: 88 (09-13-20 @ 08:22) (88 - 95)  BP: 148/74 (09-13-20 @ 08:22) (148/74 - 157/74)  RR: 18 (09-13-20 @ 08:22) (17 - 18)  SpO2: 93% (09-13-20 @ 08:22) (93% - 96%)    Gen - Pleasant, cooperative, in no acute distress  HEENT- PERRL, moist mucus membranes, OP clear  CV - RRR, No m/r/g, +pulses, no edema  Lungs - Good effort, Clear to auscultation bilaterally  Abdomen - adiposic, Soft, nontender, nondistended, +BS, No rebound/rigidity/guarding  Ext - No cyanosis/clubbing.   Skin - No rashes,  deferred. No jaundice  Psych- Alert & oriented x 3  Neuro- fluent speech, no facial droop, EOMI. moves all ext    Dispo: discharge to home in stable condition    Final diagnosis, treatment plan, and follow-up recommendations were discussed and explained to the patient. The patient was given an opportunity to ask questions concerning the diagnosis and treatment plan. The patient acknowledged understanding of the diagnosis, treatment, and follow-up recommendations. The patient was advised to seek urgent care upon discharge if worsening symptoms develop prior to scheduled follow-up. Time spent on discharge included time with the patient, and also coordinating discharge care as outlined below.    Total time spent: 50 min

## 2020-09-13 NOTE — DISCHARGE NOTE NURSING/CASE MANAGEMENT/SOCIAL WORK - PATIENT PORTAL LINK FT
You can access the FollowMyHealth Patient Portal offered by [facility] by registering at the following website: [email or web address]. By joining [email or web address]’s FollowMyHealth portal, you will also be able to view your health information using other applications (apps) compatible with our system.

## 2020-09-13 NOTE — DISCHARGE NOTE PROVIDER - CARE PROVIDER_API CALL
José Grider  UROLOGY  [address], [address] [place] [address]  Phone: [phone]  Fax: [phone]  Follow Up Time: 1 week    Victor Manuel Park)  Cardiology; Internal Medicine  [address], [address]  [place] [address]  Phone: [phone]  Fax: [phone]  Follow Up Time: 1 week    your endocrinologist,   Phone: (   )    -  Fax: (   )    -  Follow Up Time:

## 2020-09-13 NOTE — DISCHARGE NOTE PROVIDER - PROVIDER TOKENS
PROVIDER:[TOKEN:[[id number]],FOLLOWUP:[1 week]],PROVIDER:[TOKEN:[[id number]],FOLLOWUP:[1 week]],FREE:[LAST:[your endocrinologist],PHONE:[(   )    -],FAX:[(   )    -]]

## 2020-09-13 NOTE — DISCHARGE NOTE PROVIDER - NSDCMRMEDTOKEN_GEN_ALL_CORE_FT
aspirin 81 mg oral tablet: 1 tab(s) orally once a day  bacitracin 500 units/g topical ointment: 1 application topically 2 times a day  cephalexin 500 mg oral capsule: 1 cap(s) orally every 12 hours  CoQ10: 100 milligram(s) orally once a day  Dulera 200 mcg-5 mcg/inh inhalation aerosol: 2 puff(s) inhaled 2 times a day  famotidine 40 mg oral tablet: 1 tab(s) orally once a day (at bedtime)  HumaLOG Insulin Pump: Carb Ratio: 6   ISF: 24mg/dL  BG Target: 100  Max bolus: 25 units  Max Basal: 5 units/hr  losartan 100 mg oral tablet: 1 tab(s) orally once a day  oxyCODONE 5 mg oral tablet: 1 tab(s) orally every 4 hours, As Needed -for severe pain MDD:25 mg  polyethylene glycol 3350 oral powder for reconstitution: 17 gram(s) orally once a day  Tylenol 500 mg oral tablet: 2 tab(s) orally every 6 hours, As Needed for pain  Vitamin B6 100 mg oral tablet: 1 tab(s) orally once a day  Zetia 10 mg oral tablet: 1 tab(s) orally once a day

## 2020-09-13 NOTE — DISCHARGE NOTE PROVIDER - CARE PROVIDERS DIRECT ADDRESSES
,[email or web address]

## 2020-09-16 DIAGNOSIS — E10.22 TYPE 1 DIABETES MELLITUS WITH DIABETIC CHRONIC KIDNEY DISEASE: ICD-10-CM

## 2020-09-16 DIAGNOSIS — Z96.41 PRESENCE OF INSULIN PUMP (EXTERNAL) (INTERNAL): ICD-10-CM

## 2020-09-16 DIAGNOSIS — J45.909 UNSPECIFIED ASTHMA, UNCOMPLICATED: ICD-10-CM

## 2020-09-16 DIAGNOSIS — I12.9 HYPERTENSIVE CHRONIC KIDNEY DISEASE WITH STAGE 1 THROUGH STAGE 4 CHRONIC KIDNEY DISEASE, OR UNSPECIFIED CHRONIC KIDNEY DISEASE: ICD-10-CM

## 2020-09-16 DIAGNOSIS — Z88.0 ALLERGY STATUS TO PENICILLIN: ICD-10-CM

## 2020-09-16 DIAGNOSIS — N18.9 CHRONIC KIDNEY DISEASE, UNSPECIFIED: ICD-10-CM

## 2020-09-16 DIAGNOSIS — E10.9 TYPE 1 DIABETES MELLITUS WITHOUT COMPLICATIONS: ICD-10-CM

## 2020-09-16 DIAGNOSIS — N44.00 TORSION OF TESTIS, UNSPECIFIED: ICD-10-CM

## 2020-09-16 DIAGNOSIS — N50.1 VASCULAR DISORDERS OF MALE GENITAL ORGANS: ICD-10-CM

## 2020-09-18 ENCOUNTER — APPOINTMENT (OUTPATIENT)
Dept: UROLOGY | Facility: CLINIC | Age: 60
End: 2020-09-18
Payer: COMMERCIAL

## 2020-09-18 VITALS — TEMPERATURE: 97.5 F

## 2020-09-18 DIAGNOSIS — N50.1 VASCULAR DISORDERS OF MALE GENITAL ORGANS: ICD-10-CM

## 2020-09-18 PROCEDURE — 99024 POSTOP FOLLOW-UP VISIT: CPT

## 2020-09-24 PROBLEM — N50.1 TESTICULAR INFARCT, RIGHT: Status: ACTIVE | Noted: 2020-09-09

## 2020-09-24 NOTE — ASSESSMENT
[FreeTextEntry1] : Testicular infarct:\par Maintain local hygiene. \par Continue scrotal support.\par Ibuprofen. \par \par Return to office in 4 weeks or sooner if any issues.

## 2020-09-24 NOTE — PHYSICAL EXAM
[General Appearance - Well Developed] : well developed [Normal Appearance] : normal appearance [General Appearance - In No Acute Distress] : no acute distress [Abdomen Soft] : soft [Abdomen Tenderness] : non-tender [FreeTextEntry1] : normal peripheral circulation  [] : no respiratory distress [Exaggerated Use Of Accessory Muscles For Inspiration] : no accessory muscle use [Oriented To Time, Place, And Person] : oriented to person, place, and time

## 2020-09-24 NOTE — HISTORY OF PRESENT ILLNESS
[FreeTextEntry1] : 60 yo male presents for follow up. \par Complaining of discomfort on right side. \par Denies any discharge from incision, fever, chills or rigors. \par Status post right orchiectomy and left orchiopexy done on 9/10/20 at [facility] for right testicular infarct. \par \par Initially seen at  for right testicular pain and right lower abdominal pain. \par Pt was treated from Epididymitis and sent home on Levaquin and pain medicine. \par Seen at office subsequently with worsening right testis pain and swelling. \par Stat Scrotal Ultrasound obtained as out patient showed right testicular infarct.\par \par Pt states his testicular pain started ~12 hours earlier prior to going to ED and then radiated to inguinal area and abdomen. Scrotal US in ED: right epididymitis with normal blood flow. CT abd/pel showed right hydrocele and fluid in right inguinal canal. \par

## 2020-10-02 ENCOUNTER — TRANSCRIPTION ENCOUNTER (OUTPATIENT)
Age: 60
End: 2020-10-02

## 2020-10-13 NOTE — ED ADULT NURSE NOTE - PAIN RATING/NUMBER SCALE (0-10): REST
Patient: Raleigh Gonzalez Date: 10/13/2020   : 1942    78 year old male      OUTPATIENT WOUND CARE PROGRESS NOTE    Supervising Wound Care / Hyperbaric Medicine Physician: Not Applicable  Consulting Provider:  OBED Oshea  Date of Consultation/Last Comprehensive Exam:  16  Referring  Provider:  Tej Calvert MD    SUBJECTIVE:    Chief Complaint:  Leg wounds      Wound/Ulcer Present:    Venous leg ulcer:  Current Vascular Assessment:  Physical exam.     Current Compression Therapy:  Stockings      Additional Wound Category:  None     Maximum Baseline Ambulatory Status:  Ambulates unassisted    History of Present Illness:  This is a 78 year old male well known to wound care for venous ulcerations to his lower legs since . He has had recurrent leg ulcers and has tried various treatment modalities and compression wraps and his wounds improve, but recur because patient does not wear continuous compression therapy nor elevate his legs routinely. He completed laser therapy treatments with Dr. Harrington late 2017. Transitioned from wraps to velcro compression stockings 18 when wounds were healed.    Interval Note 10/13/20: Patient here in follow-up for recurrent ulcers to his lower legs. No issues with Laurence to wound and Visco/Profore lite wrap to the right lower leg. Receiving home care weekly. Juxtalite to LLE with no open wounds. No fevers, chills, nausea, diarrhea or appetite changes. Admits to not elevating his legs much.     Current Treatment Regimen:  Dressing:  Compression visco/profore lite and xeroform gauze   Frequency:  Weekly   Changed by:  Home care agency nurse    Review of Systems: Pertinent items are noted in HPI.      Past Medical History:   Diagnosis Date   • Adrenal tumor 3/12/2015    2 cm left adrenal adenoma versus myelolipoma stable compared to CT  in , as well as  and    • Basal cell carcinoma of left forearm 2017   • Cancer of skin    •  Colon cancer (CMS/HCC)    • Glaucoma    • HTN (hypertension)    • Hypogonadotropic hypogonadism (CMS/HCC) 3/13/2015    Dx 3/2015, no tx at this time   • Hyponatremia 2017   • Nocturnal hypoxemia due to pulmonary hypertension (CMS/HCC) 2015    O/n O2 rec in 2014. Pt declined.   • Pituitary tumor    • Pulmonary embolism (CMS/HCC) 2013   • Pulmonary hypertension (CMS/HCC)    • PVD (peripheral vascular disease) (CMS/HCC) 11/10/2016   • TIA (transient ischemic attack)    • Ulcer    • Urinary incontinence    • Venous stasis of lower extremity 2013   • Venous stasis ulcers (CMS/HCC) 2013     Past Surgical History:   Procedure Laterality Date   • Basal cell carcinoma excision Left 2017    excision left forearm.    • Colonoscopy diagnostic  2012   • Dexa bone density axial skeleton  2011   • Esophagogastroduodenoscopy transoral flex w/bx single or mult  2011        • Flexible sigmoidoscopy bx  10/28/2009   • Sigmoidectomy  10/28/2009     Social History     Socioeconomic History   • Marital status: Single     Spouse name: Not on file   • Number of children: Not on file   • Years of education: Not on file   • Highest education level: Not on file   Occupational History   • Not on file   Social Needs   • Financial resource strain: Not on file   • Food insecurity     Worry: Not on file     Inability: Not on file   • Transportation needs     Medical: Not on file     Non-medical: Not on file   Tobacco Use   • Smoking status: Former Smoker     Packs/day: 0.25     Years: 14.00     Pack years: 3.50     Types: Cigarettes     Quit date: 1974     Years since quittin.8   • Smokeless tobacco: Never Used   Substance and Sexual Activity   • Alcohol use: No     Alcohol/week: 0.0 standard drinks     Frequency: Monthly or less     Drinks per session: 1 or 2     Binge frequency: Never   • Drug use: No   • Sexual activity: Not Currently   Lifestyle   • Physical activity     Days  per week: Not on file     Minutes per session: Not on file   • Stress: Not on file   Relationships   • Social connections     Talks on phone: Not on file     Gets together: Not on file     Attends Anabaptism service: Not on file     Active member of club or organization: Not on file     Attends meetings of clubs or organizations: Not on file     Relationship status: Not on file   • Intimate partner violence     Fear of current or ex partner: Not on file     Emotionally abused: Not on file     Physically abused: Not on file     Forced sexual activity: Not on file   Other Topics Concern   •  Service No   • Blood Transfusions Not Asked   • Caffeine Concern No   • Occupational Exposure Not Asked   • Hobby Hazards Not Asked   • Sleep Concern Not Asked   • Stress Concern Not Asked   • Weight Concern Not Asked   • Special Diet Yes     Comment: He has meals cooked for him.    • Back Care Not Asked   • Exercise No     Comment: laps in the house   • Bike Helmet Not Asked   • Seat Belt Yes   • Self-Exams Not Asked   Social History Narrative    10/11/19-He is single. 0 children. [place] farm, went to the nursing home, he was at a [facility], then moved in with friends. He has his own bedroom. They provide meals. He walks with a cane. He doesn't drive. He is home alone on occasion. He does have life alert. There is a shower without grab rails. No cell phone.      Family History   Problem Relation Age of Onset   • Diabetes Mother    • Dementia/Alzheimers Father        Current Outpatient Medications   Medication Sig   • furosemide (LASIX) 40 MG tablet Take 1 tablet by mouth every morning AND ONE-HALF tablets every evening.   • warfarin (COUMADIN) 5 MG tablet Take 1 tablet by mouth daily.   • fluticasone (FLONASE) 50 MCG/ACT nasal spray Spray 2 sprays in each nostril daily.   • omeprazole (PrilOSEC) 20 MG capsule Take 1 capsule by mouth daily.   • potassium CHLORIDE (KLOR-CON M) 20 MEQ janie ER tablet Take 1 tablet by mouth 2  times daily.   • folic acid (FOLVITE) 800 MCG tablet Take 1 tablet by mouth daily.   • loperamide (IMODIUM) 2 MG capsule Take 1 capsule by mouth 4 times daily as needed for Diarrhea.   • CHOLECALCIFEROL PO Take 1,000 Units by mouth daily. Unsure of dose/OTC    • zoster vaccine recomb adjuvanted (SHINGRIX) 50 MCG/0.5ML injection Repeat dose in 2 to 6 months (unless 1 dose already given), for a total of 2 doses.   • IRON PO Take 325 mg by mouth daily.    • polyethylene glycol (GLYCOLAX, MIRALAX) packet Take 17 g by mouth daily as needed (for constipation).    • acetaminophen (TYLENOL) 500 MG tablet Take 500 mg by mouth 2 times daily as needed for Pain.   • DISPENSE Tubi  for bilat LE edema, with rec ulcerations   • Elastic Bandages & Supports (MEDICAL COMPRESSION STOCKINGS) MISC 2 each daily.     No current facility-administered medications for this encounter.      Facility-Administered Medications Ordered in Other Encounters   Medication   • gadobutrol (GADAVIST) injection 12 mL        ALLERGIES:  Patient has no known allergies.    OBJECTIVE:  Vital Signs:    Visit Vitals  /78 (BP [facility]: LFA - Left forearm, Patient Position: Sitting)   Pulse 87   Temp 97.8 °F (36.6 °C) (Temporal)   Resp 18       Physical Exam:  General appearance: Appears stated age, Alert, oriented to person, place, time and situation, in no distress and cooperative  Head:   normocephalic without obvious abnormality  Mouth:   moist  Pulmonary: normal respiratory effort     BLE with heavy hemosiderin staining and fibrotic skin changes with plaquing and controlled lymphedema. Feet are warm. Palpable DP pulses bilaterally. LLE not assessed today due to patient report of no open wounds or weeping.    Right lateral lower leg with a pinpoint open granular wound surrounded by thierno-epithelium. No drainage or purulence. Evelyn area intact with no erythema or warmth.       Wound Bed Quality:  Granulation tissue      Evelyn-wound Quality:     Intact    Additional Descriptors:  none    Wound Measurements Per Flowsheet:     Wound Leg Right Lower Venous Ulcer (Active)   Number of days: 232       Wound Leg Right Lateral (Active)   Wound Care Team Consult Date 07/08/20 07/08/20 0853   Wound Length (cm) 0 cm 09/01/20 0918   Wound Width (cm) 0 cm 09/01/20 0918   Wound Depth (cm) 0 cm 09/01/20 0918   Wound Surface Area (cm^2) 0 cm^2 09/01/20 0918   Wound Volume (cm^3) 0 cm^3 09/01/20 0918   Wound Volume Change (Initial) -0.09 cm3 09/01/20 0918   Wound Volume % Change (Initial) -100 % 09/01/20 0918   Wound Volume Change (30 days) -0.02 cm3 09/01/20 0918   Wound Volume % Change (30 days) -100 % 09/01/20 0918   Number of days: 97       PROCEDURE:  Not indicated    Procedure was Performed by:  Not applicable    Laboratory assessments reviewed:No results found for: PAB   Albumin (g/dL)   Date Value   06/24/2020 3.6   01/30/2020 2.4 (L)   01/27/2020 2.7 (L)   10/04/2019 3.5 (L)      No results available in last 24 hours    Lab Results   Component Value Date    WBC 6.7 06/24/2020    GLUCOSE 87 06/24/2020    RESR 32 (H) 07/22/2013    CREATININE 1.12 06/24/2020    GFRA 63 01/28/2020    GFRNA 59.6 (L) 02/10/2020        Culture results:  Specimen Description (no units)   Date Value   01/28/2020 STOOL STOOL   01/27/2020 BLOOD, PERIPHERAL ARM, LEFT   01/27/2020 BLOOD, PERIPHERAL WRIST, RIGHT   10/16/2018 SWAB TOE LEFT DISTAL SECOND DIGIT    CULTURE (no units)   Date Value   01/31/2020     Rare Normal enteric arley isolated; Shiga Toxin 1 : Negative Shiga Toxin 2 : Negative; Negative for Salmonella, Shigella, Vibrio, Yersinia, Campylobacter, Eschericia Garry 0157:H7, Aeromonas and Plesiomonas.   01/28/2020     NEGATIVE FOR SALMONELLA,SHIGELLA,AEROMONAS,AND PLESIOMONAS   01/28/2020     Normal fecal arley absent, suggesting antimicrobial inhibition.  If clinically indicated, consider ordering a test for C. difficile toxin.   01/28/2020 NEGATIVE FOR CAMPYLOBACTER BY EIA.    2020 NEGATIVE FOR SHIGA TOXIN BY EIA.        Diagnostic Assessments Reviewed:  No new update    Nutritional Assessment:  Prealbumin and/or Albumin reviewed    Wound treatment goals are palliative:  No    DIAGNOSES:  Lymphedema bilateral lower extremities  Venous insufficiency ulcer, chronic, lower extremity right lateral & left anterior    Venous insufficiency, chronic bilateral lower extremities     Medical Decision Makin year old man known to wound care for lower extremity wounds related to venous insufficiency and phlebolymphedema. Wounds healed 18. Patient transitioned to velcro compression stockings 18 and chronic ulcers remain healed. Superficial granular ulcer to right lateral lower leg slowly improving. Left leg abrasion remains healed. No drainage or signs of infection.     Local wound care (BLE):  - weekly and as needed for drainage/tolerance  - cleanse with mild soap/water, rinse and pat dry  - Aquaphor or similar moisturizer to dry skin/plaquing, silicone foam border to right lower leg small ulcer    Compression:  - Viscopaste/Profore lite wrap to RLE, Juxtalite stocking to LLE  - Patient to elevate his bilateral lower extremities as often as able to aid in chronic edema control    Dressings/wraps to be changed per home care RN.    Nutrition:  - increase oral protein to promote wound healing.     Maintenance plan:  - will need long-term lymphedema management with compression therapy and leg elevation  - patient has declined lymphedema pumps or the compression previously offered by OT  - patient cannot don stockings, but he is able to don tubular and velcro stockings, now has new Juxtalite stockings    Follow-up in 2-3 weeks.     Plan of Care:  Advanced Wound Care Recommendations:  See above  Percent Wound Closure from consult:  See measurements  Care plan to augment wound closure:  Not applicable.  See above    OBED Oshea   5

## 2020-10-29 ENCOUNTER — OUTPATIENT (OUTPATIENT)
Dept: OUTPATIENT SERVICES | Facility: HOSPITAL | Age: 60
LOS: 1 days | End: 2020-10-29
Payer: SELF-PAY

## 2020-10-29 ENCOUNTER — APPOINTMENT (OUTPATIENT)
Dept: CT IMAGING | Facility: CLINIC | Age: 60
End: 2020-10-29
Payer: SELF-PAY

## 2020-10-29 ENCOUNTER — RESULT REVIEW (OUTPATIENT)
Age: 60
End: 2020-10-29

## 2020-10-29 DIAGNOSIS — Z90.49 ACQUIRED ABSENCE OF OTHER SPECIFIED PARTS OF DIGESTIVE TRACT: Chronic | ICD-10-CM

## 2020-10-29 DIAGNOSIS — E78.5 HYPERLIPIDEMIA, UNSPECIFIED: ICD-10-CM

## 2020-10-29 PROCEDURE — 75571 CT HRT W/O DYE W/CA TEST: CPT | Mod: 26

## 2020-10-29 PROCEDURE — 75571 CT HRT W/O DYE W/CA TEST: CPT

## 2020-10-30 ENCOUNTER — APPOINTMENT (OUTPATIENT)
Dept: ULTRASOUND IMAGING | Facility: CLINIC | Age: 60
End: 2020-10-30
Payer: COMMERCIAL

## 2020-10-30 ENCOUNTER — APPOINTMENT (OUTPATIENT)
Dept: UROLOGY | Facility: CLINIC | Age: 60
End: 2020-10-30
Payer: COMMERCIAL

## 2020-10-30 ENCOUNTER — OUTPATIENT (OUTPATIENT)
Dept: OUTPATIENT SERVICES | Facility: HOSPITAL | Age: 60
LOS: 1 days | End: 2020-10-30
Payer: COMMERCIAL

## 2020-10-30 DIAGNOSIS — N50.812 LEFT TESTICULAR PAIN: ICD-10-CM

## 2020-10-30 DIAGNOSIS — Z90.79 ACQUIRED ABSENCE OF OTHER GENITAL ORGAN(S): ICD-10-CM

## 2020-10-30 DIAGNOSIS — Z90.49 ACQUIRED ABSENCE OF OTHER SPECIFIED PARTS OF DIGESTIVE TRACT: Chronic | ICD-10-CM

## 2020-10-30 PROCEDURE — 93975 VASCULAR STUDY: CPT

## 2020-10-30 PROCEDURE — 99024 POSTOP FOLLOW-UP VISIT: CPT

## 2020-10-30 PROCEDURE — 93975 VASCULAR STUDY: CPT | Mod: 26

## 2020-11-03 ENCOUNTER — NON-APPOINTMENT (OUTPATIENT)
Age: 60
End: 2020-11-03

## 2020-11-09 NOTE — HISTORY OF PRESENT ILLNESS
[FreeTextEntry1] : 59 yo male presents for follow up. \par Complaining of left testis discomfort- burning, last for few minutes in a day.\par \par Status post right orchiectomy and left orchiopexy done on 9/10/20 at [facility] for right testicular infarct. \par \par Initially seen at  for right testicular pain and right lower abdominal pain. \par Pt was treated from Epididymitis and sent home on Levaquin and pain medicine. \par Seen at office subsequently with worsening right testis pain and swelling. \par Stat Scrotal Ultrasound obtained as out patient showed right testicular infarct.\par \par Pt states his testicular pain started ~12 hours earlier prior to going to ED and then radiated to inguinal area and abdomen. Scrotal US in ED: right epididymitis with normal blood flow. CT abd/pel showed right hydrocele and fluid in right inguinal canal. \par

## 2020-11-09 NOTE — ASSESSMENT
[FreeTextEntry1] : Left testis pain:\par Status post Orchiectomy:\par \par Scrotal Ultrasound Will inform results. \par \par Return to office as needed.

## 2020-12-22 NOTE — PROGRESS NOTE ADULT - PROVIDER SPECIALTY LIST ADULT
Hospitalist Patient requesting strep test results done in Urgent Care today. Please call to advise.

## 2021-03-19 ENCOUNTER — APPOINTMENT (OUTPATIENT)
Dept: SURGERY | Facility: CLINIC | Age: 61
End: 2021-03-19
Payer: COMMERCIAL

## 2021-03-19 PROCEDURE — 99072 ADDL SUPL MATRL&STAF TM PHE: CPT

## 2021-03-19 PROCEDURE — 99214 OFFICE O/P EST MOD 30 MIN: CPT

## 2021-03-19 RX ORDER — MAG/ALUMINUM/SOD BICARB/ALGINC 40-160MG
TABLET,CHEWABLE ORAL
Refills: 0 | Status: DISCONTINUED | COMMUNITY
End: 2021-03-19

## 2021-03-19 NOTE — ASSESSMENT
[FreeTextEntry1] : Patient either has an acute fissure or he is developing a recurrent posterior abscess.  Therefore I prescribed Cipro and Flagyl for the possible abscess development and nitroglycerin for the possible acute fissure.  Patient knows that he will have to go to the emergency room over the weekend if his pain becomes severe.  If his pain does not resolve he will be seen next week in the office.  If his pain is improving he will see me in the office in 2 weeks.

## 2021-03-19 NOTE — HISTORY OF PRESENT ILLNESS
[FreeTextEntry1] : Karthikeyan is a 61 y/o male with a hx of right posterior ischiorectal abscess which spontaneously drained. He was last seen 2/06/20- Complete healing. No evidence of fistula. Risk of recurrence reviewed. Followup p.r.n. Today, patient reports rectal pain that started last night. Denies fevers or chills. Has normal formed BM daily. Pain after hard BM.  Some improvement today

## 2021-03-19 NOTE — PHYSICAL EXAM
[FreeTextEntry1] : Perianal inspection no swelling or erythema.  A limited digital exam revealed posterior anal canal tenderness.  Anoscopy could not be performed because of pain

## 2021-03-22 ENCOUNTER — LABORATORY RESULT (OUTPATIENT)
Age: 61
End: 2021-03-22

## 2021-03-22 ENCOUNTER — APPOINTMENT (OUTPATIENT)
Dept: SURGERY | Facility: CLINIC | Age: 61
End: 2021-03-22
Payer: COMMERCIAL

## 2021-03-22 ENCOUNTER — RESULT REVIEW (OUTPATIENT)
Age: 61
End: 2021-03-22

## 2021-03-22 VITALS
SYSTOLIC BLOOD PRESSURE: 177 MMHG | TEMPERATURE: 98.2 F | RESPIRATION RATE: 18 BRPM | HEART RATE: 97 BPM | OXYGEN SATURATION: 97 % | DIASTOLIC BLOOD PRESSURE: 64 MMHG

## 2021-03-22 DIAGNOSIS — K61.39 OTHER ISCHIORECTAL ABSCESS: ICD-10-CM

## 2021-03-22 PROCEDURE — 99214 OFFICE O/P EST MOD 30 MIN: CPT | Mod: 25

## 2021-03-22 PROCEDURE — 46040 I&D ISCHIORCT&/PERIRCT ABSC: CPT

## 2021-03-22 PROCEDURE — 99072 ADDL SUPL MATRL&STAF TM PHE: CPT

## 2021-03-22 PROCEDURE — 17250 CHEM CAUT OF GRANLTJ TISSUE: CPT

## 2021-03-22 PROCEDURE — 12021 TX SUPFC WND DEHSN W/PACKING: CPT | Mod: 59

## 2021-03-22 RX ORDER — MOMETASONE FUROATE AND FORMOTEROL FUMARATE DIHYDRATE 200; 5 UG/1; UG/1
200-5 AEROSOL RESPIRATORY (INHALATION)
Qty: 39 | Refills: 0 | Status: ACTIVE | COMMUNITY
Start: 2020-03-01

## 2021-03-22 RX ORDER — INSULIN LISPRO 100 [IU]/ML
100 INJECTION, SOLUTION INTRAVENOUS; SUBCUTANEOUS
Qty: 110 | Refills: 0 | Status: ACTIVE | COMMUNITY
Start: 2020-09-08

## 2021-03-22 RX ORDER — CIPROFLOXACIN AND DEXAMETHASONE 3; 1 MG/ML; MG/ML
0.3-0.1 SUSPENSION/ DROPS AURICULAR (OTIC)
Qty: 8 | Refills: 0 | Status: DISCONTINUED | COMMUNITY
Start: 2021-02-03 | End: 2021-03-22

## 2021-03-22 NOTE — PROCEDURE
[FreeTextEntry1] : I recommended I&D of the ischiorectal abscess, explained details, risks, benefits, alternatives. Patient wished to proceed. \par Under local anesthesia, the left posterolateral ischiorectal abscess was I&D'ed and an elliptical segment of skin was excised.  There was no return of pus.  Upon attempt for exploration of the deeper tissues the patient was very uncomfortable with any manipulation including local anesthesia injection; therefore the procedure was terminated at that time.  Cx were taken. The wound was irrigated, and treated with Monsel's solution for hemostasis which was adequate at the end. The wound was packed. The patient tolerated the procedure well.\par

## 2021-03-22 NOTE — PHYSICAL EXAM
[FreeTextEntry1] : This is a 60-year-old obese male, uncomfortable.\par \par Examination of the perineum reveals a left posterolateral tender and swollen area that extends radially for 5 cm from the anal verge.  The center of this area is indurated.  There is no obvious wound that is draining. \par Digital rectal examination not done given discomfort.\par Picture consistent with recurrent left posterolateral ischiorectal abscess.\par \par Psychiatric-oriented to time place and person. Good understanding of conversation.

## 2021-03-22 NOTE — HISTORY OF PRESENT ILLNESS
[FreeTextEntry1] : Karthikeyan is a 59 y/o male here for an acute visit for perianal pain and swelling.  \par The pt has diabetes and h/o a left posterior ischiorectal abscess in January 2020 which spontaneously drained, he was evaluated by Dr. Gaffney. He was last seen 2/06/20 and had no issues at the time.\par He was the seen by Dr. Gaffney on 03/19/21 with 1 day of perianal pain, the exam was limited due to pain and it was unclear if the patient had an acute fissure or a recurrent posterior abscess. The pt was prescribed Cipro and Flagyl and nitroglycerin ointment. \par The patient called the office this morning with complaints of perianal swelling and pain that worsened over the weekend.  Just prior to his office visit today he developed some bloody and purulent discharge from the area.  He reports this is the same area as the abscess in January 2020.  Remains on abx. Is having normal formed BMs.  Denies fevers or chills.

## 2021-03-22 NOTE — ASSESSMENT
[FreeTextEntry1] : 60-year-old male with a recurrent left posterolateral abscess drained in the office today.  It is unclear by the findings if this abscess represents mainly phlegmon or if there is deeper collection that I could not reach with the office I&D.  I therefore recommended a CT of the abdomen and pelvis tomorrow and I have tentatively scheduled the patient for an exam under anesthesia and further I&D for 3/25 pending the CT.  \par Instructions for LWC (remove the packing tomorrow in the shower, cover with gauze, no further packing needed) were given.\par I discussed that he likely has an anal fistula given the recurrent abscess.  I discussed where the fistula is an that down the road he will need a pelvic MRI for further evaluation for anal fistula. \par

## 2021-03-22 NOTE — CONSULT LETTER
[Dear  ___] : Dear ~MINGO, [Courtesy Letter:] : I had the pleasure of seeing your patient, [unfilled], in my office today. [Please see my note below.] : Please see my note below. [Sincerely,] : Sincerely, [FreeTextEntry2] : NATHAN Oconnor [FreeTextEntry3] : Rose Abad M.D., F.A.C.S., F.DUSTIN.S.C.R.S.\par Assistant Professor of Surgery\par [employer] [facility] [employer]\par \par

## 2021-03-23 ENCOUNTER — OUTPATIENT (OUTPATIENT)
Dept: OUTPATIENT SERVICES | Facility: HOSPITAL | Age: 61
LOS: 1 days | End: 2021-03-23
Payer: COMMERCIAL

## 2021-03-23 ENCOUNTER — APPOINTMENT (OUTPATIENT)
Dept: CT IMAGING | Facility: CLINIC | Age: 61
End: 2021-03-23
Payer: COMMERCIAL

## 2021-03-23 VITALS
DIASTOLIC BLOOD PRESSURE: 80 MMHG | HEIGHT: 70 IN | OXYGEN SATURATION: 97 % | SYSTOLIC BLOOD PRESSURE: 133 MMHG | HEART RATE: 97 BPM | TEMPERATURE: 100 F | RESPIRATION RATE: 18 BRPM | WEIGHT: 255.07 LBS

## 2021-03-23 DIAGNOSIS — K61.39 OTHER ISCHIORECTAL ABSCESS: ICD-10-CM

## 2021-03-23 DIAGNOSIS — Z90.79 ACQUIRED ABSENCE OF OTHER GENITAL ORGAN(S): Chronic | ICD-10-CM

## 2021-03-23 DIAGNOSIS — Z90.49 ACQUIRED ABSENCE OF OTHER SPECIFIED PARTS OF DIGESTIVE TRACT: Chronic | ICD-10-CM

## 2021-03-23 DIAGNOSIS — E10.9 TYPE 1 DIABETES MELLITUS WITHOUT COMPLICATIONS: ICD-10-CM

## 2021-03-23 DIAGNOSIS — Z98.890 OTHER SPECIFIED POSTPROCEDURAL STATES: Chronic | ICD-10-CM

## 2021-03-23 DIAGNOSIS — Z00.8 ENCOUNTER FOR OTHER GENERAL EXAMINATION: ICD-10-CM

## 2021-03-23 DIAGNOSIS — Z01.818 ENCOUNTER FOR OTHER PREPROCEDURAL EXAMINATION: ICD-10-CM

## 2021-03-23 DIAGNOSIS — K61.0 ANAL ABSCESS: ICD-10-CM

## 2021-03-23 DIAGNOSIS — Z11.52 ENCOUNTER FOR SCREENING FOR COVID-19: ICD-10-CM

## 2021-03-23 LAB — SARS-COV-2 RNA SPEC QL NAA+PROBE: SIGNIFICANT CHANGE UP

## 2021-03-23 PROCEDURE — C9803: CPT

## 2021-03-23 PROCEDURE — G0463: CPT

## 2021-03-23 PROCEDURE — 74177 CT ABD & PELVIS W/CONTRAST: CPT | Mod: 26

## 2021-03-23 PROCEDURE — U0005: CPT

## 2021-03-23 PROCEDURE — 83036 HEMOGLOBIN GLYCOSYLATED A1C: CPT

## 2021-03-23 PROCEDURE — 85027 COMPLETE CBC AUTOMATED: CPT

## 2021-03-23 PROCEDURE — 82565 ASSAY OF CREATININE: CPT

## 2021-03-23 PROCEDURE — 74177 CT ABD & PELVIS W/CONTRAST: CPT

## 2021-03-23 PROCEDURE — U0003: CPT

## 2021-03-23 PROCEDURE — 80048 BASIC METABOLIC PNL TOTAL CA: CPT

## 2021-03-23 RX ORDER — SODIUM CHLORIDE 9 MG/ML
3 INJECTION INTRAMUSCULAR; INTRAVENOUS; SUBCUTANEOUS EVERY 8 HOURS
Refills: 0 | Status: DISCONTINUED | OUTPATIENT
Start: 2021-03-25 | End: 2021-04-08

## 2021-03-23 RX ORDER — LIDOCAINE HCL 20 MG/ML
0.2 VIAL (ML) INJECTION ONCE
Refills: 0 | Status: DISCONTINUED | OUTPATIENT
Start: 2021-03-25 | End: 2021-04-08

## 2021-03-23 NOTE — H&P PST ADULT - NSICDXPASTMEDICALHX_GEN_ALL_CORE_FT
PAST MEDICAL HISTORY:  Asthma in control    GERD (gastroesophageal reflux disease)     History of type 1 diabetes mellitus 1990's on insulin pump    HTN (hypertension)     Hyperlipidemia     Obesity     CHERELLE on CPAP

## 2021-03-23 NOTE — H&P PST ADULT - NSICDXPROBLEM_GEN_ALL_CORE_FT
PROBLEM DIAGNOSES  Problem: Other ischiorectal abscess  Assessment and Plan: coming in for EUA I&D of ischiorectal abscess    Problem: Type 1 diabetes mellitus without complication, with long term current use of insulin pump  Assessment and Plan: will get instruction from Dr Arredondo

## 2021-03-23 NOTE — H&P PST ADULT - HISTORY OF PRESENT ILLNESS
60yr old male with ischiorectal abscess reoccurrence from last year 9/2020  Pt started with pain on 3/18/31. Went to see Dr Galdamez was given  ointment and cream. Abscess formed over the weekend and was drained in office on Monday.  Pt now coming in for EUA I&D of ischiorectal abscess. Pt denies recent covid symptoms  or covid contacts. Med sig for Diabetes type 1 insulin pump HTN HLD CHERELLE GERD Obesity.  Dr Arredondo to give pump instructions. Calcium score 0.    Note; covid test 3/23/21 [facility]

## 2021-03-23 NOTE — H&P PST ADULT - NSICDXPASTSURGICALHX_GEN_ALL_CORE_FT
PAST SURGICAL HISTORY:  History of lumpectomy axilla 2005    History of orchiectomy 9/2020    History of ptosis repair right childhood congenital    S/P appendectomy age 6

## 2021-03-24 ENCOUNTER — TRANSCRIPTION ENCOUNTER (OUTPATIENT)
Age: 61
End: 2021-03-24

## 2021-03-24 ENCOUNTER — NON-APPOINTMENT (OUTPATIENT)
Age: 61
End: 2021-03-24

## 2021-03-25 ENCOUNTER — APPOINTMENT (OUTPATIENT)
Dept: SURGERY | Facility: HOSPITAL | Age: 61
End: 2021-03-25
Payer: COMMERCIAL

## 2021-03-25 ENCOUNTER — OUTPATIENT (OUTPATIENT)
Dept: OUTPATIENT SERVICES | Facility: HOSPITAL | Age: 61
LOS: 1 days | End: 2021-03-25
Payer: COMMERCIAL

## 2021-03-25 ENCOUNTER — RESULT REVIEW (OUTPATIENT)
Age: 61
End: 2021-03-25

## 2021-03-25 VITALS
DIASTOLIC BLOOD PRESSURE: 88 MMHG | SYSTOLIC BLOOD PRESSURE: 168 MMHG | TEMPERATURE: 98 F | HEART RATE: 100 BPM | RESPIRATION RATE: 18 BRPM | OXYGEN SATURATION: 96 %

## 2021-03-25 VITALS
HEART RATE: 107 BPM | WEIGHT: 255.07 LBS | SYSTOLIC BLOOD PRESSURE: 130 MMHG | DIASTOLIC BLOOD PRESSURE: 79 MMHG | TEMPERATURE: 98 F | RESPIRATION RATE: 18 BRPM | HEIGHT: 70 IN

## 2021-03-25 DIAGNOSIS — K61.39 OTHER ISCHIORECTAL ABSCESS: ICD-10-CM

## 2021-03-25 DIAGNOSIS — Z90.79 ACQUIRED ABSENCE OF OTHER GENITAL ORGAN(S): Chronic | ICD-10-CM

## 2021-03-25 DIAGNOSIS — Z90.49 ACQUIRED ABSENCE OF OTHER SPECIFIED PARTS OF DIGESTIVE TRACT: Chronic | ICD-10-CM

## 2021-03-25 DIAGNOSIS — Z98.890 OTHER SPECIFIED POSTPROCEDURAL STATES: Chronic | ICD-10-CM

## 2021-03-25 LAB
GLUCOSE BLDC GLUCOMTR-MCNC: 100 MG/DL — HIGH (ref 70–99)
GLUCOSE BLDC GLUCOMTR-MCNC: 125 MG/DL — HIGH (ref 70–99)
GLUCOSE BLDC GLUCOMTR-MCNC: 178 MG/DL — HIGH (ref 70–99)

## 2021-03-25 PROCEDURE — 88304 TISSUE EXAM BY PATHOLOGIST: CPT | Mod: 26

## 2021-03-25 PROCEDURE — 46280 REMOVE ANAL FIST COMPLEX: CPT | Mod: 78

## 2021-03-25 PROCEDURE — 88304 TISSUE EXAM BY PATHOLOGIST: CPT

## 2021-03-25 PROCEDURE — C1889: CPT

## 2021-03-25 PROCEDURE — 82962 GLUCOSE BLOOD TEST: CPT

## 2021-03-25 PROCEDURE — 46020 PLACEMENT OF SETON: CPT

## 2021-03-25 RX ORDER — OXYCODONE HYDROCHLORIDE 5 MG/1
10 TABLET ORAL ONCE
Refills: 0 | Status: DISCONTINUED | OUTPATIENT
Start: 2021-03-25 | End: 2021-03-25

## 2021-03-25 RX ORDER — FENTANYL CITRATE 50 UG/ML
25 INJECTION INTRAVENOUS
Refills: 0 | Status: DISCONTINUED | OUTPATIENT
Start: 2021-03-25 | End: 2021-03-25

## 2021-03-25 RX ORDER — OXYCODONE HYDROCHLORIDE 5 MG/1
5 TABLET ORAL ONCE
Refills: 0 | Status: DISCONTINUED | OUTPATIENT
Start: 2021-03-25 | End: 2021-03-25

## 2021-03-25 RX ORDER — OXYCODONE HYDROCHLORIDE 5 MG/1
1 TABLET ORAL
Qty: 16 | Refills: 0
Start: 2021-03-25 | End: 2021-03-28

## 2021-03-25 RX ORDER — SODIUM CHLORIDE 9 MG/ML
1000 INJECTION, SOLUTION INTRAVENOUS
Refills: 0 | Status: DISCONTINUED | OUTPATIENT
Start: 2021-03-25 | End: 2021-04-08

## 2021-03-25 RX ORDER — ONDANSETRON 8 MG/1
4 TABLET, FILM COATED ORAL ONCE
Refills: 0 | Status: COMPLETED | OUTPATIENT
Start: 2021-03-25 | End: 2021-03-25

## 2021-03-25 RX ADMIN — ONDANSETRON 4 MILLIGRAM(S): 8 TABLET, FILM COATED ORAL at 18:14

## 2021-03-25 NOTE — BRIEF OPERATIVE NOTE - OPERATION/FINDINGS
s/p incision and drainage of ischiorectal abscess  surgical treatment of transsphincteric fistula with seton placement

## 2021-03-25 NOTE — ASU DISCHARGE PLAN (ADULT/PEDIATRIC) - ASU DC SPECIAL INSTRUCTIONSFT
Please refer to printed instruction sheet.  Please make an appointment to see Dr. Abad within 1-2 weeks.   Take your antibiotics for 2 more days.

## 2021-03-25 NOTE — ASU PATIENT PROFILE, ADULT - AS SC BRADEN MOBILITY
Bedside and Verbal shift change report given to Lisa HOYOS (oncoming nurse) . Report included the following information SBAR, Kardex, ED Summary, Intake/Output, MAR and Recent Results. (4) no limitation

## 2021-03-25 NOTE — ASU PATIENT PROFILE, ADULT - NS PRO ABUSE SCREEN SUSPICION NEGLECT YN
Patient: Marisol Orta    Procedure(s):  RIGHT EYE PHACOEMULSIFICATION CLEAR CORNEA WITH STANDARD INTRAOCULAR LENS IMPLANT - Wound Class: I-Clean    Diagnosis:RIGHT EYE CATARACT  Diagnosis Additional Information: No value filed.    Anesthesia Type:  MAC    Note:  Anesthesia Post Evaluation    Patient location during evaluation: PACU  Patient participation: Able to fully participate in evaluation  Level of consciousness: awake  Pain management: adequate  Airway patency: patent  Cardiovascular status: acceptable  Respiratory status: acceptable  Hydration status: acceptable  PONV: none     Anesthetic complications: None          Last vitals:  Vitals:    06/20/17 1105 06/20/17 1220 06/20/17 1235   BP: 119/68 112/52 117/64   Resp:  16 16   Temp: 36.3  C (97.3  F)     SpO2: 97% 99% 99%         Electronically Signed By: Joanie Ramirez MD  June 20, 2017  12:44 PM  
no

## 2021-03-25 NOTE — ASU PATIENT PROFILE, ADULT - PSH
History of lumpectomy  axilla 2005  History of orchiectomy  9/2020  History of ptosis repair  right childhood congenital  S/P appendectomy  age 6

## 2021-03-25 NOTE — ASU PATIENT PROFILE, ADULT - PMH
Asthma  in control  GERD (gastroesophageal reflux disease)    History of type 1 diabetes mellitus  1990's on insulin pump  HTN (hypertension)    Hyperlipidemia    Obesity    CHERELLE on CPAP

## 2021-03-25 NOTE — BRIEF OPERATIVE NOTE - NSICDXBRIEFPROCEDURE_GEN_ALL_CORE_FT
PROCEDURES:  Insertion, anal seton 25-Mar-2021 18:00:05  Dandy Flores  Transsphincteric anal fistulotomy 25-Mar-2021 17:59:57  Dandy Flores  Incision of ischiorectal abscess 25-Mar-2021 17:57:10  Dandy Flores

## 2021-03-25 NOTE — ASU PREOP CHECKLIST - COMMENTS
sip of water with medication at 0800; sip of apple juice at 0700 to raise blood glucose "reading was below 70"

## 2021-03-26 PROBLEM — G47.33 OBSTRUCTIVE SLEEP APNEA (ADULT) (PEDIATRIC): Chronic | Status: ACTIVE | Noted: 2021-03-23

## 2021-03-26 PROBLEM — E66.9 OBESITY, UNSPECIFIED: Chronic | Status: ACTIVE | Noted: 2021-03-23

## 2021-03-26 PROBLEM — E78.5 HYPERLIPIDEMIA, UNSPECIFIED: Chronic | Status: ACTIVE | Noted: 2021-03-23

## 2021-03-26 PROBLEM — Z86.39 PERSONAL HISTORY OF OTHER ENDOCRINE, NUTRITIONAL AND METABOLIC DISEASE: Chronic | Status: ACTIVE | Noted: 2021-03-23

## 2021-03-30 ENCOUNTER — NON-APPOINTMENT (OUTPATIENT)
Age: 61
End: 2021-03-30

## 2021-03-31 ENCOUNTER — NON-APPOINTMENT (OUTPATIENT)
Age: 61
End: 2021-03-31

## 2021-04-01 ENCOUNTER — APPOINTMENT (OUTPATIENT)
Dept: SURGERY | Facility: CLINIC | Age: 61
End: 2021-04-01
Payer: COMMERCIAL

## 2021-04-01 VITALS
DIASTOLIC BLOOD PRESSURE: 75 MMHG | SYSTOLIC BLOOD PRESSURE: 148 MMHG | HEART RATE: 105 BPM | OXYGEN SATURATION: 98 % | RESPIRATION RATE: 19 BRPM | TEMPERATURE: 98 F

## 2021-04-01 DIAGNOSIS — Z09 ENCOUNTER FOR FOLLOW-UP EXAMINATION AFTER COMPLETED TREATMENT FOR CONDITIONS OTHER THAN MALIGNANT NEOPLASM: ICD-10-CM

## 2021-04-01 DIAGNOSIS — M62.838 OTHER MUSCLE SPASM: ICD-10-CM

## 2021-04-01 PROCEDURE — 99024 POSTOP FOLLOW-UP VISIT: CPT

## 2021-04-01 RX ORDER — NITROGLYCERIN 20 MG/G
2 OINTMENT TOPICAL
Qty: 1 | Refills: 3 | Status: DISCONTINUED | COMMUNITY
Start: 2021-03-19 | End: 2021-04-01

## 2021-04-01 RX ORDER — METRONIDAZOLE 500 MG/1
500 TABLET ORAL
Qty: 20 | Refills: 2 | Status: DISCONTINUED | COMMUNITY
Start: 2021-03-19 | End: 2021-04-01

## 2021-04-01 RX ORDER — CIPROFLOXACIN HYDROCHLORIDE 500 MG/1
500 TABLET, FILM COATED ORAL TWICE DAILY
Qty: 20 | Refills: 2 | Status: DISCONTINUED | COMMUNITY
Start: 2021-03-19 | End: 2021-04-01

## 2021-04-01 RX ORDER — LEVOFLOXACIN 500 MG/1
500 TABLET, FILM COATED ORAL
Qty: 7 | Refills: 0 | Status: DISCONTINUED | COMMUNITY
Start: 2021-01-29 | End: 2021-04-01

## 2021-04-01 NOTE — ASSESSMENT
[FreeTextEntry1] : Patient with severe postoperative sphincter spasm.  He will continue current pain regimen.  Oxycodone renewed.  Flexeril prescribed.  Continue sitz bath's.  Patient will be seen urgently by Dr. Abad Monday morning.  If the patient's pain remains unchanged he will need a repeat exam under anesthesia.  I do not know if the origin of the patient's abscess was a posterior fissure and this is spasm related to the fissure.  If that is the case then fistulotomy to include the internal sphincter will be needed.  I will discuss the previous operative findings with Dr. Abad to see if this was the case.  Patient knows to go to the emergency room if symptoms worsen or he develops signs and symptoms of recurrent infection.

## 2021-04-01 NOTE — HISTORY OF PRESENT ILLNESS
[FreeTextEntry1] : The patient is s/p EUA with incision and drainage of an ischiorectal abscess by Dr. Abad. \par Patient called yesterday morning with c/o constant muscle spasms. He is s/p drainage of an ischiorectal abscess and transsphincteric anal fistulotomy with seton placement on 3/25. He is taking Tylenol 1 gm alternating Q 3 hrs with Oxycodone 5 mg - unable to take NSAIDS. He reports moving his bowels daily on the 3 M regime- had to stop the Mineral oil due to diarrhea. PLAN: sitz bath Q hr, change the MOM to Miralax BID and continue Metamucil. He will f/u tomorrow to see if there is any improvement. He may need a refill of the oxycodone. \par Persistent drainage. Increased pain. Multiple "spasms" that are severe but short-lasting. 3-4 soft BMs daily.

## 2021-04-01 NOTE — PHYSICAL EXAM
[FreeTextEntry1] : Perianal inspection demonstrated normal incision and drainage site with granulation tissue and seton in place.  Minimal purulent drainage.  Unable to perform digital exam and anoscopy because of spasm pain

## 2021-04-02 ENCOUNTER — OUTPATIENT (OUTPATIENT)
Dept: OUTPATIENT SERVICES | Facility: HOSPITAL | Age: 61
LOS: 1 days | End: 2021-04-02
Payer: COMMERCIAL

## 2021-04-02 ENCOUNTER — APPOINTMENT (OUTPATIENT)
Dept: CT IMAGING | Facility: CLINIC | Age: 61
End: 2021-04-02
Payer: COMMERCIAL

## 2021-04-02 DIAGNOSIS — Z98.890 OTHER SPECIFIED POSTPROCEDURAL STATES: Chronic | ICD-10-CM

## 2021-04-02 DIAGNOSIS — Z09 ENCOUNTER FOR FOLLOW-UP EXAMINATION AFTER COMPLETED TREATMENT FOR CONDITIONS OTHER THAN MALIGNANT NEOPLASM: ICD-10-CM

## 2021-04-02 DIAGNOSIS — Z90.79 ACQUIRED ABSENCE OF OTHER GENITAL ORGAN(S): Chronic | ICD-10-CM

## 2021-04-02 DIAGNOSIS — Z90.49 ACQUIRED ABSENCE OF OTHER SPECIFIED PARTS OF DIGESTIVE TRACT: Chronic | ICD-10-CM

## 2021-04-02 DIAGNOSIS — M62.838 OTHER MUSCLE SPASM: ICD-10-CM

## 2021-04-02 PROCEDURE — 74177 CT ABD & PELVIS W/CONTRAST: CPT | Mod: 26

## 2021-04-02 PROCEDURE — 74177 CT ABD & PELVIS W/CONTRAST: CPT

## 2021-04-02 PROCEDURE — 82565 ASSAY OF CREATININE: CPT

## 2021-04-05 ENCOUNTER — APPOINTMENT (OUTPATIENT)
Dept: SURGERY | Facility: CLINIC | Age: 61
End: 2021-04-05
Payer: COMMERCIAL

## 2021-04-05 VITALS
OXYGEN SATURATION: 97 % | SYSTOLIC BLOOD PRESSURE: 143 MMHG | TEMPERATURE: 97.6 F | DIASTOLIC BLOOD PRESSURE: 74 MMHG | HEART RATE: 101 BPM | RESPIRATION RATE: 18 BRPM

## 2021-04-05 DIAGNOSIS — K61.0 ANAL ABSCESS: ICD-10-CM

## 2021-04-05 DIAGNOSIS — K61.39 OTHER ISCHIORECTAL ABSCESS: ICD-10-CM

## 2021-04-05 DIAGNOSIS — Z87.19 PERSONAL HISTORY OF OTHER DISEASES OF THE DIGESTIVE SYSTEM: ICD-10-CM

## 2021-04-05 LAB — BACTERIA WND CULT: ABNORMAL

## 2021-04-05 PROCEDURE — 99024 POSTOP FOLLOW-UP VISIT: CPT

## 2021-04-05 NOTE — ASSESSMENT
[FreeTextEntry1] : Appropriate postop recovery so far after drainage of a very large recurrent ischiorectal abscess.\par Pelvic floor spasms can happen at postop period and they have been resolving appropriately. \par There was no evidence of an anal fissure at the time of the exam under anesthesia.  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 Discussed he will likely need a LIFT. I  explained the success rate of surgical treatment of a deep fistula (no higher than 80%), and expectations of recovery. I specifically discussed the possibility of tx failure and risk of fistula recurrence. Also the fact that, although all the procedures for a deep fistula are sphincter saving procedures, any disease of the anorectum and any procedure in the area carries a small potential for his sphincter function to be altered. I explained we would be monitoring any incontinence issues during his treatments. Currently he denies FI.  \par All questions answered to the pt's satisfaction. \par RTO in 3 weeks.\par \par

## 2021-04-05 NOTE — CONSULT LETTER
[Dear  ___] : Dear ~MINGO, [Courtesy Letter:] : I had the pleasure of seeing your patient, [unfilled], in my office today. [Please see my note below.] : Please see my note below. [Sincerely,] : Sincerely, [FreeTextEntry2] : NATHAN Oconnor [FreeTextEntry3] : Rose Abad M.D., F.A.C.S., F.DUSTIN.S.C.R.S.\par Assistant Professor of Surgery\par [employer] at [facility]\par \par

## 2021-04-05 NOTE — HISTORY OF PRESENT ILLNESS
[FreeTextEntry1] : Patient feels better today. States his pain is "90-95%" improved. He is currently only taking Tylenol for pain control.\par He was seen by Dr. Gaffney in the office last week for pelvic spasms.  He took Oxycodone and Flexeril on Friday and Saturday with good relief. He then stopped 2/2 constipation. Last BM was this morning. States stool is slightly hard. Taking Miralax BID and Metamucil once daily. Continues to do sitz baths. He had checked with the instructions of the office NP on Friday the seton and it was moving freely.  CT A+P from 4/2/21 demonstrates no acute pathology. Perianal seton in the posterior midline. No associated perirectal collection/abscess.

## 2021-04-05 NOTE — PHYSICAL EXAM
[FreeTextEntry1] : In NAD.\par Perineum with clean, pink posterior midline/left posterolateral wound. + (expected) purulent discharge.\par Seton in place, moves freely, ties are on the outside.\par

## 2021-04-05 NOTE — REASON FOR VISIT
[FreeTextEntry1] : Incision and drainage of recurrent ischiorectal abscess, seton placement for transsphincteric fistula 3/25/21

## 2021-04-07 ENCOUNTER — APPOINTMENT (OUTPATIENT)
Dept: SURGERY | Facility: CLINIC | Age: 61
End: 2021-04-07

## 2021-04-26 ENCOUNTER — APPOINTMENT (OUTPATIENT)
Dept: SURGERY | Facility: CLINIC | Age: 61
End: 2021-04-26
Payer: COMMERCIAL

## 2021-04-26 VITALS
WEIGHT: 250 LBS | SYSTOLIC BLOOD PRESSURE: 149 MMHG | OXYGEN SATURATION: 99 % | DIASTOLIC BLOOD PRESSURE: 84 MMHG | HEIGHT: 70 IN | TEMPERATURE: 97.2 F | RESPIRATION RATE: 18 BRPM | BODY MASS INDEX: 35.79 KG/M2 | HEART RATE: 92 BPM

## 2021-04-26 DIAGNOSIS — K60.3 ANAL FISTULA: ICD-10-CM

## 2021-04-26 PROCEDURE — 99024 POSTOP FOLLOW-UP VISIT: CPT

## 2021-04-26 RX ORDER — ASPIRIN 81 MG/1
81 TABLET, CHEWABLE ORAL
Qty: 100 | Refills: 3 | Status: DISCONTINUED | COMMUNITY
Start: 2019-12-24 | End: 2021-04-26

## 2021-04-26 RX ORDER — OXYCODONE 5 MG/1
5 TABLET ORAL EVERY 4 HOURS
Qty: 20 | Refills: 0 | Status: DISCONTINUED | COMMUNITY
Start: 2021-04-01 | End: 2021-04-26

## 2021-04-29 PROBLEM — K60.3 TRANSSPHINCTERIC ANAL FISTULA: Status: ACTIVE | Noted: 2021-04-29

## 2021-04-29 NOTE — HISTORY OF PRESENT ILLNESS
[FreeTextEntry1] : Today he reports feeling well. Denies pain, drainage or bleeding. Reports having two soft bowel movements daily. Takes Metamucil daily. Denies the use of analgesics.

## 2021-04-29 NOTE — PHYSICAL EXAM
[FreeTextEntry1] : In NAD.\par Perineum with seton in place. The posterior midline/left posterolateral wound is superficial and healing well. Approximately 3 cm in size. \par Seton moves freely, ties are on the outside.\par

## 2021-04-29 NOTE — CONSULT LETTER
[Dear  ___] : Dear ~MINGO, [Courtesy Letter:] : I had the pleasure of seeing your patient, [unfilled], in my office today. [Please see my note below.] : Please see my note below. [Sincerely,] : Sincerely, [FreeTextEntry2] : NATHAN Oconnor [FreeTextEntry3] : Rose Abad M.D., F.A.C.S., F.DUSTIN.S.C.R.S.\par Assistant Professor of Surgery\par [employer] at [facility]\par \par

## 2021-04-29 NOTE — ASSESSMENT
[FreeTextEntry1] : Doing well postop.\par Based on the healing of his wound, we'll plan for 2nd stage of fistula repair in one month's time.\par Emphasized importance of strict blood sugar control.\par I discussed the intraoperative findings of transsphincteric fistula,and that he will likely need a LIFT. I  explained the success rate of surgical treatment of a deep fistula (no higher than 80%), and expectations of recovery. I specifically discussed the possibility of tx failure and risk of fistula recurrence. Also the fact that, although all the procedures for a deep fistula are sphincter saving procedures, any disease of the anorectum and any procedure in the area carries a small potential for his sphincter function to be altered. I explained we would be monitoring any incontinence issues during his treatments. Currently he denies FI.  \par All questions answered to the pt's satisfaction. \par My office will schedule the surgery. \par

## 2021-04-29 NOTE — REASON FOR VISIT
[Post Op: _________] : a [unfilled] post op visit [FreeTextEntry1] : Incision and drainage of recurrent ischiorectal abscess, seton placement for transsphincteric fistula 3/25/21. \par  \par

## 2021-05-19 LAB — SURGICAL PATHOLOGY STUDY: SIGNIFICANT CHANGE UP

## 2021-05-28 ENCOUNTER — OUTPATIENT (OUTPATIENT)
Dept: OUTPATIENT SERVICES | Facility: HOSPITAL | Age: 61
LOS: 1 days | End: 2021-05-28
Payer: COMMERCIAL

## 2021-05-28 VITALS
HEIGHT: 70 IN | TEMPERATURE: 98 F | WEIGHT: 255.07 LBS | OXYGEN SATURATION: 97 % | DIASTOLIC BLOOD PRESSURE: 87 MMHG | HEART RATE: 97 BPM | RESPIRATION RATE: 18 BRPM | SYSTOLIC BLOOD PRESSURE: 142 MMHG

## 2021-05-28 DIAGNOSIS — Z98.890 OTHER SPECIFIED POSTPROCEDURAL STATES: Chronic | ICD-10-CM

## 2021-05-28 DIAGNOSIS — Z90.79 ACQUIRED ABSENCE OF OTHER GENITAL ORGAN(S): Chronic | ICD-10-CM

## 2021-05-28 DIAGNOSIS — Z01.818 ENCOUNTER FOR OTHER PREPROCEDURAL EXAMINATION: ICD-10-CM

## 2021-05-28 DIAGNOSIS — E10.9 TYPE 1 DIABETES MELLITUS WITHOUT COMPLICATIONS: ICD-10-CM

## 2021-05-28 DIAGNOSIS — Z90.49 ACQUIRED ABSENCE OF OTHER SPECIFIED PARTS OF DIGESTIVE TRACT: Chronic | ICD-10-CM

## 2021-05-28 DIAGNOSIS — K60.3 ANAL FISTULA: Chronic | ICD-10-CM

## 2021-05-28 DIAGNOSIS — K60.3 ANAL FISTULA: ICD-10-CM

## 2021-05-28 PROCEDURE — G0463: CPT

## 2021-05-28 PROCEDURE — 85027 COMPLETE CBC AUTOMATED: CPT

## 2021-05-28 PROCEDURE — 83036 HEMOGLOBIN GLYCOSYLATED A1C: CPT

## 2021-05-28 PROCEDURE — 80048 BASIC METABOLIC PNL TOTAL CA: CPT

## 2021-05-28 RX ORDER — ACETAMINOPHEN 500 MG
2 TABLET ORAL
Qty: 0 | Refills: 0 | DISCHARGE

## 2021-05-28 RX ORDER — CIPROFLOXACIN LACTATE 400MG/40ML
0 VIAL (ML) INTRAVENOUS
Qty: 0 | Refills: 0 | DISCHARGE

## 2021-05-28 RX ORDER — SODIUM CHLORIDE 9 MG/ML
3 INJECTION INTRAMUSCULAR; INTRAVENOUS; SUBCUTANEOUS EVERY 8 HOURS
Refills: 0 | Status: DISCONTINUED | OUTPATIENT
Start: 2021-06-11 | End: 2021-06-25

## 2021-05-28 RX ORDER — LIDOCAINE HCL 20 MG/ML
0.2 VIAL (ML) INJECTION ONCE
Refills: 0 | Status: DISCONTINUED | OUTPATIENT
Start: 2021-06-11 | End: 2021-06-25

## 2021-05-28 RX ORDER — ASPIRIN/CALCIUM CARB/MAGNESIUM 324 MG
1 TABLET ORAL
Qty: 0 | Refills: 0 | DISCHARGE

## 2021-05-28 RX ORDER — METRONIDAZOLE 500 MG
0 TABLET ORAL
Qty: 0 | Refills: 0 | DISCHARGE

## 2021-05-28 NOTE — H&P PST ADULT - HISTORY OF PRESENT ILLNESS
60yr old male with ischiorectal abscess reoccurrence from last year 9/2020  Pt started with pain on 3/18/31. Went to see Dr Galdamez was given  ointment and cream. Abscess formed over the weekend and was drained in office on Monday.  Pt now coming in for EUA I&D of ischiorectal abscess. Pt denies recent covid symptoms  or covid contacts. Med sig for Diabetes type 1 insulin pump HTN HLD CHERELLE GERD Obesity.  Dr Arredondo to give pump instructions. Calcium score 0.    Note; covid test 3/23/21 [facility] 60yr old male with ischiorectal abscess reoccurrence from last year 9/2020  Pt started with pain on 3/18/31. Went to see Dr Galdamez was given  ointment and cream. Abscess formed over the weekend and was drained in office on Monday.  Pt had EUA I&D of ischiorectal abscess on 3/25/21 with seton. Pt coming for stage 2  repair of transsphincteric anal fistula lift procedure. Med sig for Diabetes type 1 insulin pump HTN HLD CHERELLE GERD Obesity.  Dr Arredondo to give pump instructions. Calcium score 0. Pt had covid vaccine x2  4/2021. Pump paperwork 3/25/21 in [facility]  no change    Note; covid test 6/8//21 [facility]

## 2021-05-28 NOTE — H&P PST ADULT - NSICDXPASTSURGICALHX_GEN_ALL_CORE_FT
PAST SURGICAL HISTORY:  Anal fistula 3/2021 abscess drained with seton    History of lumpectomy axilla 2005    History of orchiectomy 9/2020    History of ptosis repair right childhood congenital    S/P appendectomy age 6     PAST SURGICAL HISTORY:  Anal fistula 3/2021 abscess drained with seton placement    History of lumpectomy axilla 2005    History of orchiectomy 9/2020    History of ptosis repair right childhood congenital    S/P appendectomy age 6

## 2021-05-28 NOTE — H&P PST ADULT - NSICDXPROBLEM_GEN_ALL_CORE_FT
PROBLEM DIAGNOSES  Problem: Anal fistula  Assessment and Plan: coming in for repair of transsphincteric anal fistula lift repair    Problem: Type 1 diabetes  Assessment and Plan: Dr Arredondo to provide letter for pump instructions

## 2021-06-08 ENCOUNTER — APPOINTMENT (OUTPATIENT)
Dept: DISASTER EMERGENCY | Facility: CLINIC | Age: 61
End: 2021-06-08

## 2021-06-10 ENCOUNTER — TRANSCRIPTION ENCOUNTER (OUTPATIENT)
Age: 61
End: 2021-06-10

## 2021-06-11 ENCOUNTER — RESULT REVIEW (OUTPATIENT)
Age: 61
End: 2021-06-11

## 2021-06-11 ENCOUNTER — OUTPATIENT (OUTPATIENT)
Dept: OUTPATIENT SERVICES | Facility: HOSPITAL | Age: 61
LOS: 1 days | End: 2021-06-11
Payer: COMMERCIAL

## 2021-06-11 ENCOUNTER — APPOINTMENT (OUTPATIENT)
Dept: SURGERY | Facility: HOSPITAL | Age: 61
End: 2021-06-11
Payer: COMMERCIAL

## 2021-06-11 VITALS
WEIGHT: 255.07 LBS | HEART RATE: 82 BPM | OXYGEN SATURATION: 100 % | TEMPERATURE: 98 F | RESPIRATION RATE: 16 BRPM | SYSTOLIC BLOOD PRESSURE: 148 MMHG | DIASTOLIC BLOOD PRESSURE: 73 MMHG | HEIGHT: 70 IN

## 2021-06-11 VITALS
SYSTOLIC BLOOD PRESSURE: 136 MMHG | OXYGEN SATURATION: 98 % | HEART RATE: 75 BPM | DIASTOLIC BLOOD PRESSURE: 67 MMHG | RESPIRATION RATE: 16 BRPM

## 2021-06-11 DIAGNOSIS — Z98.890 OTHER SPECIFIED POSTPROCEDURAL STATES: Chronic | ICD-10-CM

## 2021-06-11 DIAGNOSIS — K60.3 ANAL FISTULA: ICD-10-CM

## 2021-06-11 DIAGNOSIS — K60.3 ANAL FISTULA: Chronic | ICD-10-CM

## 2021-06-11 DIAGNOSIS — Z90.49 ACQUIRED ABSENCE OF OTHER SPECIFIED PARTS OF DIGESTIVE TRACT: Chronic | ICD-10-CM

## 2021-06-11 DIAGNOSIS — Z90.79 ACQUIRED ABSENCE OF OTHER GENITAL ORGAN(S): Chronic | ICD-10-CM

## 2021-06-11 LAB
GLUCOSE BLDC GLUCOMTR-MCNC: 142 MG/DL — HIGH (ref 70–99)
GLUCOSE BLDC GLUCOMTR-MCNC: 174 MG/DL — HIGH (ref 70–99)

## 2021-06-11 PROCEDURE — C9399: CPT

## 2021-06-11 PROCEDURE — 88304 TISSUE EXAM BY PATHOLOGIST: CPT | Mod: 26

## 2021-06-11 PROCEDURE — 82962 GLUCOSE BLOOD TEST: CPT

## 2021-06-11 PROCEDURE — 46275 REMOVE ANAL FIST INTER: CPT | Mod: 79

## 2021-06-11 PROCEDURE — 88304 TISSUE EXAM BY PATHOLOGIST: CPT

## 2021-06-11 PROCEDURE — 46275 REMOVE ANAL FIST INTER: CPT

## 2021-06-11 PROCEDURE — C1889: CPT

## 2021-06-11 PROCEDURE — 46945 INT HRHC LIG 1 HROID W/O IMG: CPT

## 2021-06-11 RX ORDER — FENTANYL CITRATE 50 UG/ML
25 INJECTION INTRAVENOUS
Refills: 0 | Status: DISCONTINUED | OUTPATIENT
Start: 2021-06-11 | End: 2021-06-11

## 2021-06-11 RX ORDER — OXYCODONE HYDROCHLORIDE 5 MG/1
5 TABLET ORAL ONCE
Refills: 0 | Status: DISCONTINUED | OUTPATIENT
Start: 2021-06-11 | End: 2021-06-11

## 2021-06-11 RX ORDER — OXYCODONE HYDROCHLORIDE 5 MG/1
10 TABLET ORAL ONCE
Refills: 0 | Status: DISCONTINUED | OUTPATIENT
Start: 2021-06-11 | End: 2021-06-11

## 2021-06-11 RX ORDER — ONDANSETRON 8 MG/1
4 TABLET, FILM COATED ORAL ONCE
Refills: 0 | Status: DISCONTINUED | OUTPATIENT
Start: 2021-06-11 | End: 2021-06-25

## 2021-06-11 RX ORDER — ACETAMINOPHEN 500 MG
3 TABLET ORAL
Qty: 0 | Refills: 0 | DISCHARGE
Start: 2021-06-11

## 2021-06-11 RX ORDER — OXYCODONE HYDROCHLORIDE 5 MG/1
1 TABLET ORAL
Qty: 15 | Refills: 0
Start: 2021-06-11

## 2021-06-11 RX ORDER — ACETAMINOPHEN 500 MG
975 TABLET ORAL EVERY 6 HOURS
Refills: 0 | Status: DISCONTINUED | OUTPATIENT
Start: 2021-06-11 | End: 2021-06-25

## 2021-06-11 NOTE — BRIEF OPERATIVE NOTE - NSICDXBRIEFPROCEDURE_GEN_ALL_CORE_FT
PROCEDURES:  Exam under anesthesia, with perianal fistulotomy 11-Jun-2021 08:31:51  Eulalia Bee  Ligation of single hemorrhoid 11-Jun-2021 08:32:40  Eulalia Bee

## 2021-06-11 NOTE — BRIEF OPERATIVE NOTE - OPERATION/FINDINGS
posterior left buttock anal fistula limited to external sphincter. does not involve internal sphincter. seton removed. anal fistulotomy. suture ligation of single left posterior internal bleeding hemorrhoid. hemostasis achieved. fibrillar placed in canal.

## 2021-06-11 NOTE — BRIEF OPERATIVE NOTE - NSICDXBRIEFPOSTOP_GEN_ALL_CORE_FT
POST-OP DIAGNOSIS:  Anal fistula 11-Jun-2021 08:33:01  Eulalia Bee  Internal hemorrhoids without complication 11-Jun-2021 08:33:23  Eulalia Bee

## 2021-06-11 NOTE — ASU DISCHARGE PLAN (ADULT/PEDIATRIC) - CARE PROVIDER_API CALL
Rose Abad)  ColonRectal Surgery; Surgery  [address], [address]   [address]  Phone: [phone]  Fax: [phone]  Follow Up Time: 2 weeks

## 2021-06-11 NOTE — ASU PATIENT PROFILE, ADULT - PSH
Anal fistula  3/2021 abscess drained with seton placement  History of lumpectomy  axilla 2005  History of orchiectomy  9/2020  History of ptosis repair  right childhood congenital  S/P appendectomy  age 6

## 2021-06-11 NOTE — ASU DISCHARGE PLAN (ADULT/PEDIATRIC) - ASU DC SPECIAL INSTRUCTIONSFT
Follow printed instructions.  Follow up with Dr. Abad in 2 weeks.  Take tylenol around the clock and prescribed pain medcation as needed.

## 2021-06-14 ENCOUNTER — NON-APPOINTMENT (OUTPATIENT)
Age: 61
End: 2021-06-14

## 2021-06-17 LAB — SURGICAL PATHOLOGY STUDY: SIGNIFICANT CHANGE UP

## 2021-06-21 ENCOUNTER — APPOINTMENT (OUTPATIENT)
Dept: SURGERY | Facility: CLINIC | Age: 61
End: 2021-06-21
Payer: COMMERCIAL

## 2021-06-21 VITALS
DIASTOLIC BLOOD PRESSURE: 88 MMHG | RESPIRATION RATE: 18 BRPM | BODY MASS INDEX: 35.79 KG/M2 | HEART RATE: 93 BPM | OXYGEN SATURATION: 95 % | SYSTOLIC BLOOD PRESSURE: 151 MMHG | HEIGHT: 70 IN | TEMPERATURE: 97.2 F | WEIGHT: 250 LBS

## 2021-06-21 PROCEDURE — 99024 POSTOP FOLLOW-UP VISIT: CPT

## 2021-06-21 RX ORDER — CYCLOBENZAPRINE HYDROCHLORIDE 5 MG/1
5 TABLET, FILM COATED ORAL 3 TIMES DAILY
Qty: 30 | Refills: 0 | Status: DISCONTINUED | COMMUNITY
Start: 2021-04-01 | End: 2021-06-21

## 2021-06-24 NOTE — REASON FOR VISIT
[Post Op: _________] : a [unfilled] post op visit [FreeTextEntry1] : Anal fistulotomy for low intersphincteric fistula on 06/11/21

## 2021-06-24 NOTE — HISTORY OF PRESENT ILLNESS
[FreeTextEntry1] : Karthikeyan is a 60 year old male here for post-op visit.\par Pathology: Fistula tract, excision- Squamous mucosa with acute and chronic inflammation compatible with fistula tract.\par Today he reports significant incisional pain.  + Yellowish drainage. Pain is worse with bowel movements. Takes Tylenol  PRN for pain. He reports he took oxycodone every 6 hrs the first 24 hours after surgery and his pain was well controlled. However he felt bloated and that he needed to have a BM, so he stopped the oxy all together after the first 24 hrs and took multiple doses of Miralax with subsequent diarrhea. He has not used Oxycodone since. He states he has been told he can't have NSAIDs d/t kidney issues. \par Currently he takes Mineral oil, and Metamucil, reports having soft formed bowel movements once a day. He has good control of gas and stool.  \par

## 2021-06-24 NOTE — PHYSICAL EXAM
[FreeTextEntry1] : In NAD.\par Perineum with clean, pink posterior midline wound.  The surrounding tissues are soft, there is no erythema fluctuance or induration.\par \par

## 2021-06-24 NOTE — ASSESSMENT
[FreeTextEntry1] : Appears to be healing well postoperatively.  \par However the pain control is poor, and this appears to be related to the inadequate pain regimen.  \par I advised the patient to try using oxycodone again which at this point of recovery he would probably need once or twice a day.  Continue with the fiber supplements and use MiraLAX daily plus titrate as needed for hard stool.  I instructed him to call the office with any questions.\par RTO in 3 weeks.\par

## 2021-07-14 ENCOUNTER — APPOINTMENT (OUTPATIENT)
Dept: SURGERY | Facility: CLINIC | Age: 61
End: 2021-07-14
Payer: COMMERCIAL

## 2021-07-14 VITALS
HEIGHT: 70 IN | OXYGEN SATURATION: 97 % | HEART RATE: 97 BPM | DIASTOLIC BLOOD PRESSURE: 89 MMHG | TEMPERATURE: 97.8 F | RESPIRATION RATE: 18 BRPM | SYSTOLIC BLOOD PRESSURE: 160 MMHG | BODY MASS INDEX: 35.79 KG/M2 | WEIGHT: 250 LBS

## 2021-07-14 PROCEDURE — 99024 POSTOP FOLLOW-UP VISIT: CPT

## 2021-07-14 NOTE — REASON FOR VISIT
[Post Op: _________] : a [unfilled] post op visit [FreeTextEntry1] : Anal fistulotomy for low intersphincteric fistula on 06/11/21. \par

## 2021-07-14 NOTE — CONSULT LETTER
[Dear  ___] : Dear ~MINGO, [Courtesy Letter:] : I had the pleasure of seeing your patient, [unfilled], in my office today. [Please see my note below.] : Please see my note below. [Sincerely,] : Sincerely, [FreeTextEntry2] : NATHAN Oconnor [FreeTextEntry3] : Rose Abad M.D., F.A.C.S., F.DUSTIN.S.C.R.S.\par Assistant Professor of Surgery\par [employer] at [facility]\par \par

## 2021-07-14 NOTE — PHYSICAL EXAM
[FreeTextEntry1] : In NAD.\par Perineum with much smaller and more superficial clean, pink posterior midline wound.  The surrounding tissues are soft, there is no erythema fluctuance or induration.  JEANETTE is unremarkable.\par \par

## 2021-07-14 NOTE — ASSESSMENT
[FreeTextEntry1] : Doing well postop.  Continue with the fiber supplements and wean off MiraLAX as tolerated.  \par RTO in 2 months.\par

## 2021-07-14 NOTE — HISTORY OF PRESENT ILLNESS
[FreeTextEntry1] : Karthikeyan is a 60 year old male here for  post operative visit. He was last seen on 06/21/21, his pain control was poor due to inadequate pain regimen for fear of constipation. \par Today he reports feeling well. Minimal pain with BMs.  He took oxycodone twice after his last visit 18 hours apart and this resulted in resolution of his pain.  Occasional uses Tylenol. Takes Miralax once a day and Metamucil once a day. States having soft BM's 1-2 times a day.  Denies rectal bleeding.  No episodes of FI.  He is happy with the surgery results so far.

## 2021-09-01 ENCOUNTER — APPOINTMENT (OUTPATIENT)
Dept: SURGERY | Facility: CLINIC | Age: 61
End: 2021-09-01
Payer: COMMERCIAL

## 2021-09-01 VITALS
HEART RATE: 84 BPM | TEMPERATURE: 96.2 F | OXYGEN SATURATION: 97 % | WEIGHT: 250 LBS | BODY MASS INDEX: 35.79 KG/M2 | HEIGHT: 70 IN | RESPIRATION RATE: 18 BRPM | DIASTOLIC BLOOD PRESSURE: 90 MMHG | SYSTOLIC BLOOD PRESSURE: 139 MMHG

## 2021-09-01 DIAGNOSIS — K59.4 ANAL SPASM: ICD-10-CM

## 2021-09-01 PROCEDURE — 99024 POSTOP FOLLOW-UP VISIT: CPT

## 2021-09-01 RX ORDER — KETOROLAC TROMETHAMINE 30 MG/ML
30 INJECTION, SOLUTION INTRAMUSCULAR; INTRAVENOUS
Qty: 1 | Refills: 0 | Status: DISCONTINUED | COMMUNITY
Start: 2020-09-09 | End: 2021-09-01

## 2021-09-01 NOTE — PHYSICAL EXAM
[FreeTextEntry1] : In NAD.\par Perineum with healed external part of the wound.  The surrounding tissues are soft, there is no erythema fluctuance or induration.  JEANETTE is unremarkable, with excellent sphincter tone. \par Anoscopy with well-healing internal part of the wound.\par There is a subcentimeter skin lesion to the left of the sacrococcygeal midline above the tailbone level.  This is clearly not related to the anorectum.\par \par

## 2021-09-01 NOTE — REASON FOR VISIT
[Post Op: _________] : a [unfilled] post op visit [FreeTextEntry1] : Anal fistulotomy for low intersphincteric fistula on 06/11/21.

## 2021-09-01 NOTE — HISTORY OF PRESENT ILLNESS
[FreeTextEntry1] : Karthikeyan is a 60 year old male here for post operative visit. He was last seen on 07/14/21 ,Doing well postop. Continue with the fiber supplements and wean off MiraLAX as tolerated. \par Today he reports feeling well. Reports occasional discomfort. Denies bleeding, drainage, or constipation. Denies the use of analgesics. Reports having soft bowel movements once or twice a day. Takes Metamucil daily. Denies the use of laxatives. \par Skin my advice about a small skin lesion he has had for a long time in his left buttock at the level of his tailbone, he is scheduled to see dermatology about it.

## 2021-09-01 NOTE — ASSESSMENT
[FreeTextEntry1] : Doing well postop.   \par I advised him to see the dermatologist for the left buttock skin lesion which is unrelated to the anorectum.  If dermatology recommend's excision and they cannot do it I told him I can excise it in the office under local.\par RTO as needed.\par

## 2021-09-03 ENCOUNTER — OUTPATIENT (OUTPATIENT)
Dept: OUTPATIENT SERVICES | Facility: HOSPITAL | Age: 61
LOS: 1 days | End: 2021-09-03
Payer: COMMERCIAL

## 2021-09-03 ENCOUNTER — APPOINTMENT (OUTPATIENT)
Dept: RADIOLOGY | Facility: IMAGING CENTER | Age: 61
End: 2021-09-03
Payer: COMMERCIAL

## 2021-09-03 DIAGNOSIS — Z98.890 OTHER SPECIFIED POSTPROCEDURAL STATES: Chronic | ICD-10-CM

## 2021-09-03 DIAGNOSIS — Z90.49 ACQUIRED ABSENCE OF OTHER SPECIFIED PARTS OF DIGESTIVE TRACT: Chronic | ICD-10-CM

## 2021-09-03 DIAGNOSIS — Z90.79 ACQUIRED ABSENCE OF OTHER GENITAL ORGAN(S): Chronic | ICD-10-CM

## 2021-09-03 DIAGNOSIS — R05 COUGH: ICD-10-CM

## 2021-09-03 DIAGNOSIS — K60.3 ANAL FISTULA: Chronic | ICD-10-CM

## 2021-09-03 PROCEDURE — 71046 X-RAY EXAM CHEST 2 VIEWS: CPT | Mod: 26

## 2021-09-03 PROCEDURE — 71046 X-RAY EXAM CHEST 2 VIEWS: CPT

## 2021-09-13 ENCOUNTER — NON-APPOINTMENT (OUTPATIENT)
Age: 61
End: 2021-09-13

## 2021-09-14 ENCOUNTER — APPOINTMENT (OUTPATIENT)
Dept: CARDIOLOGY | Facility: CLINIC | Age: 61
End: 2021-09-14
Payer: COMMERCIAL

## 2021-09-14 ENCOUNTER — NON-APPOINTMENT (OUTPATIENT)
Age: 61
End: 2021-09-14

## 2021-09-14 VITALS
SYSTOLIC BLOOD PRESSURE: 130 MMHG | HEIGHT: 70 IN | BODY MASS INDEX: 38.08 KG/M2 | TEMPERATURE: 98.9 F | HEART RATE: 88 BPM | DIASTOLIC BLOOD PRESSURE: 74 MMHG | WEIGHT: 266 LBS | OXYGEN SATURATION: 95 %

## 2021-09-14 DIAGNOSIS — R05 COUGH: ICD-10-CM

## 2021-09-14 PROCEDURE — 99214 OFFICE O/P EST MOD 30 MIN: CPT | Mod: 25

## 2021-09-14 PROCEDURE — A9500: CPT

## 2021-09-14 PROCEDURE — 78452 HT MUSCLE IMAGE SPECT MULT: CPT

## 2021-09-14 PROCEDURE — 93000 ELECTROCARDIOGRAM COMPLETE: CPT | Mod: 59

## 2021-09-14 PROCEDURE — 93015 CV STRESS TEST SUPVJ I&R: CPT

## 2021-09-14 NOTE — ASSESSMENT
[FreeTextEntry1] : Patient is due for annual wellenss visit to address immunizations, colon ca screen, prostate ca screen etc

## 2021-09-14 NOTE — HISTORY OF PRESENT ILLNESS
[FreeTextEntry1] : This is a 60 year old gentlemen presents today for an acute episode of chest pain and dizziness. Patient states that the episode lasted aprox 30 minutes and resolved on it's own. Patient states that he felt the pain to the back of his throat and up into his left arm. Patient states that he has currently been coughing and has been treated with prednisone for asthma exacerbation by Dr. Griffiths (Pulmonary). Patient states that he is experiencing some shortness of breath associated with the cough. Today, Patient denies palpitations, chest pain, nausea, vomiting, dizziness and lightheadedness.\par

## 2021-09-14 NOTE — REVIEW OF SYSTEMS
[SOB] : shortness of breath [Dyspnea on exertion] : dyspnea during exertion [Chest Discomfort] : chest discomfort [Cough] : cough [Negative] : Heme/Lymph [Lower Ext Edema] : no extremity edema [Leg Claudication] : no intermittent leg claudication [Palpitations] : no palpitations [Orthopnea] : no orthopnea [PND] : no PND [Syncope] : no syncope [Wheezing] : no wheezing [Coughing Up Blood] : no hemoptysis [Snoring] : no snoring

## 2021-09-15 ENCOUNTER — APPOINTMENT (OUTPATIENT)
Dept: CARDIOLOGY | Facility: CLINIC | Age: 61
End: 2021-09-15
Payer: COMMERCIAL

## 2021-09-15 ENCOUNTER — NON-APPOINTMENT (OUTPATIENT)
Age: 61
End: 2021-09-15

## 2021-09-15 PROCEDURE — 93306 TTE W/DOPPLER COMPLETE: CPT

## 2021-09-18 ENCOUNTER — OUTPATIENT (OUTPATIENT)
Dept: OUTPATIENT SERVICES | Facility: HOSPITAL | Age: 61
LOS: 1 days | End: 2021-09-18
Payer: COMMERCIAL

## 2021-09-18 ENCOUNTER — APPOINTMENT (OUTPATIENT)
Dept: ULTRASOUND IMAGING | Facility: CLINIC | Age: 61
End: 2021-09-18
Payer: COMMERCIAL

## 2021-09-18 DIAGNOSIS — Z90.49 ACQUIRED ABSENCE OF OTHER SPECIFIED PARTS OF DIGESTIVE TRACT: Chronic | ICD-10-CM

## 2021-09-18 DIAGNOSIS — K82.4 CHOLESTEROLOSIS OF GALLBLADDER: ICD-10-CM

## 2021-09-18 DIAGNOSIS — Z98.890 OTHER SPECIFIED POSTPROCEDURAL STATES: Chronic | ICD-10-CM

## 2021-09-18 DIAGNOSIS — K60.3 ANAL FISTULA: Chronic | ICD-10-CM

## 2021-09-18 DIAGNOSIS — Z00.8 ENCOUNTER FOR OTHER GENERAL EXAMINATION: ICD-10-CM

## 2021-09-18 DIAGNOSIS — Z90.79 ACQUIRED ABSENCE OF OTHER GENITAL ORGAN(S): Chronic | ICD-10-CM

## 2021-09-18 PROCEDURE — 76700 US EXAM ABDOM COMPLETE: CPT

## 2021-09-18 PROCEDURE — 76700 US EXAM ABDOM COMPLETE: CPT | Mod: 26

## 2021-09-27 ENCOUNTER — APPOINTMENT (OUTPATIENT)
Dept: SURGERY | Facility: CLINIC | Age: 61
End: 2021-09-27
Payer: COMMERCIAL

## 2021-09-27 VITALS
HEIGHT: 70 IN | OXYGEN SATURATION: 98 % | TEMPERATURE: 98 F | SYSTOLIC BLOOD PRESSURE: 138 MMHG | DIASTOLIC BLOOD PRESSURE: 89 MMHG | BODY MASS INDEX: 38.08 KG/M2 | HEART RATE: 90 BPM | RESPIRATION RATE: 18 BRPM | WEIGHT: 266 LBS

## 2021-09-27 PROCEDURE — 99214 OFFICE O/P EST MOD 30 MIN: CPT

## 2021-09-27 RX ORDER — OXYCODONE 5 MG/1
5 TABLET ORAL
Qty: 15 | Refills: 0 | Status: DISCONTINUED | COMMUNITY
Start: 2021-06-11 | End: 2021-09-27

## 2021-09-27 RX ORDER — GUAIFENESIN 600 MG/1
TABLET, EXTENDED RELEASE ORAL
Refills: 0 | Status: ACTIVE | COMMUNITY

## 2021-09-27 RX ORDER — IPRATROPIUM BROMIDE 21 UG/1
0.03 SPRAY NASAL
Refills: 0 | Status: ACTIVE | COMMUNITY

## 2021-10-05 NOTE — HISTORY OF PRESENT ILLNESS
[FreeTextEntry1] : Karthikeyan is a 60 year old male here for an evaluation of gall bladder polyp.  US of the abdomen from 09/18/21 with 10 x 7 x 10 mm gallbladder polyp, increased in size from 4/10/2015. \par He is s/p Anal fistulotomy for low intersphincteric fistula on 06/11/21 doing well, reports no issues.\par He denies abdominal pain,nausea or vomiting. He is currently on Prednisone 30 mg taper for Asthma and his breathing has been getting better. He will be restarting his Baby Aspirin 81 mg.\par

## 2021-10-05 NOTE — PHYSICAL EXAM
[FreeTextEntry1] : This is a 61 year-old well-developed male in no apparent distress.\par \par HEENT normocephalic, anicteric, external ears normal bilaterally, EOMs intact.\par \par Cardiac - regular rate and rhythm.\par \par Abdomen soft, nontender, nondistended, no masses. No hepatosplenomegaly.\par \par Neuro-cranial nerves grossly intact. Normal gait.\par \par Psychiatric-oriented to time place and person. Good understanding of conversation.\par \par \par

## 2021-10-05 NOTE — ASSESSMENT
[FreeTextEntry1] : 62 yo male with a 1 cm gallbladder polyp on US, increased in size. I discussed that the current guidelines for management of gallbladder polyps recommend laparoscopic cholecystectomy for a gallbladder polyp of 1 cm and above as that is a risk of the polyp being adenomatous with a chance of the potential malignancy in the future. \par I recommended laparoscopic cholecystectomy, possible open, and discussed the details, risks, benefits and alternatives of the procedure and recovery expectations with the patient. \par He wishes to proceed and will schedule the surgery with my office.\par He will need Pulmonary & Medical clearance. \par \par \par \par

## 2021-10-05 NOTE — CONSULT LETTER
[Dear  ___] : Dear ~MINGO, [Courtesy Letter:] : I had the pleasure of seeing your patient, [unfilled], in my office today. [Please see my note below.] : Please see my note below. [Sincerely,] : Sincerely, [DrGarret  ___] : Dr. OCHOA [FreeTextEntry2] : NATHAN Oconnor [FreeTextEntry3] : Rose Abad M.D., F.A.C.S., F.DUSTIN.S.C.R.S.\par Assistant Professor of Surgery\par [employer] at [facility]\par \par  55 y/o female brought in for vaginal bleeding. patient states she is 10 weeks pregnant, a1, began spotting blood last night, has not soaked a pad only small drops she noticed this morning. patient denies abdominal pain, nausea, vomiting, diarrhea, burning on urination, chest pain, dizziness, fatigue.

## 2021-10-18 ENCOUNTER — OUTPATIENT (OUTPATIENT)
Dept: OUTPATIENT SERVICES | Facility: HOSPITAL | Age: 61
LOS: 1 days | End: 2021-10-18
Payer: COMMERCIAL

## 2021-10-18 VITALS
DIASTOLIC BLOOD PRESSURE: 88 MMHG | HEIGHT: 70 IN | TEMPERATURE: 97 F | RESPIRATION RATE: 16 BRPM | SYSTOLIC BLOOD PRESSURE: 145 MMHG | OXYGEN SATURATION: 96 % | WEIGHT: 266.98 LBS | HEART RATE: 84 BPM

## 2021-10-18 DIAGNOSIS — K60.3 ANAL FISTULA: Chronic | ICD-10-CM

## 2021-10-18 DIAGNOSIS — K82.4 CHOLESTEROLOSIS OF GALLBLADDER: ICD-10-CM

## 2021-10-18 DIAGNOSIS — G47.33 OBSTRUCTIVE SLEEP APNEA (ADULT) (PEDIATRIC): ICD-10-CM

## 2021-10-18 DIAGNOSIS — Z01.818 ENCOUNTER FOR OTHER PREPROCEDURAL EXAMINATION: ICD-10-CM

## 2021-10-18 DIAGNOSIS — Z90.49 ACQUIRED ABSENCE OF OTHER SPECIFIED PARTS OF DIGESTIVE TRACT: Chronic | ICD-10-CM

## 2021-10-18 DIAGNOSIS — Z90.79 ACQUIRED ABSENCE OF OTHER GENITAL ORGAN(S): Chronic | ICD-10-CM

## 2021-10-18 DIAGNOSIS — E10.9 TYPE 1 DIABETES MELLITUS WITHOUT COMPLICATIONS: ICD-10-CM

## 2021-10-18 DIAGNOSIS — J45.909 UNSPECIFIED ASTHMA, UNCOMPLICATED: ICD-10-CM

## 2021-10-18 DIAGNOSIS — Z98.890 OTHER SPECIFIED POSTPROCEDURAL STATES: Chronic | ICD-10-CM

## 2021-10-18 LAB
ALBUMIN SERPL ELPH-MCNC: 4.2 G/DL — SIGNIFICANT CHANGE UP (ref 3.3–5)
ALP SERPL-CCNC: 155 U/L — HIGH (ref 40–120)
ALT FLD-CCNC: 63 U/L — HIGH (ref 10–45)
ANION GAP SERPL CALC-SCNC: 11 MMOL/L — SIGNIFICANT CHANGE UP (ref 5–17)
AST SERPL-CCNC: 33 U/L — SIGNIFICANT CHANGE UP (ref 10–40)
BILIRUB SERPL-MCNC: 0.2 MG/DL — SIGNIFICANT CHANGE UP (ref 0.2–1.2)
BUN SERPL-MCNC: 19 MG/DL — SIGNIFICANT CHANGE UP (ref 7–23)
CALCIUM SERPL-MCNC: 9.3 MG/DL — SIGNIFICANT CHANGE UP (ref 8.4–10.5)
CHLORIDE SERPL-SCNC: 106 MMOL/L — SIGNIFICANT CHANGE UP (ref 96–108)
CO2 SERPL-SCNC: 21 MMOL/L — LOW (ref 22–31)
CREAT SERPL-MCNC: 1.28 MG/DL — SIGNIFICANT CHANGE UP (ref 0.5–1.3)
GLUCOSE SERPL-MCNC: 74 MG/DL — SIGNIFICANT CHANGE UP (ref 70–99)
HCT VFR BLD CALC: 43.3 % — SIGNIFICANT CHANGE UP (ref 39–50)
HGB BLD-MCNC: 13.7 G/DL — SIGNIFICANT CHANGE UP (ref 13–17)
MCHC RBC-ENTMCNC: 26.8 PG — LOW (ref 27–34)
MCHC RBC-ENTMCNC: 31.6 GM/DL — LOW (ref 32–36)
MCV RBC AUTO: 84.7 FL — SIGNIFICANT CHANGE UP (ref 80–100)
NRBC # BLD: 0 /100 WBCS — SIGNIFICANT CHANGE UP (ref 0–0)
PLATELET # BLD AUTO: 344 K/UL — SIGNIFICANT CHANGE UP (ref 150–400)
POTASSIUM SERPL-MCNC: 4.2 MMOL/L — SIGNIFICANT CHANGE UP (ref 3.5–5.3)
POTASSIUM SERPL-SCNC: 4.2 MMOL/L — SIGNIFICANT CHANGE UP (ref 3.5–5.3)
PROT SERPL-MCNC: 6.8 G/DL — SIGNIFICANT CHANGE UP (ref 6–8.3)
RBC # BLD: 5.11 M/UL — SIGNIFICANT CHANGE UP (ref 4.2–5.8)
RBC # FLD: 14.6 % — HIGH (ref 10.3–14.5)
SODIUM SERPL-SCNC: 138 MMOL/L — SIGNIFICANT CHANGE UP (ref 135–145)
WBC # BLD: 7.52 K/UL — SIGNIFICANT CHANGE UP (ref 3.8–10.5)
WBC # FLD AUTO: 7.52 K/UL — SIGNIFICANT CHANGE UP (ref 3.8–10.5)

## 2021-10-18 PROCEDURE — 80053 COMPREHEN METABOLIC PANEL: CPT

## 2021-10-18 PROCEDURE — 85027 COMPLETE CBC AUTOMATED: CPT

## 2021-10-18 PROCEDURE — 83036 HEMOGLOBIN GLYCOSYLATED A1C: CPT

## 2021-10-18 PROCEDURE — G0463: CPT

## 2021-10-18 RX ORDER — CIPROFLOXACIN LACTATE 400MG/40ML
400 VIAL (ML) INTRAVENOUS ONCE
Refills: 0 | Status: DISCONTINUED | OUTPATIENT
Start: 2021-11-01 | End: 2021-11-02

## 2021-10-18 NOTE — H&P PST ADULT - PROBLEM SELECTOR PLAN 1
laparoscopic cholecystectomy  PSt labs send  preprocedure surgical scrub instructions discussed  recent cardiac eval with echo stress report on file

## 2021-10-18 NOTE — H&P PST ADULT - PROBLEM SELECTOR PLAN 2
continue inhaler  Pulm eval continue inhaler  Hydrocortisone 100 mg ivss to be given pre op as pulmonologist Dr. Hurt, instructed to bring his CPAP day of surgery

## 2021-10-18 NOTE — H&P PST ADULT - NSICDXPASTSURGICALHX_GEN_ALL_CORE_FT
PAST SURGICAL HISTORY:  Anal fistula 3/2021 abscess drained with seton placement and 5/2021    History of lumpectomy axilla 2005    History of orchiectomy 9/2020    History of ptosis repair right childhood congenital    S/P appendectomy age 6

## 2021-10-18 NOTE — H&P PST ADULT - NSICDXPASTMEDICALHX_GEN_ALL_CORE_FT
PAST MEDICAL HISTORY:  Asthma current exacerbation, improved on Prednisone and Mucinex , followed by Pulm    Gall bladder polyp     GERD (gastroesophageal reflux disease)     History of type 1 diabetes mellitus 1990's on insulin pump    HTN (hypertension)     Hyperlipidemia     Obesity     CHERELLE on CPAP

## 2021-10-18 NOTE — H&P PST ADULT - OTHER CARE PROVIDERS
Endo Dr Spencer Arredondo [phone] last visit  2 weeks ago,  Dr. Griffiths (Pulmonary)  next visit 10/28/2021 ( [phone])

## 2021-10-18 NOTE — H&P PST ADULT - HISTORY OF PRESENT ILLNESS
Karthikeyan Pedersen 61 year old Obese male with PMH of Diabetes type 1 has  insulin pump, asthma, [place], HLD CHERELLE cpap use, GERD with gall stones planned for laparoscopic cholecystectomy on 11/1/2021 in [facility]  ***Endo Dr Arredondo to give pump instructions ( emailed House endo Dr. Burgos)   **recent asthma exacerbation x 1 month on Prednisone taper ( documented on surgeons note, patient was on prednisone 30 mg on 9/27/2021) : Pulm eval prior to sx   ***Hx of atypical chest pain 9/14/2021 related to albuterol and prednisone evaluated by PCP/cardio, negative cardiac testing ( report on file)   covid test 10/29/2021 near home  Fully vaccinated   denies any recent covid infection or exposure

## 2021-10-19 LAB
A1C WITH ESTIMATED AVERAGE GLUCOSE RESULT: 8.3 % — HIGH (ref 4–5.6)
ESTIMATED AVERAGE GLUCOSE: 192 MG/DL — HIGH (ref 68–114)

## 2021-10-21 ENCOUNTER — APPOINTMENT (OUTPATIENT)
Dept: SURGERY | Facility: HOSPITAL | Age: 61
End: 2021-10-21

## 2021-10-21 PROBLEM — K82.4 CHOLESTEROLOSIS OF GALLBLADDER: Chronic | Status: ACTIVE | Noted: 2021-10-18

## 2021-10-21 PROBLEM — J45.909 UNSPECIFIED ASTHMA, UNCOMPLICATED: Chronic | Status: ACTIVE | Noted: 2020-09-03

## 2021-10-26 ENCOUNTER — NON-APPOINTMENT (OUTPATIENT)
Age: 61
End: 2021-10-26

## 2021-10-26 ENCOUNTER — APPOINTMENT (OUTPATIENT)
Dept: CARDIOLOGY | Facility: CLINIC | Age: 61
End: 2021-10-26
Payer: COMMERCIAL

## 2021-10-26 VITALS
OXYGEN SATURATION: 98 % | SYSTOLIC BLOOD PRESSURE: 140 MMHG | DIASTOLIC BLOOD PRESSURE: 80 MMHG | TEMPERATURE: 98.8 F | HEART RATE: 84 BPM

## 2021-10-26 DIAGNOSIS — R89.9 UNSPECIFIED ABNORMAL FINDING IN SPECIMENS FROM OTHER ORGANS, SYSTEMS AND TISSUES: ICD-10-CM

## 2021-10-26 DIAGNOSIS — R07.89 OTHER CHEST PAIN: ICD-10-CM

## 2021-10-26 DIAGNOSIS — R42 DIZZINESS AND GIDDINESS: ICD-10-CM

## 2021-10-26 DIAGNOSIS — Z01.818 ENCOUNTER FOR OTHER PREPROCEDURAL EXAMINATION: ICD-10-CM

## 2021-10-26 DIAGNOSIS — R06.02 SHORTNESS OF BREATH: ICD-10-CM

## 2021-10-26 LAB
25(OH)D3 SERPL-MCNC: 38.5 NG/ML
ALBUMIN SERPL ELPH-MCNC: 4.1 G/DL
ALP BLD-CCNC: 128 U/L
ALT SERPL-CCNC: 36 U/L
ANION GAP SERPL CALC-SCNC: 14 MMOL/L
APPEARANCE: CLEAR
AST SERPL-CCNC: 27 U/L
BACTERIA: NEGATIVE
BASOPHILS # BLD AUTO: 0.03 K/UL
BASOPHILS NFR BLD AUTO: 0.3 %
BILIRUB DIRECT SERPL-MCNC: 0.1 MG/DL
BILIRUB INDIRECT SERPL-MCNC: 0.1 MG/DL
BILIRUB SERPL-MCNC: 0.2 MG/DL
BILIRUBIN URINE: NEGATIVE
BLOOD URINE: NEGATIVE
BUN SERPL-MCNC: 18 MG/DL
CALCIUM SERPL-MCNC: 9.3 MG/DL
CHLORIDE SERPL-SCNC: 104 MMOL/L
CHOLEST SERPL-MCNC: 186 MG/DL
CK SERPL-CCNC: 394 U/L
CO2 SERPL-SCNC: 23 MMOL/L
COLOR: NORMAL
CREAT SERPL-MCNC: 1.28 MG/DL
EOSINOPHIL # BLD AUTO: 0.01 K/UL
EOSINOPHIL NFR BLD AUTO: 0.1 %
ESTIMATED AVERAGE GLUCOSE: 194 MG/DL
GLUCOSE QUALITATIVE U: NEGATIVE
GLUCOSE SERPL-MCNC: 147 MG/DL
HBA1C MFR BLD HPLC: 8.4 %
HCT VFR BLD CALC: 42.6 %
HDLC SERPL-MCNC: 70 MG/DL
HGB BLD-MCNC: 13.2 G/DL
HYALINE CASTS: 1 /LPF
IMM GRANULOCYTES NFR BLD AUTO: 0.8 %
KETONES URINE: NEGATIVE
LDLC SERPL CALC-MCNC: 104 MG/DL
LEUKOCYTE ESTERASE URINE: NEGATIVE
LYMPHOCYTES # BLD AUTO: 1.57 K/UL
LYMPHOCYTES NFR BLD AUTO: 14.3 %
MAN DIFF?: NORMAL
MCHC RBC-ENTMCNC: 26.7 PG
MCHC RBC-ENTMCNC: 31 GM/DL
MCV RBC AUTO: 86.2 FL
MICROSCOPIC-UA: NORMAL
MONOCYTES # BLD AUTO: 0.56 K/UL
MONOCYTES NFR BLD AUTO: 5.1 %
NEUTROPHILS # BLD AUTO: 8.69 K/UL
NEUTROPHILS NFR BLD AUTO: 79.4 %
NITRITE URINE: NEGATIVE
NONHDLC SERPL-MCNC: 116 MG/DL
PH URINE: 6
PLATELET # BLD AUTO: 307 K/UL
POTASSIUM SERPL-SCNC: 4.4 MMOL/L
PROT SERPL-MCNC: 6.6 G/DL
PROTEIN URINE: NORMAL
PSA SERPL-MCNC: 2.36 NG/ML
RBC # BLD: 4.94 M/UL
RBC # FLD: 14.3 %
RED BLOOD CELLS URINE: 0 /HPF
SODIUM SERPL-SCNC: 140 MMOL/L
SPECIFIC GRAVITY URINE: 1.01
SQUAMOUS EPITHELIAL CELLS: 1 /HPF
T4 FREE SERPL-MCNC: 1.2 NG/DL
TRIGL SERPL-MCNC: 63 MG/DL
TSH SERPL-ACNC: 0.92 UIU/ML
UROBILINOGEN URINE: NORMAL
WBC # FLD AUTO: 10.95 K/UL
WHITE BLOOD CELLS URINE: 0 /HPF

## 2021-10-26 PROCEDURE — 93000 ELECTROCARDIOGRAM COMPLETE: CPT

## 2021-10-26 PROCEDURE — 99214 OFFICE O/P EST MOD 30 MIN: CPT

## 2021-10-26 NOTE — HISTORY OF PRESENT ILLNESS
[No Pertinent Cardiac History] : no history of aortic stenosis, atrial fibrillation, coronary artery disease, recent myocardial infarction, or implantable device/pacemaker [Asthma] : asthma [Sleep Apnea] : sleep apnea [No Adverse Anesthesia Reaction] : no adverse anesthesia reaction in self or family member [Diabetes] : diabetes [(Patient denies any chest pain, claudication, dyspnea on exertion, orthopnea, palpitations or syncope)] : Patient denies any chest pain, claudication, dyspnea on exertion, orthopnea, palpitations or syncope [Aortic Stenosis] : no aortic stenosis [Atrial Fibrillation] : no atrial fibrillation [Coronary Artery Disease] : no coronary artery disease [Recent Myocardial Infarction] : no recent myocardial infarction [Implantable Device/Pacemaker] : no implantable device/pacemaker [COPD] : no COPD [Smoker] : not a smoker [Family Member] : no family member with adverse anesthesia reaction/sudden death [Self] : no previous adverse anesthesia reaction [Chronic Anticoagulation] : no chronic anticoagulation [Chronic Kidney Disease] : no chronic kidney disease [FreeTextEntry1] : Cholecystectomy [FreeTextEntry2] : 11/01/2021 [FreeTextEntry3] : Dr. Cheema [FreeTextEntry4] : This is a 61 year old gentlemen who presents today for pre-op medical clearance for upcoming cholecystectomy on Nov 1, 2021. Patient states that he is feeling much better than his last visit to our office. He is no longer experiencing a cough, chest pain, SOB, and dizziness. Patient states that he is feeling good and has no issues for today. Lab work was reviewed from Sept 2021.  [FreeTextEntry7] : Echo and Nuclear completed recently Sept 2021

## 2021-10-27 ENCOUNTER — NON-APPOINTMENT (OUTPATIENT)
Age: 61
End: 2021-10-27

## 2021-10-28 NOTE — PROGRESS NOTE ADULT - SUBJECTIVE AND OBJECTIVE BOX
HPI:    Patient ID: Bernard Simons is a 42year old female. Exercise level: somewhat active and has been following low salt diet. Weight has been down (189 to 160's). House wife. Does everything in home.     Wt Readings from Last 3 Encounters:  10/28/21 : Cheif complaints and Diagnosis: Testicular infarction    Subjective:       REVIEW OF SYSTEMS:    CONSTITUTIONAL: No weakness, fevers or chills  EYES/ENT: No visual changes;  No vertigo or throat pain   NECK: No pain or stiffness  RESPIRATORY: No cough, wheezing, hemoptysis; No shortness of breath  CARDIOVASCULAR: No chest pain or palpitations  GASTROINTESTINAL: No abdominal or epigastric pain. No nausea, vomiting, or hematemesis; No diarrhea or constipation. No melena or hematochezia.  GENITOURINARY: No dysuria, frequency or hematuria  NEUROLOGICAL: No numbness or weakness  SKIN: No itching, burning, rashes, or lesions   All other review of systems is negative unless indicated above      Vital Signs Last 24 Hrs  T(C): 36.7 (09 Sep 2020 23:25), Max: 37 (09 Sep 2020 17:35)  T(F): 98.1 (09 Sep 2020 23:25), Max: 98.6 (09 Sep 2020 17:35)  HR: 88 (09 Sep 2020 23:25) (72 - 88)  BP: 133/67 (10 Sep 2020 06:02) (132/77 - 179/89)  BP(mean): 87 (09 Sep 2020 17:35) (87 - 99)  RR: 18 (09 Sep 2020 21:01) (17 - 18)  SpO2: 95% (10 Sep 2020 06:02) (95% - 100%)    HEENT:   pupils equal and reactive, EOMI, no oropharyngeal lesions, erythema, exudates, oral thrush    NECK:   supple, no carotid bruits, no palpable lymph nodes, no thyromegaly    CV:  +S1, +S2, regular, no murmurs or rubs    RESP:   lungs clear to auscultation bilaterally, no wheezing, rales, rhonchi, good air entry bilaterally    BREAST:  not examined    GI:  abdomen soft, non-tender, non-distended, normal BS, no bruits, no abdominal masses, no palpable masses    RECTAL:  not examined    :  not examined    MSK:   normal muscle tone, no atrophy, no rigidity, no contractions    EXT:   no clubbing, no cyanosis, no edema, no calf pain, swelling or erythema    VASCULAR:  pulses equal and symmetric in the upper and lower extremities    NEURO:  AAOX3, no focal neurological deficits, follows all commands, able to move extremities spontaneously    SKIN:  no ulcers, lesions or rashes    MEDICATIONS  (STANDING):  aspirin  chewable 81 milliGRAM(s) Oral daily  budesonide  80 MICROgram(s)/formoterol 4.5 MICROgram(s) Inhaler 2 Puff(s) Inhalation two times a day  dextrose 5%. 1000 milliLiter(s) (50 mL/Hr) IV Continuous <Continuous>  dextrose 50% Injectable 12.5 Gram(s) IV Push once  dextrose 50% Injectable 25 Gram(s) IV Push once  dextrose 50% Injectable 25 Gram(s) IV Push once  influenza   Vaccine 0.5 milliLiter(s) IntraMuscular once  losartan 100 milliGRAM(s) Oral daily  sodium chloride 0.9%. 1000 milliLiter(s) (125 mL/Hr) IV Continuous <Continuous>    MEDICATIONS  (PRN):  aluminum hydroxide/magnesium hydroxide/simethicone Suspension 30 milliLiter(s) Oral every 4 hours PRN Dyspepsia  dextrose 40% Gel 15 Gram(s) Oral once PRN Blood Glucose LESS THAN 70 milliGRAM(s)/deciliter  glucagon  Injectable 1 milliGRAM(s) IntraMuscular once PRN Glucose LESS THAN 70 milligrams/deciliter  HYDROmorphone  Injectable 1 milliGRAM(s) IV Push every 3 hours PRN Severe Pain (7 - 10)  morphine  - Injectable 4 milliGRAM(s) IV Push every 3 hours PRN Moderate Pain (4 - 6)  ondansetron Injectable 4 milliGRAM(s) IV Push every 6 hours PRN Nausea      Urinalysis Basic - ( 09 Sep 2020 16:53 )    Color: Yellow / Appearance: Clear / S.015 / pH: x  Gluc: x / Ketone: Negative  / Bili: Negative / Urobili: Negative mg/dL   Blood: x / Protein: 30 mg/dL / Nitrite: Negative   Leuk Esterase: Negative / RBC: 3-5 /HPF / WBC 0-2   Sq Epi: x / Non Sq Epi: Occasional / Bacteria: Negative    09 Sep 2020 12:25    141    |  110    |  14     ----------------------------<  125    4.0     |  27     |  1.39     Ca    9.1        09 Sep 2020 12:25    TPro  7.2    /  Alb  3.1    /  TBili  0.4    /  DBili  x      /  AST  69     /  ALT  116    /  AlkPhos  173    09 Sep 2020 12:25  LIVER FUNCTIONS - ( 09 Sep 2020 12:25 )  Alb: 3.1 g/dL / Pro: 7.2 gm/dL / ALK PHOS: 173 U/L / ALT: 116 U/L / AST: 69 U/L / GGT: x         PT/INR - ( 09 Sep 2020 12:25 )   PT: 13.6 sec;   INR: 1.17 ratio         PTT - ( 09 Sep 2020 12:25 )  PTT:33.1 secCBC Full  -  ( 09 Sep 2020 12:25 )  WBC Count : 7.87 K/uL  Hemoglobin : 13.4 g/dL  Hematocrit : 41.6 %  Platelet Count - Automated : 303 K/uL  Mean Cell Volume : 82.9 fl  Mean Cell Hemoglobin : 26.7 pg  Mean Cell Hemoglobin Concentration : 32.2 gm/dL  Auto Neutrophil # : 4.84 K/uL  Auto Lymphocyte # : 1.82 K/uL  Auto Monocyte # : 0.82 K/uL  Auto Eosinophil # : 0.35 K/uL  Auto Basophil # : 0.02 K/uL        Blood, Urine: Trace ( @ 16:53)      PT/INR - ( 09 Sep 2020 12:25 )   PT: 13.6 sec;   INR: 1.17 ratio         PTT - ( 09 Sep 2020 12:25 )  PTT:33.1 sec    RADIOLOGY RESULTS:          Assessment and Plan:      58 y/o M with PMHx of asthma, DM, GERD, HTN, and s/p appendectomy presents to the ED c/o worsening +R testicular pain. Was seen in ED on 9/3 for same complaint and was d/c on  to f/u with urologist Dr. Grider. Went to urologist today for increasing R testicular pain and was found to have +infarcts to testicle. Sent for admission, pain management, and medical clearance for procedure to remove R testicle tomorrow. No fever. Allergic to penicillins. PCP: Dr. Victor Manuel Park.    He was dx with DM in , told was type 2 but later on he was labeled as type 1. He is using an automated  insulin pump with Humalog, no GAMEZ at bedtime.       * Testicular infarct  OR today  prn analgesia    * T1DM on insulin pump  instructed not to use insulin after midnight in doses previously used for overnight coverage    * HTN resume Losartan activity change, appetite change, chills, diaphoresis, fatigue, fever, unexpected weight change and weight loss.    HENT: Negative for congestion, dental problem, drooling, ear discharge, ear pain, facial swelling, hearing loss, mouth sores, nosebleeds, pos 1 tablet (10 mg total) by mouth daily. 90 tablet 3   • simvastatin 20 MG Oral Tab Take 1 tablet (20 mg total) by mouth nightly. 90 tablet 1   • FLUOXETINE HCL 20 MG Oral Cap TAKE 1 CAPSULE (20 MG TOTAL) BY MOUTH DAILY.  90 capsule 1   • METOPROLOL SUCCINATE Other (NSVT) Father    • Bleeding Disorders Neg    • Clotting Disorder Neg       Social History:   Social History    Tobacco Use      Smoking status: Never Smoker      Smokeless tobacco: Never Used    Vaping Use      Vaping Use: Never used    Alcohol use: tachypnea, bradypnea, accessory muscle usage, prolonged expiration, respiratory distress or retractions. She is not intubated. Breath sounds: Normal breath sounds and air entry. No stridor, decreased air movement or transmitted upper airway sounds.  No Cheif complaints and Diagnosis: Testicular infarction    Subjective: no complaints      REVIEW OF SYSTEMS:    CONSTITUTIONAL: No weakness, fevers or chills  EYES/ENT: No visual changes;  No vertigo or throat pain   NECK: No pain or stiffness  RESPIRATORY: No cough, wheezing, hemoptysis; No shortness of breath  CARDIOVASCULAR: No chest pain or palpitations  GASTROINTESTINAL: No abdominal or epigastric pain. No nausea, vomiting, or hematemesis; No diarrhea or constipation. No melena or hematochezia.  GENITOURINARY: No dysuria, frequency or hematuria  NEUROLOGICAL: No numbness or weakness  SKIN: No itching, burning, rashes, or lesions   All other review of systems is negative unless indicated above      Vital Signs Last 24 Hrs  T(C): 36.7 (09 Sep 2020 23:25), Max: 37 (09 Sep 2020 17:35)  T(F): 98.1 (09 Sep 2020 23:25), Max: 98.6 (09 Sep 2020 17:35)  HR: 88 (09 Sep 2020 23:25) (72 - 88)  BP: 133/67 (10 Sep 2020 06:02) (132/77 - 179/89)  BP(mean): 87 (09 Sep 2020 17:35) (87 - 99)  RR: 18 (09 Sep 2020 21:01) (17 - 18)  SpO2: 95% (10 Sep 2020 06:02) (95% - 100%)    HEENT:   pupils equal and reactive, EOMI, no oropharyngeal lesions, erythema, exudates, oral thrush    NECK:   supple, no carotid bruits, no palpable lymph nodes, no thyromegaly    CV:  +S1, +S2, regular, no murmurs or rubs    RESP:   lungs clear to auscultation bilaterally, no wheezing, rales, rhonchi, good air entry bilaterally    BREAST:  not examined    GI:  abdomen soft, non-tender, non-distended, normal BS, no bruits, no abdominal masses, no palpable masses    RECTAL:  not examined    :  not examined    MSK:   normal muscle tone, no atrophy, no rigidity, no contractions    EXT:   no clubbing, no cyanosis, no edema, no calf pain, swelling or erythema    VASCULAR:  pulses equal and symmetric in the upper and lower extremities    NEURO:  AAOX3, no focal neurological deficits, follows all commands, able to move extremities spontaneously    SKIN:  no ulcers, lesions or rashes    MEDICATIONS  (STANDING):  aspirin  chewable 81 milliGRAM(s) Oral daily  budesonide  80 MICROgram(s)/formoterol 4.5 MICROgram(s) Inhaler 2 Puff(s) Inhalation two times a day  dextrose 5%. 1000 milliLiter(s) (50 mL/Hr) IV Continuous <Continuous>  dextrose 50% Injectable 12.5 Gram(s) IV Push once  dextrose 50% Injectable 25 Gram(s) IV Push once  dextrose 50% Injectable 25 Gram(s) IV Push once  influenza   Vaccine 0.5 milliLiter(s) IntraMuscular once  losartan 100 milliGRAM(s) Oral daily  sodium chloride 0.9%. 1000 milliLiter(s) (125 mL/Hr) IV Continuous <Continuous>    MEDICATIONS  (PRN):  aluminum hydroxide/magnesium hydroxide/simethicone Suspension 30 milliLiter(s) Oral every 4 hours PRN Dyspepsia  dextrose 40% Gel 15 Gram(s) Oral once PRN Blood Glucose LESS THAN 70 milliGRAM(s)/deciliter  glucagon  Injectable 1 milliGRAM(s) IntraMuscular once PRN Glucose LESS THAN 70 milligrams/deciliter  HYDROmorphone  Injectable 1 milliGRAM(s) IV Push every 3 hours PRN Severe Pain (7 - 10)  morphine  - Injectable 4 milliGRAM(s) IV Push every 3 hours PRN Moderate Pain (4 - 6)  ondansetron Injectable 4 milliGRAM(s) IV Push every 6 hours PRN Nausea      Urinalysis Basic - ( 09 Sep 2020 16:53 )    Color: Yellow / Appearance: Clear / S.015 / pH: x  Gluc: x / Ketone: Negative  / Bili: Negative / Urobili: Negative mg/dL   Blood: x / Protein: 30 mg/dL / Nitrite: Negative   Leuk Esterase: Negative / RBC: 3-5 /HPF / WBC 0-2   Sq Epi: x / Non Sq Epi: Occasional / Bacteria: Negative    09 Sep 2020 12:25    141    |  110    |  14     ----------------------------<  125    4.0     |  27     |  1.39     Ca    9.1        09 Sep 2020 12:25    TPro  7.2    /  Alb  3.1    /  TBili  0.4    /  DBili  x      /  AST  69     /  ALT  116    /  AlkPhos  173    09 Sep 2020 12:25  LIVER FUNCTIONS - ( 09 Sep 2020 12:25 )  Alb: 3.1 g/dL / Pro: 7.2 gm/dL / ALK PHOS: 173 U/L / ALT: 116 U/L / AST: 69 U/L / GGT: x         PT/INR - ( 09 Sep 2020 12:25 )   PT: 13.6 sec;   INR: 1.17 ratio         PTT - ( 09 Sep 2020 12:25 )  PTT:33.1 secCBC Full  -  ( 09 Sep 2020 12: )  WBC Count : 7.87 K/uL  Hemoglobin : 13.4 g/dL  Hematocrit : 41.6 %  Platelet Count - Automated : 303 K/uL  Mean Cell Volume : 82.9 fl  Mean Cell Hemoglobin : 26.7 pg  Mean Cell Hemoglobin Concentration : 32.2 gm/dL  Auto Neutrophil # : 4.84 K/uL  Auto Lymphocyte # : 1.82 K/uL  Auto Monocyte # : 0.82 K/uL  Auto Eosinophil # : 0.35 K/uL  Auto Basophil # : 0.02 K/uL        Blood, Urine: Trace ( @ 16:53)      PT/INR - ( 09 Sep 2020 12:25 )   PT: 13.6 sec;   INR: 1.17 ratio         PTT - ( 09 Sep 2020 12:25 )  PTT:33.1 sec    RADIOLOGY RESULTS:          Assessment and Plan:      60 y/o M with PMHx of asthma, DM, GERD, HTN, and s/p appendectomy presents to the ED c/o worsening +R testicular pain. Was seen in ED on 9/3 for same complaint and was d/c on  to f/u with urologist Dr. Grider. Went to urologist today for increasing R testicular pain and was found to have +infarcts to testicle. Sent for admission, pain management, and medical clearance for procedure to remove R testicle tomorrow. No fever. Allergic to penicillins. PCP: Dr. Victor Manuel Park.    He was dx with DM in , told was type 2 but later on he was labeled as type 1. He is using an automated  insulin pump with Humalog, no GAMEZ at bedtime.       * Testicular infarct  OR today  prn analgesia    * T1DM on insulin pump  instructed not to use insulin after midnight in doses previously used for overnight coverage  -hba1c 8.3; patient will need to be directed to a Endocrinologist outpatient for bettter insulin control.     * HTN resume Losartan    *DVT prophy- encourage early ambulation post op with food. No ibuprofen, Advil, Aleve, Motrin with these medications. Discussed about side effects and use of the medications. Take tizanidine 4 mg at night for 5 nights. Exercises. Hold physical therapy. Check x-rays.     Mixed hyperlipidemia Check blood w

## 2021-10-29 ENCOUNTER — APPOINTMENT (OUTPATIENT)
Dept: DISASTER EMERGENCY | Facility: CLINIC | Age: 61
End: 2021-10-29

## 2021-10-29 LAB — SARS-COV-2 N GENE NPH QL NAA+PROBE: NOT DETECTED

## 2021-10-31 ENCOUNTER — TRANSCRIPTION ENCOUNTER (OUTPATIENT)
Age: 61
End: 2021-10-31

## 2021-11-01 ENCOUNTER — RESULT REVIEW (OUTPATIENT)
Age: 61
End: 2021-11-01

## 2021-11-01 ENCOUNTER — APPOINTMENT (OUTPATIENT)
Dept: SURGERY | Facility: HOSPITAL | Age: 61
End: 2021-11-01
Payer: COMMERCIAL

## 2021-11-01 ENCOUNTER — OUTPATIENT (OUTPATIENT)
Dept: INPATIENT UNIT | Facility: HOSPITAL | Age: 61
LOS: 1 days | End: 2021-11-01
Payer: COMMERCIAL

## 2021-11-01 VITALS
RESPIRATION RATE: 17 BRPM | OXYGEN SATURATION: 96 % | HEIGHT: 70 IN | TEMPERATURE: 98 F | SYSTOLIC BLOOD PRESSURE: 171 MMHG | WEIGHT: 266.98 LBS | HEART RATE: 83 BPM | DIASTOLIC BLOOD PRESSURE: 96 MMHG

## 2021-11-01 DIAGNOSIS — K60.3 ANAL FISTULA: Chronic | ICD-10-CM

## 2021-11-01 DIAGNOSIS — K82.4 CHOLESTEROLOSIS OF GALLBLADDER: ICD-10-CM

## 2021-11-01 DIAGNOSIS — Z98.890 OTHER SPECIFIED POSTPROCEDURAL STATES: Chronic | ICD-10-CM

## 2021-11-01 DIAGNOSIS — Z90.49 ACQUIRED ABSENCE OF OTHER SPECIFIED PARTS OF DIGESTIVE TRACT: Chronic | ICD-10-CM

## 2021-11-01 DIAGNOSIS — Z90.79 ACQUIRED ABSENCE OF OTHER GENITAL ORGAN(S): Chronic | ICD-10-CM

## 2021-11-01 DIAGNOSIS — K21.9 GASTRO-ESOPHAGEAL REFLUX DISEASE WITHOUT ESOPHAGITIS: ICD-10-CM

## 2021-11-01 LAB
ALBUMIN SERPL ELPH-MCNC: 3.8 G/DL — SIGNIFICANT CHANGE UP (ref 3.3–5)
ALP SERPL-CCNC: 134 U/L — HIGH (ref 40–120)
ALT FLD-CCNC: 42 U/L — SIGNIFICANT CHANGE UP (ref 10–45)
ANION GAP SERPL CALC-SCNC: 14 MMOL/L — SIGNIFICANT CHANGE UP (ref 5–17)
AST SERPL-CCNC: 41 U/L — HIGH (ref 10–40)
BILIRUB SERPL-MCNC: 0.2 MG/DL — SIGNIFICANT CHANGE UP (ref 0.2–1.2)
BUN SERPL-MCNC: 20 MG/DL — SIGNIFICANT CHANGE UP (ref 7–23)
CALCIUM SERPL-MCNC: 8.3 MG/DL — LOW (ref 8.4–10.5)
CHLORIDE SERPL-SCNC: 106 MMOL/L — SIGNIFICANT CHANGE UP (ref 96–108)
CO2 SERPL-SCNC: 19 MMOL/L — LOW (ref 22–31)
CREAT SERPL-MCNC: 1.35 MG/DL — HIGH (ref 0.5–1.3)
GLUCOSE BLDC GLUCOMTR-MCNC: 238 MG/DL — HIGH (ref 70–99)
GLUCOSE BLDC GLUCOMTR-MCNC: 252 MG/DL — HIGH (ref 70–99)
GLUCOSE BLDC GLUCOMTR-MCNC: 254 MG/DL — HIGH (ref 70–99)
GLUCOSE BLDC GLUCOMTR-MCNC: 269 MG/DL — HIGH (ref 70–99)
GLUCOSE SERPL-MCNC: 304 MG/DL — HIGH (ref 70–99)
HCT VFR BLD CALC: 43.1 % — SIGNIFICANT CHANGE UP (ref 39–50)
HGB BLD-MCNC: 13.7 G/DL — SIGNIFICANT CHANGE UP (ref 13–17)
LACTATE SERPL-SCNC: 1.8 MMOL/L — SIGNIFICANT CHANGE UP (ref 0.7–2)
MAGNESIUM SERPL-MCNC: 1.9 MG/DL — SIGNIFICANT CHANGE UP (ref 1.6–2.6)
MCHC RBC-ENTMCNC: 26.6 PG — LOW (ref 27–34)
MCHC RBC-ENTMCNC: 31.8 GM/DL — LOW (ref 32–36)
MCV RBC AUTO: 83.5 FL — SIGNIFICANT CHANGE UP (ref 80–100)
NRBC # BLD: 0 /100 WBCS — SIGNIFICANT CHANGE UP (ref 0–0)
PHOSPHATE SERPL-MCNC: 1.9 MG/DL — LOW (ref 2.5–4.5)
PLATELET # BLD AUTO: 267 K/UL — SIGNIFICANT CHANGE UP (ref 150–400)
POTASSIUM SERPL-MCNC: 4.6 MMOL/L — SIGNIFICANT CHANGE UP (ref 3.5–5.3)
POTASSIUM SERPL-SCNC: 4.6 MMOL/L — SIGNIFICANT CHANGE UP (ref 3.5–5.3)
PROT SERPL-MCNC: 6.3 G/DL — SIGNIFICANT CHANGE UP (ref 6–8.3)
RBC # BLD: 5.16 M/UL — SIGNIFICANT CHANGE UP (ref 4.2–5.8)
RBC # FLD: 13.9 % — SIGNIFICANT CHANGE UP (ref 10.3–14.5)
SODIUM SERPL-SCNC: 139 MMOL/L — SIGNIFICANT CHANGE UP (ref 135–145)
TROPONIN T, HIGH SENSITIVITY RESULT: 19 NG/L — SIGNIFICANT CHANGE UP (ref 0–51)
WBC # BLD: 14.35 K/UL — HIGH (ref 3.8–10.5)
WBC # FLD AUTO: 14.35 K/UL — HIGH (ref 3.8–10.5)

## 2021-11-01 PROCEDURE — 31575 DIAGNOSTIC LARYNGOSCOPY: CPT

## 2021-11-01 PROCEDURE — 99204 OFFICE O/P NEW MOD 45 MIN: CPT | Mod: 25

## 2021-11-01 PROCEDURE — 47562 LAPAROSCOPIC CHOLECYSTECTOMY: CPT

## 2021-11-01 PROCEDURE — 88304 TISSUE EXAM BY PATHOLOGIST: CPT | Mod: 26

## 2021-11-01 PROCEDURE — 93010 ELECTROCARDIOGRAM REPORT: CPT

## 2021-11-01 PROCEDURE — 47562 LAPAROSCOPIC CHOLECYSTECTOMY: CPT | Mod: 82

## 2021-11-01 PROCEDURE — 71045 X-RAY EXAM CHEST 1 VIEW: CPT | Mod: 26

## 2021-11-01 RX ORDER — DEXTROSE 50 % IN WATER 50 %
25 SYRINGE (ML) INTRAVENOUS ONCE
Refills: 0 | Status: DISCONTINUED | OUTPATIENT
Start: 2021-11-01 | End: 2021-11-15

## 2021-11-01 RX ORDER — SODIUM CHLORIDE 9 MG/ML
1000 INJECTION, SOLUTION INTRAVENOUS
Refills: 0 | Status: DISCONTINUED | OUTPATIENT
Start: 2021-11-01 | End: 2021-11-02

## 2021-11-01 RX ORDER — SODIUM CHLORIDE 9 MG/ML
1000 INJECTION, SOLUTION INTRAVENOUS
Refills: 0 | Status: DISCONTINUED | OUTPATIENT
Start: 2021-11-01 | End: 2021-11-15

## 2021-11-01 RX ORDER — ACETAMINOPHEN 500 MG
1000 TABLET ORAL ONCE
Refills: 0 | Status: COMPLETED | OUTPATIENT
Start: 2021-11-01 | End: 2021-11-01

## 2021-11-01 RX ORDER — DEXTROSE 50 % IN WATER 50 %
12.5 SYRINGE (ML) INTRAVENOUS ONCE
Refills: 0 | Status: DISCONTINUED | OUTPATIENT
Start: 2021-11-01 | End: 2021-11-01

## 2021-11-01 RX ORDER — INSULIN LISPRO 100/ML
VIAL (ML) SUBCUTANEOUS
Refills: 0 | Status: DISCONTINUED | OUTPATIENT
Start: 2021-11-01 | End: 2021-11-01

## 2021-11-01 RX ORDER — INSULIN LISPRO 100/ML
6 VIAL (ML) SUBCUTANEOUS
Refills: 0 | Status: DISCONTINUED | OUTPATIENT
Start: 2021-11-01 | End: 2021-11-01

## 2021-11-01 RX ORDER — LOSARTAN POTASSIUM 100 MG/1
1 TABLET, FILM COATED ORAL
Qty: 0 | Refills: 0 | DISCHARGE

## 2021-11-01 RX ORDER — DEXTROSE 50 % IN WATER 50 %
25 SYRINGE (ML) INTRAVENOUS ONCE
Refills: 0 | Status: DISCONTINUED | OUTPATIENT
Start: 2021-11-01 | End: 2021-11-01

## 2021-11-01 RX ORDER — INSULIN GLARGINE 100 [IU]/ML
24 INJECTION, SOLUTION SUBCUTANEOUS
Qty: 0 | Refills: 0 | DISCHARGE

## 2021-11-01 RX ORDER — HYDROCORTISONE 20 MG
100 TABLET ORAL ONCE
Refills: 0 | Status: COMPLETED | OUTPATIENT
Start: 2021-11-01 | End: 2021-11-01

## 2021-11-01 RX ORDER — OXYCODONE HYDROCHLORIDE 5 MG/1
5 TABLET ORAL EVERY 4 HOURS
Refills: 0 | Status: DISCONTINUED | OUTPATIENT
Start: 2021-11-01 | End: 2021-11-01

## 2021-11-01 RX ORDER — INSULIN LISPRO 100/ML
6 VIAL (ML) SUBCUTANEOUS
Qty: 0 | Refills: 0 | DISCHARGE

## 2021-11-01 RX ORDER — SODIUM CHLORIDE 9 MG/ML
3 INJECTION INTRAMUSCULAR; INTRAVENOUS; SUBCUTANEOUS EVERY 8 HOURS
Refills: 0 | Status: DISCONTINUED | OUTPATIENT
Start: 2021-11-01 | End: 2021-11-01

## 2021-11-01 RX ORDER — DEXTROSE 50 % IN WATER 50 %
15 SYRINGE (ML) INTRAVENOUS ONCE
Refills: 0 | Status: DISCONTINUED | OUTPATIENT
Start: 2021-11-01 | End: 2021-11-01

## 2021-11-01 RX ORDER — POTASSIUM PHOSPHATE, MONOBASIC POTASSIUM PHOSPHATE, DIBASIC 236; 224 MG/ML; MG/ML
30 INJECTION, SOLUTION INTRAVENOUS ONCE
Refills: 0 | Status: COMPLETED | OUTPATIENT
Start: 2021-11-01 | End: 2021-11-01

## 2021-11-01 RX ORDER — OXYCODONE HYDROCHLORIDE 5 MG/1
10 TABLET ORAL EVERY 4 HOURS
Refills: 0 | Status: DISCONTINUED | OUTPATIENT
Start: 2021-11-01 | End: 2021-11-01

## 2021-11-01 RX ORDER — MOMETASONE FUROATE AND FORMOTEROL FUMARATE DIHYDRATE 200; 5 UG/1; UG/1
2 AEROSOL RESPIRATORY (INHALATION)
Qty: 0 | Refills: 0 | DISCHARGE

## 2021-11-01 RX ORDER — INSULIN LISPRO 100/ML
1 VIAL (ML) SUBCUTANEOUS
Refills: 0 | Status: DISCONTINUED | OUTPATIENT
Start: 2021-11-01 | End: 2021-11-15

## 2021-11-01 RX ORDER — FAMOTIDINE 10 MG/ML
20 INJECTION INTRAVENOUS ONCE
Refills: 0 | Status: COMPLETED | OUTPATIENT
Start: 2021-11-01 | End: 2021-11-01

## 2021-11-01 RX ORDER — MAGNESIUM SULFATE 500 MG/ML
2 VIAL (ML) INJECTION ONCE
Refills: 0 | Status: COMPLETED | OUTPATIENT
Start: 2021-11-01 | End: 2021-11-01

## 2021-11-01 RX ORDER — INSULIN LISPRO 100/ML
VIAL (ML) SUBCUTANEOUS AT BEDTIME
Refills: 0 | Status: DISCONTINUED | OUTPATIENT
Start: 2021-11-01 | End: 2021-11-01

## 2021-11-01 RX ORDER — SODIUM CHLORIDE 9 MG/ML
1000 INJECTION, SOLUTION INTRAVENOUS
Refills: 0 | Status: DISCONTINUED | OUTPATIENT
Start: 2021-11-01 | End: 2021-11-01

## 2021-11-01 RX ORDER — PYRIDOXINE HCL (VITAMIN B6) 100 MG
1 TABLET ORAL
Qty: 0 | Refills: 0 | DISCHARGE

## 2021-11-01 RX ORDER — INSULIN GLARGINE 100 [IU]/ML
34 INJECTION, SOLUTION SUBCUTANEOUS AT BEDTIME
Refills: 0 | Status: DISCONTINUED | OUTPATIENT
Start: 2021-11-01 | End: 2021-11-01

## 2021-11-01 RX ORDER — DEXTROSE 50 % IN WATER 50 %
15 SYRINGE (ML) INTRAVENOUS ONCE
Refills: 0 | Status: DISCONTINUED | OUTPATIENT
Start: 2021-11-01 | End: 2021-11-15

## 2021-11-01 RX ORDER — FAMOTIDINE 10 MG/ML
1 INJECTION INTRAVENOUS
Qty: 0 | Refills: 0 | DISCHARGE

## 2021-11-01 RX ORDER — LIDOCAINE HCL 20 MG/ML
0.2 VIAL (ML) INJECTION ONCE
Refills: 0 | Status: DISCONTINUED | OUTPATIENT
Start: 2021-11-01 | End: 2021-11-01

## 2021-11-01 RX ORDER — HYDROMORPHONE HYDROCHLORIDE 2 MG/ML
0.25 INJECTION INTRAMUSCULAR; INTRAVENOUS; SUBCUTANEOUS ONCE
Refills: 0 | Status: DISCONTINUED | OUTPATIENT
Start: 2021-11-01 | End: 2021-11-01

## 2021-11-01 RX ORDER — FENTANYL CITRATE 50 UG/ML
50 INJECTION INTRAVENOUS
Refills: 0 | Status: DISCONTINUED | OUTPATIENT
Start: 2021-11-01 | End: 2021-11-02

## 2021-11-01 RX ORDER — HYDROCORTISONE 20 MG
100 TABLET ORAL ONCE
Refills: 0 | Status: DISCONTINUED | OUTPATIENT
Start: 2021-11-01 | End: 2021-11-01

## 2021-11-01 RX ORDER — ENOXAPARIN SODIUM 100 MG/ML
40 INJECTION SUBCUTANEOUS EVERY 24 HOURS
Refills: 0 | Status: DISCONTINUED | OUTPATIENT
Start: 2021-11-01 | End: 2021-11-15

## 2021-11-01 RX ORDER — GLUCAGON INJECTION, SOLUTION 0.5 MG/.1ML
1 INJECTION, SOLUTION SUBCUTANEOUS ONCE
Refills: 0 | Status: DISCONTINUED | OUTPATIENT
Start: 2021-11-01 | End: 2021-11-15

## 2021-11-01 RX ORDER — OXYCODONE HYDROCHLORIDE 5 MG/1
1 TABLET ORAL
Qty: 8 | Refills: 0
Start: 2021-11-01

## 2021-11-01 RX ORDER — UBIDECARENONE 100 MG
100 CAPSULE ORAL
Qty: 0 | Refills: 0 | DISCHARGE

## 2021-11-01 RX ORDER — ONDANSETRON 8 MG/1
4 TABLET, FILM COATED ORAL EVERY 4 HOURS
Refills: 0 | Status: DISCONTINUED | OUTPATIENT
Start: 2021-11-01 | End: 2021-11-02

## 2021-11-01 RX ORDER — CHLORHEXIDINE GLUCONATE 213 G/1000ML
1 SOLUTION TOPICAL ONCE
Refills: 0 | Status: DISCONTINUED | OUTPATIENT
Start: 2021-11-01 | End: 2021-11-01

## 2021-11-01 RX ORDER — FENTANYL CITRATE 50 UG/ML
25 INJECTION INTRAVENOUS
Refills: 0 | Status: DISCONTINUED | OUTPATIENT
Start: 2021-11-01 | End: 2021-11-02

## 2021-11-01 RX ORDER — MOMETASONE FUROATE AND FORMOTEROL FUMARATE DIHYDRATE 200; 5 UG/1; UG/1
2 AEROSOL RESPIRATORY (INHALATION)
Refills: 0 | Status: DISCONTINUED | OUTPATIENT
Start: 2021-11-01 | End: 2021-11-15

## 2021-11-01 RX ORDER — DEXTROSE 50 % IN WATER 50 %
12.5 SYRINGE (ML) INTRAVENOUS ONCE
Refills: 0 | Status: DISCONTINUED | OUTPATIENT
Start: 2021-11-01 | End: 2021-11-15

## 2021-11-01 RX ORDER — EZETIMIBE 10 MG/1
1 TABLET ORAL
Qty: 0 | Refills: 0 | DISCHARGE

## 2021-11-01 RX ORDER — CHOLECALCIFEROL (VITAMIN D3) 125 MCG
0 CAPSULE ORAL
Qty: 0 | Refills: 0 | DISCHARGE

## 2021-11-01 RX ADMIN — Medication 400 MILLIGRAM(S): at 22:23

## 2021-11-01 RX ADMIN — Medication 50 GRAM(S): at 19:10

## 2021-11-01 RX ADMIN — Medication 100 MILLIGRAM(S): at 14:00

## 2021-11-01 RX ADMIN — Medication 1000 MILLIGRAM(S): at 23:02

## 2021-11-01 RX ADMIN — Medication 2: at 11:41

## 2021-11-01 RX ADMIN — SODIUM CHLORIDE 75 MILLILITER(S): 9 INJECTION, SOLUTION INTRAVENOUS at 17:30

## 2021-11-01 RX ADMIN — HYDROMORPHONE HYDROCHLORIDE 0.25 MILLIGRAM(S): 2 INJECTION INTRAMUSCULAR; INTRAVENOUS; SUBCUTANEOUS at 19:00

## 2021-11-01 RX ADMIN — SODIUM CHLORIDE 100 MILLILITER(S): 9 INJECTION, SOLUTION INTRAVENOUS at 23:29

## 2021-11-01 RX ADMIN — HYDROMORPHONE HYDROCHLORIDE 0.25 MILLIGRAM(S): 2 INJECTION INTRAMUSCULAR; INTRAVENOUS; SUBCUTANEOUS at 17:34

## 2021-11-01 RX ADMIN — Medication 2: at 16:23

## 2021-11-01 RX ADMIN — ONDANSETRON 4 MILLIGRAM(S): 8 TABLET, FILM COATED ORAL at 11:15

## 2021-11-01 RX ADMIN — POTASSIUM PHOSPHATE, MONOBASIC POTASSIUM PHOSPHATE, DIBASIC 83.33 MILLIMOLE(S): 236; 224 INJECTION, SOLUTION INTRAVENOUS at 20:02

## 2021-11-01 RX ADMIN — MOMETASONE FUROATE AND FORMOTEROL FUMARATE DIHYDRATE 2 PUFF(S): 200; 5 AEROSOL RESPIRATORY (INHALATION) at 23:31

## 2021-11-01 RX ADMIN — Medication 1000 MILLIGRAM(S): at 17:05

## 2021-11-01 RX ADMIN — FENTANYL CITRATE 25 MICROGRAM(S): 50 INJECTION INTRAVENOUS at 12:05

## 2021-11-01 RX ADMIN — FAMOTIDINE 20 MILLIGRAM(S): 10 INJECTION INTRAVENOUS at 16:38

## 2021-11-01 RX ADMIN — ENOXAPARIN SODIUM 40 MILLIGRAM(S): 100 INJECTION SUBCUTANEOUS at 22:23

## 2021-11-01 RX ADMIN — Medication 400 MILLIGRAM(S): at 16:37

## 2021-11-01 RX ADMIN — FENTANYL CITRATE 25 MICROGRAM(S): 50 INJECTION INTRAVENOUS at 12:30

## 2021-11-01 NOTE — PRE-ANESTHESIA EVALUATION ADULT - NSANTHADDINFOFT_GEN_ALL_CORE
Given that patient took Lantus last night, took 6 units of Humalog this AM, and there is a general downtrend in blood glucose (>300 --> 280 --> 238), will defer treating hypoglycemia for now. Also discussed risk of asthma exacerbation intra-op and potential for prolonged intubation if that happens, patient seems to be optimized and sounds clear today. Agrees with proceeding

## 2021-11-01 NOTE — CONSULT NOTE ADULT - ASSESSMENT
61y with questionable soft tissue mass in posterior oropharynx. No masses noted on oral exam, small torus palatinus noted, indirect laryngoscopy with posterior arytenoid edema noted on indirect laryngoscopy, likely 2/2 GERD

## 2021-11-01 NOTE — PRE-ANESTHESIA EVALUATION ADULT - NSANTHPMHFT_GEN_ALL_CORE
62yo M h/o gall stones here for laparoscopic cholecystectomy  CHERELLE on CPAP  Asthma, exacerbation last month s/p course of steroids, will get hydrocortisone in OR  T1DM on insulin pump (removed for procedure), s/p Lantus last night and Humalog this AM, FS in pre-op 238 but downtrending, will follow  HTN, good ET  GERD, none right now  No known liver / kidney disease, no blood thinners

## 2021-11-01 NOTE — CONSULT NOTE ADULT - SUBJECTIVE AND OBJECTIVE BOX
ENT ISSUE/POD: ? mass on posterior oropharynx.     HPI: 61y Obese male with PMH of Diabetes type 1 has  insulin pump, asthma, TN, HLD CHERELLE cpap use, GERD with gall stones in or for gabrielle. As per anesthesia, during intubation, possible soft tissue mass on posterior oropharynx. intubation uncomplicated, no bleeding or trauma. PT admits to following out pt with Dr. Arriola for acid reflux, no known ent mass, no dysphagia, odynophagia, dysphonia, sob, dyspnea, changes in voice or inability to tolerated secretions     PAST MEDICAL & SURGICAL HISTORY:  HTN (hypertension)    GERD (gastroesophageal reflux disease)    Asthma  current exacerbation, improved on Prednisone and Mucinex , followed by Pulm    CHERELLE on CPAP    History of type 1 diabetes mellitus  1990&#x27;s on insulin pump    Hyperlipidemia    Obesity    Gall bladder polyp    S/P appendectomy  age 6    History of lumpectomy  axilla 2005    History of orchiectomy  9/2020    History of ptosis repair  right childhood congenital    Anal fistula  3/2021 abscess drained with seton placement and 5/2021      Allergies    penicillins (Rash)    Intolerances      MEDICATIONS  (STANDING):  ciprofloxacin   IVPB 400 milliGRAM(s) IV Intermittent once  clindamycin IVPB 900 milliGRAM(s) IV Intermittent once  dextrose 40% Gel 15 Gram(s) Oral once  dextrose 5%. 1000 milliLiter(s) (50 mL/Hr) IV Continuous <Continuous>  dextrose 5%. 1000 milliLiter(s) (100 mL/Hr) IV Continuous <Continuous>  dextrose 50% Injectable 25 Gram(s) IV Push once  dextrose 50% Injectable 12.5 Gram(s) IV Push once  dextrose 50% Injectable 25 Gram(s) IV Push once  glucagon  Injectable 1 milliGRAM(s) IntraMuscular once  insulin lispro (ADMELOG) corrective regimen sliding scale   SubCutaneous Before meals and at bedtime    MEDICATIONS  (PRN):  fentaNYL    Injectable 25 MICROGram(s) IV Push every 5 minutes PRN Moderate Pain (4 - 6)  fentaNYL    Injectable 50 MICROGram(s) IV Push every 10 minutes PRN Severe Pain (7 - 10)  ondansetron Injectable 4 milliGRAM(s) IV Push every 4 hours PRN Nausea and/or Vomiting      social history: see consult     family history: see consult     ROS:   ENT: all negative except as noted in HPI   Pulm: denies SOB, cough, hemoptysis  Neuro: denies numbness/tingling, loss of sensation  Endo: denies heat/cold intolerance, excessive sweating      Vital Signs Last 24 Hrs  T(C): 36.8 (01 Nov 2021 13:15), Max: 36.8 (01 Nov 2021 13:15)  T(F): 98.2 (01 Nov 2021 13:15), Max: 98.2 (01 Nov 2021 13:15)  HR: 103 (01 Nov 2021 14:00) (83 - 106)  BP: 155/70 (01 Nov 2021 14:00) (132/59 - 175/87)  BP(mean): 101 (01 Nov 2021 14:00) (91 - 123)  RR: 16 (01 Nov 2021 14:00) (12 - 18)  SpO2: 100% (01 Nov 2021 14:00) (96% - 100%)              PHYSICAL EXAM:  Gen: NAD  Skin: No rashes, bruises, or lesions  Head: Normocephalic, Atraumatic  Face: no edema, erythema, or fluctuance. Parotid glands soft without mass  Eyes: no scleral injection  Nose: Nares bilaterally patent, no discharge  Mouth: No Stridor / Drooling / Trismus.  Mucosa moist, tongue/uvula midline, oropharynx clear, small torus palatinus   Neck: Flat, supple, no lymphadenopathy, trachea midline, no masses  Lymphatic: No lymphadenopathy  Resp: breathing easily, no stridor  Neuro: facial nerve intact, no facial droop        indirect laryngoscopy- Reason for Laryngoscopy:    Patient was unable to cooperate with mirror.  Nasopharynx, oropharynx, and hypopharynx clear, no bleeding. Tongue base, posterior pharyngeal wall, vallecula, epiglottis, and subglottis appear normal. No erythema, edema, pooling of secretions, masses or lesions. Airway patent, no foreign body visualized. No glottic/supraglottic edema. True vocal cords, arytenoids, vestibular folds, ventricles, pyriform sinuses, and aryepiglottic folds appear normal bilaterally. Vocal cords mobile with good contact b/l. . posterior arytenoid edema noted on indirect laryngoscopy, likely 2/2 GERD

## 2021-11-01 NOTE — ASU DISCHARGE PLAN (ADULT/PEDIATRIC) - COMMENTS
follow up with your endocrinologist  Dr. Arredondo in 1 week, resume insulin pump as instructed by Dr. Arredondo, call Dr. Arredondo for any issues with insulin pump   follow up with your ENT Dr. Arriola in 1 week please call to schedule an appointment  please follow up with your pulmonologist Dr. Gray in 1 week, continue steroid taper as per Dr. Gray    med

## 2021-11-01 NOTE — CONSULT NOTE ADULT - ATTENDING COMMENTS
normal laryngoscopy, no masses seen, no trauma seen.    keep f/up with Dr. Arriola  no intervention recommended

## 2021-11-01 NOTE — CONSULT NOTE ADULT - ASSESSMENT
ASSESSMENT:    s/p cholecystectomy for a gallbladder polyp - post-operative nausea/vomiting due to after effects of anesthesia and not unexpected abdominal discomfort    h/o cough variant asthma s/p a recent exacerbation requiring a prolonged course of systemic steroids    obesity related obstructive sleep apnea    HTN/DM/HLD     GERD (gastroesophageal reflux disease)    PLAN/RECOMMENDATIONS:    oxygen supplementation as needed to keep saturation greater than 92%  observe closely in a monitored setting for signs/symptoms of hypercapnia - check ABG if patient remains somnolent  hydrocortisone 100mg IVP on call to OR and 6 hours after OR  nocturnal and as needed CPAP for daytime naps  multi-modal pain control\incentive spirometry - avoiding narcotic analgesics as able  symbicort/albuterol (substitute for dulera)  dexilant/pepcid  glucose control  cozaar/ASA    Thank you for the courtesy of this referral. Plan of care discussed with the patient and the PACU RN at bedside     Sergio Bravo MD, [facility]  [phone]  Pulmonary Medicine   ASSESSMENT:    s/p cholecystectomy for a gallbladder polyp - post-operative nausea/vomiting due to after effects of anesthesia and not unexpected abdominal discomfort    h/o cough variant asthma s/p a recent exacerbation requiring a prolonged course of systemic steroids    obesity related obstructive sleep apnea    HTN/DM/HLD     GERD (gastroesophageal reflux disease)    PLAN/RECOMMENDATIONS:    oxygen supplementation as needed to keep saturation greater than 92%  observe closely in a monitored setting for signs/symptoms of hypercapnia - check ABG if the patient remains somnolent  hydrocortisone 100mg IVP on call to OR and 6 hours after OR  nocturnal and as needed CPAP for daytime naps  multi-modal pain control - avoiding narcotic analgesics as able  hourly use of an incentive spirometer   symbicort/albuterol (substitute for dulera)  dexilant/pepcid  glucose control  cozaar/ASA    Thank you for the courtesy of this referral. Plan of care discussed with the patient and the PACU RN at bedside     Sergio Bravo MD, [facility]  [phone]  Pulmonary Medicine

## 2021-11-01 NOTE — CONSULT NOTE ADULT - PROBLEM SELECTOR RECOMMENDATION 9
- continue with gerd meds  - continue f/u with Dr. Arriola out pt   - no further ent intervention at this time   - call prn

## 2021-11-01 NOTE — ASU DISCHARGE PLAN (ADULT/PEDIATRIC) - CARE PROVIDER_API CALL
Rose Abad)  ColonRectal Surgery; Surgery  [address], [address]  [place] [address]  Phone: [phone]  Fax: [phone]  Follow Up Time: 2 weeks   Rose Abad)  ColonRectal Surgery; Surgery  [address], [address]  [place] [address]  Phone: [phone]  Fax: [phone]  Follow Up Time: 2 weeks    Mango Arriola)  Otolaryngology  [address], [address]  [place] [address]  Phone: [phone]  Fax: [phone]  Follow Up Time: 1 week    Spencer Arredondo)  EndocrinologyMetabDiabetes; Internal Medicine  [address], [address]  [place] [address]  Phone: [phone]  Fax: [phone]  Follow Up Time: 1 week    Victor Manuel Park)  Cardiology; Internal Medicine  [address], [address]  [place] [address]  Phone: [phone]  Fax: [phone]  Follow Up Time: 1 week    Sergio Gray  INTERNAL MEDICINE  [facility], [address] [place] [address]  Phone: [phone]  Fax: [phone]  Follow Up Time: 1 week

## 2021-11-01 NOTE — CHART NOTE - NSCHARTNOTEFT_GEN_A_CORE
Received a call this evening about this patient regarding insulin pump management. Patient follows with private Endocrinologist Dr. Arredondo. Spoke with Endocrine faculty attending Dr. Burgos ([facility]) who was contacted pre-op about this patient and advised the team to reach out to Dr. Arredondo for guidance/pump management. I asked the resident of the primary team this evening to reach out to Dr. Arredondo for guidance as this patient follows with him. I also sent an email to SUZANNE Toscano requesting additional pump support.    Triston Min DO, Endocrinology Fellow  Pager [phone] from 9am to 5pm. After hours and on weekends, please call [phone].
Post Operative Note  Patient: HELADIO PEDERSON 61y (1960) Male   MRN: [id number]  [facility]: [facility] PACU 27  Visit: 11-01-21 Outpatient  Date: 11-01-21 @ 19:16    Procedure: S/P laparoscopic cholecystectomy    Subjective:   Patient examined early in the afternoon and again @ 1800. Reports feeling subsequently better. However, only tolerating several ice chips. Nausea has somewhat resolved, but had emesis early postop. Reports mid-epigastric pain. Afebrile, mildly tachycardic throughout during the immediate postop stay. Had an indirect laryngoscopy by ENT immediately postop due to difficult airway.     Objective:  Vitals: T(F): 98.6 (11-01-21 @ 17:00), Max: 98.6 (11-01-21 @ 17:00)  HR: 111 (11-01-21 @ 18:30)  BP: 148/63 (11-01-21 @ 18:30) (132/59 - 175/87)  RR: 16 (11-01-21 @ 18:30)  SpO2: 98% (11-01-21 @ 18:30)  Vent Settings:     In:   11-01-21 @ 07:01  -  11-01-21 @ 19:16  --------------------------------------------------------  IN: 275 mL      IV Fluids: dextrose 5%. 1000 milliLiter(s) (50 mL/Hr) IV Continuous <Continuous>  dextrose 5%. 1000 milliLiter(s) (100 mL/Hr) IV Continuous <Continuous>  lactated ringers. 1000 milliLiter(s) (100 mL/Hr) IV Continuous <Continuous>  potassium phosphate IVPB 30 milliMole(s) IV Intermittent once      Out:   11-01-21 @ 07:01  -  11-01-21 @ 19:16  --------------------------------------------------------  OUT: 725 mL      EBL: 10cc    Voided Urine:   11-01-21 @ 07:01  -  11-01-21 @ 19:16  --------------------------------------------------------  OUT: 725 mL      Kim Catheter: no           Physical Examination:  General: NAD, nauseous, in moderate pain  HEENT: Normocephalic atraumatic  Respiratory: Nonlabored respirations, normal CW expansion.  Cardio: regular rate and rhythm.  Abdomen: softly distended, appropriately tender, surgical incisions are c/d/i.   Vascular: extremities are warm and well perfused.     Imaging:  No post-op imaging studies    Assessment:  61yMale patient S/P laparoscopic cholecystectomy for gallbladder polyp. Nauseous with several episodes of emesis. Tachycardic to 100s, and ongoing mid-epigastric pain. PACU labs, EKG and CXR obtained and reviewed.     Plan:  - EKG w/ sinus tachycardia and minimal, non-significant, T wave inversions in leads V-VI, troponins negative   - H&H stable, Lactate WNL, electrolytes repleted   - Pain control PRN  - Diet: low fat   - Activity: as tolerated   - DVT ppx: LVX   - Endo consult for insulin pump interrogation  - Home CPAP overnight  - Pulm Consult, appreciate recs   - ENT Consult, appreciate recs     Patient seen and discussed with Senior Resident, Dr. Vaughan and Chief Resident, Dr. Micki Dang Surgery  [id number]

## 2021-11-01 NOTE — ASU DISCHARGE PLAN (ADULT/PEDIATRIC) - PROVIDER TOKENS
PROVIDER:[TOKEN:[[id number]],FOLLOWUP:[2 weeks]] PROVIDER:[TOKEN:[[id number]],FOLLOWUP:[2 weeks]],PROVIDER:[TOKEN:[[id number]],FOLLOWUP:[1 week]],PROVIDER:[TOKEN:[[id number]],FOLLOWUP:[1 week]],PROVIDER:[TOKEN:[[id number]],FOLLOWUP:[1 week]],PROVIDER:[TOKEN:[[id number]],FOLLOWUP:[1 week]]

## 2021-11-01 NOTE — CONSULT NOTE ADULT - SUBJECTIVE AND OBJECTIVE BOX
[facility] - [facility] - CONSULT NOTE    HPI: 61 year old gentleman, lifelong non-smoker, followed by Dr. Sergio Grya of our practice for cough variant asthma and obstructive sleep apnea. The patient also has a history of HTN, HLD, DM and GERD. The patient had a severe asthma exacerbation ~ 6 weeks ago treated with a prolonged course of steroids that was completed on 10/21. He now has no cough, sputum production, hemoptysis, chest congestion or wheeze. No fevers, chills or sweats. No chest pain/pressure or palpitations. The patient is now in the recovery room following an uncomplicated laparoscopic cholecystectomy    PMHX:  Asthma  AR (allergic rhinitis)  Obesity  CHERELLE on CPAP  HTN (hypertension)  DM (diabetes mellitus)  HLD (hyperlipidemia)  GERD (gastroesophageal reflux disease)  Gallbladder polyp  Testicular torsion    PSHX:  Appendectomy  Lumpectomy  Orchiectomy (testicular infarction)  Ptosis repair  Anal fistula repair    FAMILY HISTORY:  mother - CAD - COPD - emphysema  father - Alzheimer's dementia    SOCIAL HISTORY:  lifelong non-smoker    Pulmonary Medications:       Antimicrobials:  ciprofloxacin   IVPB 400 milliGRAM(s) IV Intermittent once  clindamycin IVPB 900 milliGRAM(s) IV Intermittent once      Cardiology:      Other:  dextrose 40% Gel 15 Gram(s) Oral once  dextrose 5%. 1000 milliLiter(s) IV Continuous <Continuous>  dextrose 5%. 1000 milliLiter(s) IV Continuous <Continuous>  dextrose 50% Injectable 25 Gram(s) IV Push once  dextrose 50% Injectable 12.5 Gram(s) IV Push once  dextrose 50% Injectable 25 Gram(s) IV Push once  glucagon  Injectable 1 milliGRAM(s) IntraMuscular once  hydrocortisone sodium succinate Injectable 100 milliGRAM(s) IV Push once  insulin lispro (ADMELOG) corrective regimen sliding scale   SubCutaneous Before meals and at bedtime      Prn:  MEDICATIONS  (PRN):  fentaNYL    Injectable 25 MICROGram(s) IV Push every 5 minutes PRN Moderate Pain (4 - 6)  fentaNYL    Injectable 50 MICROGram(s) IV Push every 10 minutes PRN Severe Pain (7 - 10)  ondansetron Injectable 4 milliGRAM(s) IV Push every 4 hours PRN Nausea and/or Vomiting      Allergies    penicillins (Rash)    HOME MEDICATIONS: dulera - albuterol MDI as needed - dexilant - pepcid - zetia - insulin - cozaar    REVIEW OF SYSTEMS:  Constitutional: As per HPI  HEENT: Within normal limits  CV: As per HPI  Resp: As per HPI  GI: Within normal limits   : Within normal limits  Musculoskeletal: Within normal limits  Skin: Within normal limits  Neurological: Within normal limits  Psychiatric: Within normal limits  Endocrine: Within normal limits  Hematologic/Lymphatic: Within normal limits  Allergic/Immunologic: Within normal limits    [x] All other systems negative    OBJECTIVE:    I&O's Detail    01 Nov 2021 07:01  -  01 Nov 2021 13:28  --------------------------------------------------------  IN:  Total IN: 0 mL    OUT:    Voided (mL): 350 mL  Total OUT: 350 mL    Total NET: -350 mL        Daily Height in cm: 177.8 (01 Nov 2021 07:23)    Daily   POCT Blood Glucose.: 252 mg/dL (01 Nov 2021 11:02)  POCT Blood Glucose.: 238 mg/dL (01 Nov 2021 06:13)      PHYSICAL EXAM:  ICU Vital Signs Last 24 Hrs  T(C): 36.8 (01 Nov 2021 13:15), Max: 36.8 (01 Nov 2021 13:15)  T(F): 98.2 (01 Nov 2021 13:15), Max: 98.2 (01 Nov 2021 13:15)  HR: 100 (01 Nov 2021 13:15) (83 - 106)  BP: 148/63 (01 Nov 2021 13:15) (132/59 - 175/87)  BP(mean): 91 (01 Nov 2021 13:15) (91 - 123)  ABP: --  ABP(mean): --  RR: 16 (01 Nov 2021 13:15) (12 - 18)  SpO2: 100% (01 Nov 2021 13:15) (96% - 100%)    General: Awake. Alert. Cooperative. No distress. Appears stated age 	  HEENT:   Atraumatic. Normocephalic. Anicteric. Normal oral mucosa. PERRL. EOMI.  Neck: Supple. Trachea midline. Thyroid without enlargement/tenderness/nodules. No carotid bruit. No JVD.	  Cardiovascular: Regular rate and rhythm. S1 S2 normal. No murmurs, rubs or gallops.  Respiratory: Respirations unlabored. Clear to auscultation and percussion bilaterally. No curvature.  Abdomen: Soft. Non-tender. Non-distended. No organomegaly. No masses. Normal bowel sounds.  Extremities: Warm to touch. No clubbing or cyanosis. No pedal edema.  Pulses: 2+ peripheral pulses all extremities.	  Skin: Normal skin color. No rashes or lesions. No ecchymoses. No cyanosis. Warm to touch.  Lymph Nodes: Cervical, supraclavicular and axillary nodes normal  Neurological: Motor and sensory examination equal and normal. A and O x 3  Psychiatry: Appropriate mood and affect.      LABS:      CBC    WBC      Hemoglobin      Hematocrit      Platelets      LYTES    sodium      potassium       chloride      carbon dioxide      =============================================================================================  RENAL FUNCTION:    Creatinine:       BUN:       ============================================================================================    calcium       phos       mag       ============================================================================================  LFTs    AST:       ALT:      AP:      Bili:                        MICROBIOLOGY:       RADIOLOGY:  [x ] Chest radiographs reviewed and interpreted by me       [facility] - [facility] - CONSULT NOTE    HPI: 61 year old gentleman, lifelong non-smoker, followed by Dr. Sergio Gray of our practice for cough variant asthma and obstructive sleep apnea. The patient also has a history of HTN, HLD, DM, GERD and a gallbladder polyp. The patient had a severe asthma exacerbation ~ 6 weeks ago treated with a prolonged course of steroids that was completed on 10/21. The patient is now in the recovery room following an uncomplicated laparoscopic cholecystectomy. He has no shortness of breath on room air. He has no cough, sputum production, hemoptysis, chest congestion or wheeze. No fevers, chills or sweats. No chest pain/pressure or palpitations. Since arrival in the PACU, the patient has been nauseated and has vomited on two occasions. He has severe epigastric pain. He is cold and under multiple warm blankets. Asked to evaluate    PMHX:  Asthma  AR (allergic rhinitis)  Obesity  CHERELLE on CPAP  HTN (hypertension)  DM (diabetes mellitus)  HLD (hyperlipidemia)  GERD (gastroesophageal reflux disease)  Gallbladder polyp  Testicular torsion    PSHX:  Appendectomy  Lumpectomy  Orchiectomy (testicular infarction)  Ptosis repair  Anal fistula repair    FAMILY HISTORY:  mother - CAD - COPD - emphysema  father - Alzheimer's dementia    SOCIAL HISTORY:  lifelong non-smoker    Pulmonary Medications:       Antimicrobials:  ciprofloxacin   IVPB 400 milliGRAM(s) IV Intermittent once  clindamycin IVPB 900 milliGRAM(s) IV Intermittent once      Cardiology:      Other:  dextrose 40% Gel 15 Gram(s) Oral once  dextrose 5%. 1000 milliLiter(s) IV Continuous <Continuous>  dextrose 5%. 1000 milliLiter(s) IV Continuous <Continuous>  dextrose 50% Injectable 25 Gram(s) IV Push once  dextrose 50% Injectable 12.5 Gram(s) IV Push once  dextrose 50% Injectable 25 Gram(s) IV Push once  glucagon  Injectable 1 milliGRAM(s) IntraMuscular once  hydrocortisone sodium succinate Injectable 100 milliGRAM(s) IV Push once  insulin lispro (ADMELOG) corrective regimen sliding scale   SubCutaneous Before meals and at bedtime      Prn:  MEDICATIONS  (PRN):  fentaNYL    Injectable 25 MICROGram(s) IV Push every 5 minutes PRN Moderate Pain (4 - 6)  fentaNYL    Injectable 50 MICROGram(s) IV Push every 10 minutes PRN Severe Pain (7 - 10)  ondansetron Injectable 4 milliGRAM(s) IV Push every 4 hours PRN Nausea and/or Vomiting      Allergies    penicillins (Rash)    HOME MEDICATIONS: dulera - albuterol MDI as needed - dexilant - pepcid - zetia - insulin - cozaar    REVIEW OF SYSTEMS:  Constitutional: As per HPI  HEENT: Within normal limits  CV: As per HPI  Resp: As per HPI  GI: nausea/vomiting  : Within normal limits  Musculoskeletal: Within normal limits  Skin: Within normal limits  Neurological: Within normal limits  Psychiatric: Within normal limits  Endocrine: Within normal limits  Hematologic/Lymphatic: Within normal limits  Allergic/Immunologic: Within normal limits    [x] All other systems negative    OBJECTIVE:    I&O's Detail    01 Nov 2021 07:01  -  01 Nov 2021 13:28  --------------------------------------------------------  IN:  Total IN: 0 mL    OUT:    Voided (mL): 350 mL  Total OUT: 350 mL    Total NET: -350 mL    Daily Height in cm: 177.8 (01 Nov 2021 07:23)      POCT Blood Glucose.: 252 mg/dL (01 Nov 2021 11:02)  POCT Blood Glucose.: 238 mg/dL (01 Nov 2021 06:13)      PHYSICAL EXAM:  ICU Vital Signs Last 24 Hrs  T(C): 36.8 (01 Nov 2021 13:15), Max: 36.8 (01 Nov 2021 13:15)  T(F): 98.2 (01 Nov 2021 13:15), Max: 98.2 (01 Nov 2021 13:15)  HR: 100 (01 Nov 2021 13:15) (83 - 106)  BP: 148/63 (01 Nov 2021 13:15) (132/59 - 175/87)  BP(mean): 91 (01 Nov 2021 13:15) (91 - 123)  ABP: --  ABP(mean): --  RR: 16 (01 Nov 2021 13:15) (12 - 18)  SpO2: 100% (01 Nov 2021 13:15) (96% - 100%) on room air    General: Sleepy. Sedated. Cooperative. No distress. Appears stated age. Obese  HEENT: Atraumatic. Normocephalic. Anicteric. Normal oral mucosa. PERRL. EOMI. Mallampati class IV airway.  Neck: Supple. Trachea midline. Thyroid without enlargement/tenderness/nodules. No carotid bruit. No JVD. Short and wide.	  Cardiovascular: Regular rate and rhythm. S1 S2 normal. No murmurs, rubs or gallops.  Respiratory: Respirations unlabored. Clear to auscultation and percussion bilaterally. No curvature.  Abdomen: Soft. Diffuse tenderness to palpation. Non-distended. No organomegaly. No masses. Normal bowel sounds.  Extremities: Warm to touch. No clubbing or cyanosis. Trace pretibial edema.  Pulses: 2+ peripheral pulses all extremities.	  Skin: Normal skin color. No rashes or lesions. No ecchymoses. No cyanosis. Warm to touch.  Lymph Nodes: Cervical, supraclavicular and axillary nodes normal  Neurological: Moving all extremities. A and O x 3  Psychiatry: Appropriate mood and affect.      LABS:    Complete Blood Count (10.18.21 @ 19:48)   Nucleated RBC: 0 /100 WBCs   WBC Count: 7.52 K/uL   RBC Count: 5.11 M/uL   Hemoglobin: 13.7 g/dL   Hematocrit: 43.3 %   Mean Cell Volume: 84.7 fl   Mean Cell Hemoglobin: 26.8 pg   Mean Cell Hemoglobin Conc: 31.6 gm/dL   Red Cell Distrib Width: 14.6 %   Platelet Count - Automated: 344 K/uL \\  ---------------------------------------------------------------------------------------------------------------  Comprehensive Metabolic Panel (10.18.21 @ 19:47)   Sodium, Serum: 138 mmol/L   Potassium, Serum: 4.2 mmol/L   Chloride, Serum: 106 mmol/L   Carbon Dioxide, Serum: 21 mmol/L   Anion Gap, Serum: 11 mmol/L   Blood Urea Nitrogen, Serum: 19 mg/dL   Creatinine, Serum: 1.28 mg/dL   Glucose, Serum: 74 mg/dL   Calcium, Total Serum: 9.3 mg/dL   Protein Total, Serum: 6.8 g/dL   Albumin, Serum: 4.2 g/dL   Bilirubin Total, Serum: 0.2 mg/dL   Alkaline Phosphatase, Serum: 155 U/L   Aspartate Aminotransferase (AST/SGOT): 33 U/L   Alanine Aminotransferase (ALT/SGPT): 63 U/L   eGFR if Non : 61  ---------------------------------------------------------------------------------------------------------------    MICROBIOLOGY:       RADIOLOGY:  [x ] Chest radiographs reviewed and interpreted by me    < from: Xray Chest 2 Views PA/Lat (09.03.21 @ 18:53) >    EXAM:  XR CHEST PA LAT 2V      PROCEDURE DATE:  09/03/2021       INTERPRETATION:  TIME OF EXAM: September 3, 2021 at 6:41 PM.    CLINICAL INFORMATION: History of asthma. Persistent cough.    TECHNIQUE:   PA and lateral chest x-ray.    COMPARISON: CT scan of the heart from October 29, 2020. AP chest x-ray from September 9, 2020. PA and lateral chest x-ray from January 19, 2019.    INTERPRETATION: The heart is not enlarged.  The mediastinum and florence are unremarkable.  The lungs are clear.  No pleural effusion or pneumothorax is seen.  No acute bony abnormality is noted.    IMPRESSION:  Clear lungs.    --- End of Report ---      TRENTON WEI MD; Attending Radiologist   This document has been electronically signed. Sep  4 2021  3:04PM    < end of copied text >  ---------------------------------------------------------------------------------------------------------------           [facility] - [facility] - CONSULT NOTE    HPI: 61 year old gentleman, lifelong non-smoker, followed by Dr. Sergio Gray of our practice for cough variant asthma and obstructive sleep apnea. The patient also has a history of HTN, HLD, DM, GERD and a gallbladder polyp. The patient had a severe asthma exacerbation ~ 6 weeks ago treated with a prolonged course of steroids that was completed on 10/21. The patient is now in the recovery room following an uncomplicated laparoscopic cholecystectomy. He has no shortness of breath on a nasal canula. He has no cough, sputum production, hemoptysis, chest congestion or wheeze. No fevers, chills or sweats. No chest pain/pressure or palpitations. Since arrival in the PACU, the patient has been nauseated and has vomited on two occasions. He has severe epigastric pain. He is cold and under multiple warm blankets. Asked to evaluate    PMHX:  Asthma  AR (allergic rhinitis)  Obesity  CHERELLE on CPAP  HTN (hypertension)  DM (diabetes mellitus)  HLD (hyperlipidemia)  GERD (gastroesophageal reflux disease)  Gallbladder polyp  Testicular torsion    PSHX:  Appendectomy  Lumpectomy  Orchiectomy (testicular infarction)  Ptosis repair  Anal fistula repair    FAMILY HISTORY:  mother - CAD - COPD - emphysema  father - Alzheimer's dementia    SOCIAL HISTORY:  lifelong non-smoker    Pulmonary Medications:       Antimicrobials:  ciprofloxacin   IVPB 400 milliGRAM(s) IV Intermittent once  clindamycin IVPB 900 milliGRAM(s) IV Intermittent once      Cardiology:      Other:  dextrose 40% Gel 15 Gram(s) Oral once  dextrose 5%. 1000 milliLiter(s) IV Continuous <Continuous>  dextrose 5%. 1000 milliLiter(s) IV Continuous <Continuous>  dextrose 50% Injectable 25 Gram(s) IV Push once  dextrose 50% Injectable 12.5 Gram(s) IV Push once  dextrose 50% Injectable 25 Gram(s) IV Push once  glucagon  Injectable 1 milliGRAM(s) IntraMuscular once  hydrocortisone sodium succinate Injectable 100 milliGRAM(s) IV Push once  insulin lispro (ADMELOG) corrective regimen sliding scale   SubCutaneous Before meals and at bedtime      Prn:  MEDICATIONS  (PRN):  fentaNYL    Injectable 25 MICROGram(s) IV Push every 5 minutes PRN Moderate Pain (4 - 6)  fentaNYL    Injectable 50 MICROGram(s) IV Push every 10 minutes PRN Severe Pain (7 - 10)  ondansetron Injectable 4 milliGRAM(s) IV Push every 4 hours PRN Nausea and/or Vomiting      Allergies    penicillins (Rash)    HOME MEDICATIONS: dulera - albuterol MDI as needed - dexilant - pepcid - zetia - insulin - cozaar    REVIEW OF SYSTEMS:  Constitutional: As per HPI  HEENT: Within normal limits  CV: As per HPI  Resp: As per HPI  GI: nausea/vomiting  : Within normal limits  Musculoskeletal: Within normal limits  Skin: Within normal limits  Neurological: Within normal limits  Psychiatric: Within normal limits  Endocrine: Within normal limits  Hematologic/Lymphatic: Within normal limits  Allergic/Immunologic: Within normal limits    [x] All other systems negative    OBJECTIVE:    I&O's Detail    01 Nov 2021 07:01  -  01 Nov 2021 13:28  --------------------------------------------------------  IN:  Total IN: 0 mL    OUT:    Voided (mL): 350 mL  Total OUT: 350 mL    Total NET: -350 mL    Daily Height in cm: 177.8 (01 Nov 2021 07:23)      POCT Blood Glucose.: 252 mg/dL (01 Nov 2021 11:02)  POCT Blood Glucose.: 238 mg/dL (01 Nov 2021 06:13)      PHYSICAL EXAM:  ICU Vital Signs Last 24 Hrs  T(C): 36.8 (01 Nov 2021 13:15), Max: 36.8 (01 Nov 2021 13:15)  T(F): 98.2 (01 Nov 2021 13:15), Max: 98.2 (01 Nov 2021 13:15)  HR: 100 (01 Nov 2021 13:15) (83 - 106)  BP: 148/63 (01 Nov 2021 13:15) (132/59 - 175/87)  BP(mean): 91 (01 Nov 2021 13:15) (91 - 123)  ABP: --  ABP(mean): --  RR: 16 (01 Nov 2021 13:15) (12 - 18)  SpO2: 100% (01 Nov 2021 13:15) (96% - 100%) on 2lpm nasal canula    General: Sleepy. Sedated. Cooperative. No distress. Appears stated age. Obese  HEENT: Atraumatic. Normocephalic. Anicteric. Normal oral mucosa. PERRL. EOMI. Mallampati class IV airway.  Neck: Supple. Trachea midline. Thyroid without enlargement/tenderness/nodules. No carotid bruit. No JVD. Short and wide.	  Cardiovascular: Regular rate and rhythm. S1 S2 normal. No murmurs, rubs or gallops.  Respiratory: Respirations unlabored. Decreased chest wall excursion. Bibasilar rales. No curvature.  Abdomen: Soft. Diffuse tenderness to palpation. Non-distended. No organomegaly. No masses. Normal bowel sounds.  Extremities: Warm to touch. No clubbing or cyanosis. Trace pretibial edema.  Pulses: 2+ peripheral pulses all extremities.	  Skin: Normal skin color. No rashes or lesions. No ecchymoses. No cyanosis. Warm to touch.  Lymph Nodes: Cervical, supraclavicular and axillary nodes normal  Neurological: Moving all extremities. A and O x 3  Psychiatry: Appropriate mood and affect.      LABS:    Complete Blood Count (10.18.21 @ 19:48)   Nucleated RBC: 0 /100 WBCs   WBC Count: 7.52 K/uL   RBC Count: 5.11 M/uL   Hemoglobin: 13.7 g/dL   Hematocrit: 43.3 %   Mean Cell Volume: 84.7 fl   Mean Cell Hemoglobin: 26.8 pg   Mean Cell Hemoglobin Conc: 31.6 gm/dL   Red Cell Distrib Width: 14.6 %   Platelet Count - Automated: 344 K/uL \\  ---------------------------------------------------------------------------------------------------------------  Comprehensive Metabolic Panel (10.18.21 @ 19:47)   Sodium, Serum: 138 mmol/L   Potassium, Serum: 4.2 mmol/L   Chloride, Serum: 106 mmol/L   Carbon Dioxide, Serum: 21 mmol/L   Anion Gap, Serum: 11 mmol/L   Blood Urea Nitrogen, Serum: 19 mg/dL   Creatinine, Serum: 1.28 mg/dL   Glucose, Serum: 74 mg/dL   Calcium, Total Serum: 9.3 mg/dL   Protein Total, Serum: 6.8 g/dL   Albumin, Serum: 4.2 g/dL   Bilirubin Total, Serum: 0.2 mg/dL   Alkaline Phosphatase, Serum: 155 U/L   Aspartate Aminotransferase (AST/SGOT): 33 U/L   Alanine Aminotransferase (ALT/SGPT): 63 U/L   eGFR if Non : 61  ---------------------------------------------------------------------------------------------------------------    MICROBIOLOGY:       RADIOLOGY:  [x ] Chest radiographs reviewed and interpreted by me    < from: Xray Chest 2 Views PA/Lat (09.03.21 @ 18:53) >    EXAM:  XR CHEST PA LAT 2V      PROCEDURE DATE:  09/03/2021       INTERPRETATION:  TIME OF EXAM: September 3, 2021 at 6:41 PM.    CLINICAL INFORMATION: History of asthma. Persistent cough.    TECHNIQUE:   PA and lateral chest x-ray.    COMPARISON: CT scan of the heart from October 29, 2020. AP chest x-ray from September 9, 2020. PA and lateral chest x-ray from January 19, 2019.    INTERPRETATION: The heart is not enlarged.  The mediastinum and florence are unremarkable.  The lungs are clear.  No pleural effusion or pneumothorax is seen.  No acute bony abnormality is noted.    IMPRESSION:  Clear lungs.    --- End of Report ---      TRENTON WEI MD; Attending Radiologist   This document has been electronically signed. Sep  4 2021  3:04PM    < end of copied text >  ---------------------------------------------------------------------------------------------------------------

## 2021-11-01 NOTE — ASU DISCHARGE PLAN (ADULT/PEDIATRIC) - ASU DC SPECIAL INSTRUCTIONSFT
WOUND CARE:  Please keep incisions clean and dry. Please do not Scrub or rub incisions. Do not use lotion or powder on incisions.   BATHING: You may shower and/or sponge bathe. You may use warm soapy water in the shower and rinse, pat dry.  ACTIVITY: No heavy lifting or straining. Otherwise, you may return to your usual level of physical activity. If you are taking narcotic pain medication DO NOT drive a car, operate machinery or make important decisions.  DIET: Return to your usual diet.  NOTIFY YOUR SURGEON IF YOU HAVE: any bleeding that does not stop, any pus draining from your wound(s), any fever (over 100.4 F) persistent nausea/vomiting, or if your pain is not controlled on your discharge pain medications, unable to urinate.  Please follow up with your primary care physician in one week regarding your hospitalization, bring copies of your discharge paperwork.  Please follow up with your surgeon, Dr. Abad     Please take one miralax daily starting the day after surgery. You can restart your metamucil the day after the surgery. WOUND CARE:  Remove outer plastics dressing tomorrow - underneath are little white bandaids called steristrips let soap/water run over and pat dry after showering, steri strips will fall off on own in 1-2 weeks  BATHING: You may shower and/or sponge bathe. You may use warm soapy water in the shower and rinse, pat dry.  ACTIVITY: No heavy lifting or straining. Otherwise, you may return to your usual level of physical activity. If you are taking narcotic pain medication DO NOT drive a car, operate machinery or make important decisions.  DIET: Return to your usual diet.  NOTIFY YOUR SURGEON IF YOU HAVE: any bleeding that does not stop, any pus draining from your wound(s), any fever (over 100.4 F) persistent nausea/vomiting, or if your pain is not controlled on your discharge pain medications, unable to urinate.  Please follow up with your primary care physician in one week regarding your hospitalization, bring copies of your discharge paperwork.  Please follow up with your surgeon, Dr. Abad     Please take one miralax daily starting the day after surgery. You can restart your metamucil the day after the surgery.    Please follow up with your regular medical doctor Dr. Park in 1 week, please call to schedule an appointment to discuss recent hospitalization

## 2021-11-01 NOTE — BRIEF OPERATIVE NOTE - OPERATION/FINDINGS
Laparoscopic cholecystectomy, uncomplicated. Abdomen entered with Anastacia technique. Dense fibrous and fatty adhesions around the gallbladder. Gallbladder lifted cephalad. critical view of safety obtained. cystic duct and artery identified, clipped and divided. gallbladder dissected off the liver bed and removed via endocatch bag. hemostasis achieved. abdomen irrigated thoroughly. fascia closed with 0-vicryl and skin closed with 4-0 monocryl.

## 2021-11-02 ENCOUNTER — TRANSCRIPTION ENCOUNTER (OUTPATIENT)
Age: 61
End: 2021-11-02

## 2021-11-02 ENCOUNTER — NON-APPOINTMENT (OUTPATIENT)
Age: 61
End: 2021-11-02

## 2021-11-02 VITALS
DIASTOLIC BLOOD PRESSURE: 83 MMHG | RESPIRATION RATE: 17 BRPM | OXYGEN SATURATION: 100 % | SYSTOLIC BLOOD PRESSURE: 169 MMHG | HEART RATE: 89 BPM | TEMPERATURE: 98 F

## 2021-11-02 LAB
ALBUMIN SERPL ELPH-MCNC: 3.2 G/DL — LOW (ref 3.3–5)
ALP SERPL-CCNC: 96 U/L — SIGNIFICANT CHANGE UP (ref 40–120)
ALT FLD-CCNC: 42 U/L — SIGNIFICANT CHANGE UP (ref 10–45)
ANION GAP SERPL CALC-SCNC: 10 MMOL/L — SIGNIFICANT CHANGE UP (ref 5–17)
AST SERPL-CCNC: 37 U/L — SIGNIFICANT CHANGE UP (ref 10–40)
BILIRUB SERPL-MCNC: 0.3 MG/DL — SIGNIFICANT CHANGE UP (ref 0.2–1.2)
BUN SERPL-MCNC: 18 MG/DL — SIGNIFICANT CHANGE UP (ref 7–23)
CALCIUM SERPL-MCNC: 8.1 MG/DL — LOW (ref 8.4–10.5)
CHLORIDE SERPL-SCNC: 110 MMOL/L — HIGH (ref 96–108)
CO2 SERPL-SCNC: 21 MMOL/L — LOW (ref 22–31)
CREAT SERPL-MCNC: 1.33 MG/DL — HIGH (ref 0.5–1.3)
GLUCOSE BLDC GLUCOMTR-MCNC: 180 MG/DL — HIGH (ref 70–99)
GLUCOSE BLDC GLUCOMTR-MCNC: 242 MG/DL — HIGH (ref 70–99)
GLUCOSE BLDC GLUCOMTR-MCNC: 259 MG/DL — HIGH (ref 70–99)
GLUCOSE SERPL-MCNC: 205 MG/DL — HIGH (ref 70–99)
HCT VFR BLD CALC: 37.7 % — LOW (ref 39–50)
HGB BLD-MCNC: 12 G/DL — LOW (ref 13–17)
MAGNESIUM SERPL-MCNC: 2.5 MG/DL — SIGNIFICANT CHANGE UP (ref 1.6–2.6)
MCHC RBC-ENTMCNC: 26.4 PG — LOW (ref 27–34)
MCHC RBC-ENTMCNC: 31.8 GM/DL — LOW (ref 32–36)
MCV RBC AUTO: 83 FL — SIGNIFICANT CHANGE UP (ref 80–100)
NRBC # BLD: 0 /100 WBCS — SIGNIFICANT CHANGE UP (ref 0–0)
PHOSPHATE SERPL-MCNC: 2.6 MG/DL — SIGNIFICANT CHANGE UP (ref 2.5–4.5)
PLATELET # BLD AUTO: 262 K/UL — SIGNIFICANT CHANGE UP (ref 150–400)
POTASSIUM SERPL-MCNC: 4 MMOL/L — SIGNIFICANT CHANGE UP (ref 3.5–5.3)
POTASSIUM SERPL-SCNC: 4 MMOL/L — SIGNIFICANT CHANGE UP (ref 3.5–5.3)
PROT SERPL-MCNC: 5.5 G/DL — LOW (ref 6–8.3)
RBC # BLD: 4.54 M/UL — SIGNIFICANT CHANGE UP (ref 4.2–5.8)
RBC # FLD: 14.3 % — SIGNIFICANT CHANGE UP (ref 10.3–14.5)
SODIUM SERPL-SCNC: 141 MMOL/L — SIGNIFICANT CHANGE UP (ref 135–145)
WBC # BLD: 12.7 K/UL — HIGH (ref 3.8–10.5)
WBC # FLD AUTO: 12.7 K/UL — HIGH (ref 3.8–10.5)

## 2021-11-02 PROCEDURE — 83735 ASSAY OF MAGNESIUM: CPT

## 2021-11-02 PROCEDURE — 94640 AIRWAY INHALATION TREATMENT: CPT

## 2021-11-02 PROCEDURE — 93005 ELECTROCARDIOGRAM TRACING: CPT

## 2021-11-02 PROCEDURE — 88304 TISSUE EXAM BY PATHOLOGIST: CPT

## 2021-11-02 PROCEDURE — 47562 LAPAROSCOPIC CHOLECYSTECTOMY: CPT

## 2021-11-02 PROCEDURE — 84484 ASSAY OF TROPONIN QUANT: CPT

## 2021-11-02 PROCEDURE — 36415 COLL VENOUS BLD VENIPUNCTURE: CPT

## 2021-11-02 PROCEDURE — C1889: CPT

## 2021-11-02 PROCEDURE — C9399: CPT

## 2021-11-02 PROCEDURE — 83605 ASSAY OF LACTIC ACID: CPT

## 2021-11-02 PROCEDURE — 82962 GLUCOSE BLOOD TEST: CPT

## 2021-11-02 PROCEDURE — 84100 ASSAY OF PHOSPHORUS: CPT

## 2021-11-02 PROCEDURE — 71045 X-RAY EXAM CHEST 1 VIEW: CPT

## 2021-11-02 PROCEDURE — 80053 COMPREHEN METABOLIC PANEL: CPT

## 2021-11-02 PROCEDURE — 85027 COMPLETE CBC AUTOMATED: CPT

## 2021-11-02 RX ORDER — ACETAMINOPHEN 500 MG
2 TABLET ORAL
Qty: 0 | Refills: 0 | DISCHARGE
Start: 2021-11-02

## 2021-11-02 RX ORDER — INFLUENZA VIRUS VACCINE 15; 15; 15; 15 UG/.5ML; UG/.5ML; UG/.5ML; UG/.5ML
0.5 SUSPENSION INTRAMUSCULAR ONCE
Refills: 0 | Status: DISCONTINUED | OUTPATIENT
Start: 2021-11-02 | End: 2021-11-15

## 2021-11-02 RX ORDER — LOSARTAN POTASSIUM 100 MG/1
100 TABLET, FILM COATED ORAL DAILY
Refills: 0 | Status: COMPLETED | OUTPATIENT
Start: 2021-11-02 | End: 2021-11-02

## 2021-11-02 RX ORDER — ACETAMINOPHEN 500 MG
1000 TABLET ORAL ONCE
Refills: 0 | Status: COMPLETED | OUTPATIENT
Start: 2021-11-02 | End: 2021-11-02

## 2021-11-02 RX ADMIN — Medication 1000 MILLIGRAM(S): at 09:50

## 2021-11-02 RX ADMIN — Medication 1000 MILLIGRAM(S): at 09:18

## 2021-11-02 RX ADMIN — LOSARTAN POTASSIUM 100 MILLIGRAM(S): 100 TABLET, FILM COATED ORAL at 08:03

## 2021-11-02 NOTE — DISCHARGE NOTE NURSING/CASE MANAGEMENT/SOCIAL WORK - PATIENT PORTAL LINK FT
You can access the FollowMyHealth Patient Portal offered by [facility] by registering at the following website: [email or web address]. By joining [facility]’s FollowMyHealth portal, you will also be able to view your health information using other applications (apps) compatible with our system.

## 2021-11-02 NOTE — PROGRESS NOTE ADULT - ASSESSMENT
Assessment:  61yMale patient POD#1 S/P laparoscopic cholecystectomy for gallbladder polyp. Recovering appropriately.     Plan:  - Pain control PRN  - Diet: low fat   - Activity: as tolerated   - DVT ppx: LVX   - Endo consult, appreciate recs, started on insulin pump   - Pulm Consult, appreciate recs   - ENT Consult, appreciate recs  - Dispo planning     [facility]  [id number].
ASSESSMENT:    s/p cholecystectomy for a gallbladder polyp - post-operative nausea/vomiting due to after effects of anesthesia has resolved -> the patient tolerated a regular diet - severe post-operative pain is now well controlled    h/o cough variant asthma s/p a recent exacerbation requiring a prolonged course of systemic steroids    obesity related obstructive sleep apnea    HTN/DM/HLD     GERD (gastroesophageal reflux disease)    PLAN/RECOMMENDATIONS:    stable oxygenation on room air - no evidence for hypoxemia or hypercapnia  await pathology of the gallbladder polyp  received hydrocortisone 100mg IVP on call to OR and 6 hours after OR - mild hyperglycemia  nocturnal CPAP - patient is quite compliant with his home machine and slept well with it in the PACU  multi-modal pain control - avoiding narcotic analgesics as able  DVT prophylaxis - SQ lovenox - ambulate at home  continue frequent use of an incentive spirometer   dulera/albuterol MDI as needed  dexilant/pepcid  glucose control  cozaar/ASA    Will follow with you. Plan of care discussed with the patient and his RN at bedside. No pulmonary objection to discharge on his usual pulmonary regimen and with nocturnal CPAP. The patient will follow up with Dr. Sergio Gray of our practice as scheduled.     Sergio Bravo MD, [facility]  [phone]  Pulmonary Medicine

## 2021-11-02 NOTE — PROGRESS NOTE ADULT - SUBJECTIVE AND OBJECTIVE BOX
[facility] - PROGRESS NOTE    CHIEF COMPLAINT: asthma; obstructive sleep apnea; obesity; hypoxemia; atelectasis    INTERVAL HISTORY: POD #1 s/p laparoscopic cholecystectomy - looks remarkably better; severe abdominal pain is now well controlled; severe nausea and vomiting has resolved and the patient tolerated a full breakfast; he is walking around the [place]z on room air without shortness of breath or hypoxemia; no cough, sputum production, hemoptysis, chest congestion or wheeze; no fevers, chills or sweats; no chest pain/pressure or palpitations; ENT evaluation did not reveal a posterior pharyngeal mass as suspected by anesthesia; slept well with his own CPAP machine    REVIEW OF SYSTEMS:  Constitutional: As per interval history  HEENT: Within normal limits  CV: As per interval history  Resp: As per interval history  GI: gallbladder polyp  : Within normal limits  Musculoskeletal: Within normal limits  Skin: Within normal limits  Neurological: Within normal limits  Psychiatric: Within normal limits  Endocrine: Within normal limits  Hematologic/Lymphatic: Within normal limits  Allergic/Immunologic: Within normal limits    MEDICATIONS:     Pulmonary "  mometasone 200 MICROgram(s)/formoterol 5 MICROgram(s) Inhaler 2 Puff(s) Inhalation two times a day    Anti-microbials:    Cardiovascular:    Other:  dextrose 40% Gel 15 Gram(s) Oral once  dextrose 5%. 1000 milliLiter(s) IV Continuous <Continuous>  dextrose 5%. 1000 milliLiter(s) IV Continuous <Continuous>  dextrose 50% Injectable 25 Gram(s) IV Push once  dextrose 50% Injectable 12.5 Gram(s) IV Push once  dextrose 50% Injectable 25 Gram(s) IV Push once  enoxaparin Injectable 40 milliGRAM(s) SubCutaneous every 24 hours  glucagon  Injectable 1 milliGRAM(s) IntraMuscular once  influenza   Vaccine 0.5 milliLiter(s) IntraMuscular once  insulin lispro (ADMELOG) Pump 1 Each SubCutaneous Continuous Pump    MEDICATIONS  (PRN):  fentaNYL    Injectable 25 MICROGram(s) IV Push every 5 minutes PRN Moderate Pain (4 - 6)  fentaNYL    Injectable 50 MICROGram(s) IV Push every 10 minutes PRN Severe Pain (7 - 10)  ondansetron Injectable 4 milliGRAM(s) IV Push every 4 hours PRN Nausea and/or Vomiting  oxyCODONE    IR 5 milliGRAM(s) Oral every 4 hours PRN Moderate Pain (4 - 6)  oxyCODONE    IR 10 milliGRAM(s) Oral every 4 hours PRN Severe Pain (7 - 10)    OBJECTIVE:    I&O's Detail    01 Nov 2021 07:01  -  02 Nov 2021 07:00  --------------------------------------------------------  IN:    IV PiggyBack: 150 mL    IV PiggyBack: 500 mL    Lactated Ringers: 1300 mL    Lactated Ringers: 75 mL    Oral Fluid: 60 mL  Total IN: 2085 mL    OUT:    Voided (mL): 1225 mL  Total OUT: 1225 mL    Total NET: 860 mL    POCT Blood Glucose.: 180 mg/dL (02 Nov 2021 06:08)  POCT Blood Glucose.: 259 mg/dL (02 Nov 2021 02:39)  POCT Blood Glucose.: 242 mg/dL (02 Nov 2021 00:13)  POCT Blood Glucose.: 269 mg/dL (01 Nov 2021 22:21)  POCT Blood Glucose.: 254 mg/dL (01 Nov 2021 16:18)  POCT Blood Glucose.: 252 mg/dL (01 Nov 2021 11:02)      PHYSICAL EXAM:       ICU Vital Signs Last 24 Hrs  T(C): 36.4 (02 Nov 2021 08:00), Max: 37.1 (02 Nov 2021 05:00)  T(F): 97.5 (02 Nov 2021 08:00), Max: 98.8 (02 Nov 2021 05:00)  HR: 89 (02 Nov 2021 08:00) (86 - 116)  BP: 169/83 (02 Nov 2021 08:00) (118/56 - 175/87)  BP(mean): 117 (02 Nov 2021 08:00) (79 - 123)  ABP: --  ABP(mean): --  RR: 17 (02 Nov 2021 08:00) (12 - 18)  SpO2: 100% (02 Nov 2021 08:00) (97% - 100%) on room air     General: Awake. Alert. Cooperative. No distress. Appears stated age.	  HEENT: Atraumatic. Normocephalic. Anicteric. Normal oral mucosa. PERRL. EOMI. Mallampati class IV airway.  Neck: Supple. Trachea midline. Thyroid without enlargement/tenderness/nodules. No carotid bruit. No JVD. Short and wide.	  Cardiovascular: Regular rate and rhythm. S1 S2 normal. No murmurs, rubs or gallops.  Respiratory: Respirations unlabored. Clear to auscultation and percussion. No curvature.  Abdomen: Soft. Mildly tender to palpation. Non-distended. No organomegaly. No masses. Normal bowel sounds.  Extremities: Warm to touch. No clubbing or cyanosis. Trace pretibial edema.  Pulses: 2+ peripheral pulses all extremities.	  Skin: Normal skin color. No rashes or lesions. No ecchymoses. No cyanosis. Warm to touch.  Lymph Nodes: Cervical, supraclavicular and axillary nodes normal  Neurological: Moving all extremities. A and O x 3  Psychiatry: Appropriate mood and affect.    LABS:                          12.0   12.70 )-----------( 262      ( 02 Nov 2021 06:00 )             37.7     CBC    WBC  12.70 <==, 14.35 <==    Hemoglobin  12.0 <<==, 13.7 <<==    Hematocrit  37.7 <==, 43.1 <==    Platelets  262 <==, 267 <==      141  |  110<H>  |  18  ----------------------------<  205<H>    11-02  4.0   |  21<L>  |  1.33<H>      LYTES    sodium  141 <==, 139 <==    potassium   4.0 <==, 4.6 <==    chloride  110 <==, 106 <==    carbon dioxide  21 <==, 19 <==    =============================================================================================  RENAL FUNCTION:    Creatinine:   1.33  <<==, 1.35  <<==    BUN:   18 <==, 20 <==    ============================================================================================    calcium   8.1 <==, 8.3 <==    phos   2.6 <==, 1.9 <==    mag   2.5 <==, 1.9 <==    ============================================================================================  LFTs    AST:   37 <== , 41 <==     ALT:  42  <== , 42  <==     AP:  96  <=, 134  <=    Bili:  0.3  <=, 0.2  <=    CARDIAC MARKERS ( 01 Nov 2021 16:49 )  CPK x     /CKMB x     /CKMB Units x        troponin 19 ng/L      MICROBIOLOGY:       RADIOLOGY:  [x] Chest radiographs reviewed and interpreted by me    < from: Xray Chest 1 View- PORTABLE-Urgent (Xray Chest 1 View- PORTABLE-Urgent .) (11.01.21 @ 16:59) >    EXAM:  XR CHEST PORTABLE URGENT 1V                          PROCEDURE DATE:  11/01/2021      INTERPRETATION:  Chest one view    HISTORY: Postop    COMPARISON STUDY: 9/9/2020    Frontal expiratory view of the chest shows the heart to be normal in size. The lungs are clear and there is no evidence of pneumothorax nor pleural effusion.    IMPRESSION:  No active pulmonary disease.    Thank you for the courtesy of this referral.    --- End of Report ---    ANSHUL PARHAM MD; Attending Interventional Radiologist  This document has been electronically signed. Nov 2 2021  9:15AM    < end of copied text >  ---------------------------------------------------------------------------------------------------------------        
    STATUS POST:  bel hickse     POST OPERATIVE DAY #: 1    SUBJECTIVE:   Seen and examined at bedside during AM rounds. No acute events overnight. Nausea, emesis, and tachycardia resolved. Tolerating a diet. Ambulating, voiding.     OBJECTIVE: T(C): 37.1 (11-02-21 @ 05:00), Max: 37.1 (11-02-21 @ 05:00)  HR: 92 (11-02-21 @ 07:00) (86 - 116)  BP: 162/81 (11-02-21 @ 07:00) (118/56 - 175/87)  RR: 17 (11-02-21 @ 07:00) (12 - 18)  SpO2: 100% (11-02-21 @ 07:00) (97% - 100%)  Wt(kg): --  I&O's Summary    01 Nov 2021 07:01  -  02 Nov 2021 07:00  --------------------------------------------------------  IN: 2085 mL / OUT: 1225 mL / NET: 860 mL      I&O's Detail    01 Nov 2021 07:01  -  02 Nov 2021 07:00  --------------------------------------------------------  IN:    IV PiggyBack: 150 mL    IV PiggyBack: 500 mL    Lactated Ringers: 1300 mL    Lactated Ringers: 75 mL    Oral Fluid: 60 mL  Total IN: 2085 mL    OUT:    Voided (mL): 1225 mL  Total OUT: 1225 mL    Total NET: 860 mL      Physical Exam  General: NAD  Resp: nonlabored, airway patent   Abdomen: soft, nontender, nondistended. incisions c/d/i  Vasc: WWP    MEDICATIONS  (STANDING):  ciprofloxacin   IVPB 400 milliGRAM(s) IV Intermittent once  clindamycin IVPB 900 milliGRAM(s) IV Intermittent once  dextrose 40% Gel 15 Gram(s) Oral once  dextrose 5%. 1000 milliLiter(s) (50 mL/Hr) IV Continuous <Continuous>  dextrose 5%. 1000 milliLiter(s) (100 mL/Hr) IV Continuous <Continuous>  dextrose 50% Injectable 25 Gram(s) IV Push once  dextrose 50% Injectable 12.5 Gram(s) IV Push once  dextrose 50% Injectable 25 Gram(s) IV Push once  enoxaparin Injectable 40 milliGRAM(s) SubCutaneous every 24 hours  glucagon  Injectable 1 milliGRAM(s) IntraMuscular once  insulin lispro (ADMELOG) Pump 1 Each SubCutaneous Continuous Pump  lactated ringers. 1000 milliLiter(s) (100 mL/Hr) IV Continuous <Continuous>  mometasone 200 MICROgram(s)/formoterol 5 MICROgram(s) Inhaler 2 Puff(s) Inhalation two times a day    MEDICATIONS  (PRN):  fentaNYL    Injectable 25 MICROGram(s) IV Push every 5 minutes PRN Moderate Pain (4 - 6)  fentaNYL    Injectable 50 MICROGram(s) IV Push every 10 minutes PRN Severe Pain (7 - 10)  ondansetron Injectable 4 milliGRAM(s) IV Push every 4 hours PRN Nausea and/or Vomiting  oxyCODONE    IR 5 milliGRAM(s) Oral every 4 hours PRN Moderate Pain (4 - 6)  oxyCODONE    IR 10 milliGRAM(s) Oral every 4 hours PRN Severe Pain (7 - 10)      LABS:                        12.0   12.70 )-----------( 262      ( 02 Nov 2021 06:00 )             37.7     11-02    141  |  110<H>  |  18  ----------------------------<  205<H>  4.0   |  21<L>  |  1.33<H>    Ca    8.1<L>      02 Nov 2021 06:00  Phos  2.6     11-02  Mg     2.5     11-02    TPro  5.5<L>  /  Alb  3.2<L>  /  TBili  0.3  /  DBili  x   /  AST  37  /  ALT  42  /  AlkPhos  96  11-02

## 2021-11-02 NOTE — DISCHARGE NOTE NURSING/CASE MANAGEMENT/SOCIAL WORK - NSDPLANG ASIS_GEN_ALL_CORE
Chart review completed for refill of Humira indicates no medication or significant health changes since last pharmacist counseling. No questions for the pharmacist. Medication shipped to [address] [place] [id number] via [employer] for delivery on 5/29.    
No

## 2021-11-17 ENCOUNTER — APPOINTMENT (OUTPATIENT)
Dept: SURGERY | Facility: CLINIC | Age: 61
End: 2021-11-17
Payer: COMMERCIAL

## 2021-11-17 VITALS
RESPIRATION RATE: 17 BRPM | BODY MASS INDEX: 38.08 KG/M2 | SYSTOLIC BLOOD PRESSURE: 167 MMHG | HEIGHT: 70 IN | WEIGHT: 266 LBS | OXYGEN SATURATION: 96 % | HEART RATE: 96 BPM | TEMPERATURE: 97.1 F | DIASTOLIC BLOOD PRESSURE: 96 MMHG

## 2021-11-17 PROCEDURE — 99024 POSTOP FOLLOW-UP VISIT: CPT

## 2021-11-17 NOTE — REASON FOR VISIT
[Post Op: _________] : a [unfilled] post op visit [FreeTextEntry1] : Laparoscopic cholecystectomy on 11/01/2021

## 2021-11-17 NOTE — HISTORY OF PRESENT ILLNESS
[de-identified] : Karthikeyan is a 61 year old male here  for a post operative visit. \par Today pt reports feeling well, no pain. Pt reports taking Tylenol post surgery. Reports postop  nausea and fatigue.  The nausea has resolved for the last 2 days.  He continues with low energy levels. Small appetite, no vomiting. 2 formed BMs daily, no pain, no bleeding. \par He was treated with stress dose steroids postop due to his recent asthma attack that had required steroids at the time.

## 2021-11-17 NOTE — ASSESSMENT
[FreeTextEntry1] : Doing well postop overall.  His postop nausea is likely related to anesthesia effect, however I will check CBC and a CMP.\par His wounds are slow to heal.  I instructed him to use Vaseline and cover them for 5 days and then leave them open for a scab to form.\par Path not back yet. \par All questions answered.\par RTO in 2 weeks.\par \par

## 2021-11-17 NOTE — CONSULT LETTER
[Dear  ___] : Dear ~MINGO, [Courtesy Letter:] : I had the pleasure of seeing your patient, [unfilled], in my office today. [Please see my note below.] : Please see my note below. [Consult Closing:] : Thank you very much for allowing me to participate in the care of this patient.  If you have any questions, please do not hesitate to contact me. [Sincerely,] : Sincerely, [FreeTextEntry2] : NATHAN Oconnor [FreeTextEntry3] : Rose Abad M.D., F.A.C.S., F.DUSTIN.S.C.R.S.\par Assistant Professor of Surgery\par [employer] [facility]\par \par  [DrGarret  ___] : Dr. OCHOA

## 2021-11-18 ENCOUNTER — NON-APPOINTMENT (OUTPATIENT)
Age: 61
End: 2021-11-18

## 2021-11-18 LAB
BASOPHILS # BLD AUTO: 0.03 K/UL
BASOPHILS NFR BLD AUTO: 0.4 %
CK SERPL-CCNC: 643 U/L
EOSINOPHIL # BLD AUTO: 0.2 K/UL
EOSINOPHIL NFR BLD AUTO: 2.6 %
HCT VFR BLD CALC: 44.1 %
HGB BLD-MCNC: 13.9 G/DL
IMM GRANULOCYTES NFR BLD AUTO: 0.3 %
LYMPHOCYTES # BLD AUTO: 2.38 K/UL
LYMPHOCYTES NFR BLD AUTO: 30.9 %
MAN DIFF?: NORMAL
MCHC RBC-ENTMCNC: 27.3 PG
MCHC RBC-ENTMCNC: 31.5 GM/DL
MCV RBC AUTO: 86.5 FL
MONOCYTES # BLD AUTO: 0.8 K/UL
MONOCYTES NFR BLD AUTO: 10.4 %
NEUTROPHILS # BLD AUTO: 4.26 K/UL
NEUTROPHILS NFR BLD AUTO: 55.4 %
PLATELET # BLD AUTO: 346 K/UL
RBC # BLD: 5.1 M/UL
RBC # FLD: 14.5 %
WBC # FLD AUTO: 7.69 K/UL

## 2021-11-20 ENCOUNTER — LABORATORY RESULT (OUTPATIENT)
Age: 61
End: 2021-11-20

## 2021-11-22 LAB
BASOPHILS # BLD AUTO: 0.02 K/UL
BASOPHILS NFR BLD AUTO: 0.3 %
EOSINOPHIL # BLD AUTO: 0.37 K/UL
EOSINOPHIL NFR BLD AUTO: 6.4 %
HCT VFR BLD CALC: 42.5 %
HGB BLD-MCNC: 13.1 G/DL
IMM GRANULOCYTES NFR BLD AUTO: 0.2 %
LYMPHOCYTES # BLD AUTO: 1.65 K/UL
LYMPHOCYTES NFR BLD AUTO: 28.6 %
MAN DIFF?: NORMAL
MCHC RBC-ENTMCNC: 26.2 PG
MCHC RBC-ENTMCNC: 30.8 GM/DL
MCV RBC AUTO: 85 FL
MONOCYTES # BLD AUTO: 0.68 K/UL
MONOCYTES NFR BLD AUTO: 11.8 %
NEUTROPHILS # BLD AUTO: 3.04 K/UL
NEUTROPHILS NFR BLD AUTO: 52.7 %
PLATELET # BLD AUTO: 274 K/UL
RBC # BLD: 5 M/UL
RBC # FLD: 14.4 %
WBC # FLD AUTO: 5.77 K/UL

## 2021-11-29 LAB — SURGICAL PATHOLOGY STUDY: SIGNIFICANT CHANGE UP

## 2021-12-01 ENCOUNTER — APPOINTMENT (OUTPATIENT)
Dept: SURGERY | Facility: CLINIC | Age: 61
End: 2021-12-01
Payer: COMMERCIAL

## 2021-12-01 VITALS
TEMPERATURE: 97.9 F | OXYGEN SATURATION: 97 % | SYSTOLIC BLOOD PRESSURE: 169 MMHG | DIASTOLIC BLOOD PRESSURE: 91 MMHG | RESPIRATION RATE: 17 BRPM | HEIGHT: 70 IN | BODY MASS INDEX: 35.79 KG/M2 | HEART RATE: 73 BPM | WEIGHT: 250 LBS

## 2021-12-01 DIAGNOSIS — Z09 ENCOUNTER FOR FOLLOW-UP EXAMINATION AFTER COMPLETED TREATMENT FOR CONDITIONS OTHER THAN MALIGNANT NEOPLASM: ICD-10-CM

## 2021-12-01 DIAGNOSIS — R53.83 OTHER FATIGUE: ICD-10-CM

## 2021-12-01 DIAGNOSIS — R11.0 NAUSEA: ICD-10-CM

## 2021-12-01 PROCEDURE — 99024 POSTOP FOLLOW-UP VISIT: CPT

## 2021-12-02 PROBLEM — Z09 POSTOPERATIVE EXAMINATION: Status: ACTIVE | Noted: 2021-11-15

## 2021-12-02 NOTE — HISTORY OF PRESENT ILLNESS
[de-identified] : Karthikeyan is a 60 y/o male here for a post op visit. \par Path : gallbladder polyp with low-grade biliary dysplasia, cystic duct margin negative for dysplasia, chronic cholecystitis. \par He was last seen on 11/17/21 with c/o nausea and fatigue. Obtained CBC, normal.\par He also had blood work done by PMD on 11/20, all normal except CPK = 420 - it was elevated preop as well. PMD referred him to Rheumatology but he has not been able to get an apt yet.\par Today he reports no pain and tolerating po, but has persistent nausea and low energy levels. Takes Tylenol for the nausea and fatigue, both symptoms resolve with the use of Tylenol. Regular BMs.\par He had periop stress dose steroids as per Pulmonary - IV preop and postop in the hospital and then po at home. \par

## 2021-12-02 NOTE — CONSULT LETTER
[DrGarret  ___] : Dr. OCHOA [DrGarret ___] : Dr. OCHAO [Dear  ___] : Dear ~MINGO, [Courtesy Letter:] : I had the pleasure of seeing your patient, [unfilled], in my office today. [Please see my note below.] : Please see my note below. [Consult Closing:] : Thank you very much for allowing me to participate in the care of this patient.  If you have any questions, please do not hesitate to contact me. [Sincerely,] : Sincerely, [FreeTextEntry2] : NATHAN Oconnor [FreeTextEntry3] : Rose Abad M.D., F.A.C.S., F.DUSTIN.S.C.R.S.\par Assistant Professor of Surgery\par [employer] at [facility]\par \par

## 2021-12-02 NOTE — ASSESSMENT
[FreeTextEntry1] : Has healed from the surgery.\par Path discussed - polyp with low grade dysplasia, margins clear.\par Ongoing fatigue and nausea of unclear etiology - does not appear to be d/t a surgical issue - I'll check CBC and a CMP and a RUQ US.\par Could the symptoms be d/t adrenal insufficiency??\par Instructed to reach out to his Endocrinologist. \par All questions answered.\par RTO as needed - will call with test results.\par \par

## 2021-12-03 ENCOUNTER — LABORATORY RESULT (OUTPATIENT)
Age: 61
End: 2021-12-03

## 2021-12-11 ENCOUNTER — OUTPATIENT (OUTPATIENT)
Dept: OUTPATIENT SERVICES | Facility: HOSPITAL | Age: 61
LOS: 1 days | End: 2021-12-11
Payer: COMMERCIAL

## 2021-12-11 ENCOUNTER — APPOINTMENT (OUTPATIENT)
Dept: ULTRASOUND IMAGING | Facility: CLINIC | Age: 61
End: 2021-12-11
Payer: COMMERCIAL

## 2021-12-11 DIAGNOSIS — K60.3 ANAL FISTULA: Chronic | ICD-10-CM

## 2021-12-11 DIAGNOSIS — E11.9 TYPE 2 DIABETES MELLITUS WITHOUT COMPLICATIONS: ICD-10-CM

## 2021-12-11 DIAGNOSIS — Z98.890 OTHER SPECIFIED POSTPROCEDURAL STATES: Chronic | ICD-10-CM

## 2021-12-11 DIAGNOSIS — Z90.79 ACQUIRED ABSENCE OF OTHER GENITAL ORGAN(S): Chronic | ICD-10-CM

## 2021-12-11 DIAGNOSIS — Z90.49 ACQUIRED ABSENCE OF OTHER SPECIFIED PARTS OF DIGESTIVE TRACT: Chronic | ICD-10-CM

## 2021-12-11 DIAGNOSIS — Z00.8 ENCOUNTER FOR OTHER GENERAL EXAMINATION: ICD-10-CM

## 2021-12-11 PROCEDURE — 76700 US EXAM ABDOM COMPLETE: CPT | Mod: 26

## 2021-12-11 PROCEDURE — 76700 US EXAM ABDOM COMPLETE: CPT

## 2021-12-14 LAB
ALBUMIN SERPL ELPH-MCNC: 4.3 G/DL
ALP BLD-CCNC: 166 U/L
ALT SERPL-CCNC: 35 U/L
ANION GAP SERPL CALC-SCNC: 15 MMOL/L
AST SERPL-CCNC: 32 U/L
BASOPHILS # BLD AUTO: 0.04 K/UL
BASOPHILS NFR BLD AUTO: 0.6 %
BILIRUB SERPL-MCNC: <0.2 MG/DL
BUN SERPL-MCNC: 13 MG/DL
CALCIUM SERPL-MCNC: 9.3 MG/DL
CHLORIDE SERPL-SCNC: 104 MMOL/L
CO2 SERPL-SCNC: 22 MMOL/L
CREAT SERPL-MCNC: 1.28 MG/DL
EOSINOPHIL # BLD AUTO: 0.41 K/UL
EOSINOPHIL NFR BLD AUTO: 5.9 %
GLUCOSE SERPL-MCNC: 102 MG/DL
HCT VFR BLD CALC: 45.8 %
HGB BLD-MCNC: 14.1 G/DL
IMM GRANULOCYTES NFR BLD AUTO: 0.1 %
LYMPHOCYTES # BLD AUTO: 2.06 K/UL
LYMPHOCYTES NFR BLD AUTO: 29.5 %
MAN DIFF?: NORMAL
MCHC RBC-ENTMCNC: 26.6 PG
MCHC RBC-ENTMCNC: 30.8 GM/DL
MCV RBC AUTO: 86.4 FL
MONOCYTES # BLD AUTO: 0.67 K/UL
MONOCYTES NFR BLD AUTO: 9.6 %
NEUTROPHILS # BLD AUTO: 3.79 K/UL
NEUTROPHILS NFR BLD AUTO: 54.3 %
PLATELET # BLD AUTO: 286 K/UL
POTASSIUM SERPL-SCNC: 4.3 MMOL/L
PROT SERPL-MCNC: 6.6 G/DL
RBC # BLD: 5.3 M/UL
RBC # FLD: 14.3 %
SODIUM SERPL-SCNC: 141 MMOL/L
WBC # FLD AUTO: 6.98 K/UL

## 2021-12-20 ENCOUNTER — NON-APPOINTMENT (OUTPATIENT)
Age: 61
End: 2021-12-20

## 2021-12-21 ENCOUNTER — NON-APPOINTMENT (OUTPATIENT)
Age: 61
End: 2021-12-21

## 2021-12-21 ENCOUNTER — APPOINTMENT (OUTPATIENT)
Dept: CARDIOLOGY | Facility: CLINIC | Age: 61
End: 2021-12-21
Payer: COMMERCIAL

## 2021-12-21 ENCOUNTER — LABORATORY RESULT (OUTPATIENT)
Age: 61
End: 2021-12-21

## 2021-12-21 VITALS
BODY MASS INDEX: 38.22 KG/M2 | WEIGHT: 267 LBS | DIASTOLIC BLOOD PRESSURE: 78 MMHG | OXYGEN SATURATION: 97 % | HEIGHT: 70 IN | HEART RATE: 83 BPM | SYSTOLIC BLOOD PRESSURE: 154 MMHG | TEMPERATURE: 98.5 F

## 2021-12-21 DIAGNOSIS — K82.4 CHOLESTEROLOSIS OF GALLBLADDER: ICD-10-CM

## 2021-12-21 PROCEDURE — 99214 OFFICE O/P EST MOD 30 MIN: CPT

## 2021-12-21 PROCEDURE — 93000 ELECTROCARDIOGRAM COMPLETE: CPT

## 2021-12-21 NOTE — HISTORY OF PRESENT ILLNESS
[FreeTextEntry1] : This is a 61 year old gentlemen who presents today for an acute issue of fatigue, nausea and mild Headache that have been occurring since 11/01/2021 when he had his Cholecystectomy. Patient states that he was seen by Surgeon had repeat lab work that was unremarkable, patient had US Abd that was unremarkable. Patient states that he thinks he thinks that this issue is related to being off oral steroids. Patient explains that when he was placed back on steroids by ENDO he felt much better. Patient denies dyspnea, palpitations, chest pain, nausea, vomiting, dizziness and lightheadedness.\par

## 2021-12-21 NOTE — REVIEW OF SYSTEMS
[Feeling Fatigued] : feeling fatigued [Negative] : Heme/Lymph [Fever] : no fever [Headache] : no headache [Weight Gain (___ Lbs)] : no recent weight gain [Chills] : no chills [Weight Loss (___ Lbs)] : no recent weight loss

## 2021-12-29 ENCOUNTER — NON-APPOINTMENT (OUTPATIENT)
Age: 61
End: 2021-12-29

## 2022-01-03 ENCOUNTER — APPOINTMENT (OUTPATIENT)
Dept: CARDIOLOGY | Facility: CLINIC | Age: 62
End: 2022-01-03
Payer: COMMERCIAL

## 2022-01-03 VITALS
HEART RATE: 96 BPM | BODY MASS INDEX: 38.08 KG/M2 | SYSTOLIC BLOOD PRESSURE: 146 MMHG | WEIGHT: 266 LBS | HEIGHT: 70 IN | DIASTOLIC BLOOD PRESSURE: 80 MMHG | OXYGEN SATURATION: 97 %

## 2022-01-03 VITALS — TEMPERATURE: 98.6 F

## 2022-01-03 PROCEDURE — 99213 OFFICE O/P EST LOW 20 MIN: CPT

## 2022-01-03 NOTE — HISTORY OF PRESENT ILLNESS
[FreeTextEntry1] : pt presents for f/u pt with fatigue  pt with mild -moderate improvement .pt on tapering  steroids now on only 2.5 mg po qd of prednisone .pt x 1 week then d/c pt s/p labs improvement in crp /mono negative pt denies any chest  pain dizziness ,lightheadedness ,nausea vomiting diaphoresis\par pt denies fevers /chills

## 2022-01-17 ENCOUNTER — RX RENEWAL (OUTPATIENT)
Age: 62
End: 2022-01-17

## 2022-01-18 ENCOUNTER — NON-APPOINTMENT (OUTPATIENT)
Age: 62
End: 2022-01-18

## 2022-01-18 LAB
25(OH)D3 SERPL-MCNC: 34.4 NG/ML
ALDOLASE SERPL-CCNC: 8 U/L
ANA PAT FLD IF-IMP: ABNORMAL
ANA SER IF-ACNC: ABNORMAL
ANA SER IF-ACNC: NEGATIVE
ANION GAP SERPL CALC-SCNC: 15 MMOL/L
ANION GAP SERPL CALC-SCNC: 18 MMOL/L
B BURGDOR IGG+IGM SER QL IB: NORMAL
B19V IGG SER QL IA: 6.85 INDEX
B19V IGG+IGM SER-IMP: NORMAL
B19V IGG+IGM SER-IMP: POSITIVE
B19V IGM FLD-ACNC: 0.15 INDEX
B19V IGM SER-ACNC: NEGATIVE
BASOPHILS # BLD AUTO: 0.03 K/UL
BASOPHILS NFR BLD AUTO: 0.4 %
BUN SERPL-MCNC: 17 MG/DL
BUN SERPL-MCNC: 17 MG/DL
CALCIUM SERPL-MCNC: 9.1 MG/DL
CALCIUM SERPL-MCNC: 9.7 MG/DL
CCP AB SER IA-ACNC: <8 UNITS
CHLORIDE SERPL-SCNC: 104 MMOL/L
CHLORIDE SERPL-SCNC: 105 MMOL/L
CK SERPL-CCNC: 407 U/L
CO2 SERPL-SCNC: 20 MMOL/L
CO2 SERPL-SCNC: 23 MMOL/L
CORTICOSTEROID BIND GLOBULIN: 2.1 MG/DL
CORTIS SERPL-MCNC: 7 UG/DL
CORTIS SERPL-MCNC: 7.7 UG/DL
CORTISOL, FREE: 0.37 UG/DL
CREAT SERPL-MCNC: 1.31 MG/DL
CREAT SERPL-MCNC: 1.37 MG/DL
CRP SERPL-MCNC: 10 MG/L
CRP SERPL-MCNC: 7 MG/L
DSDNA AB SER-ACNC: <12 IU/ML
DSDNA AB SER-ACNC: <12 IU/ML
EBV EA AB SER IA-ACNC: <5 U/ML
EBV EA AB SER IA-ACNC: <5 U/ML
EBV EA AB TITR SER IF: POSITIVE
EBV EA AB TITR SER IF: POSITIVE
EBV EA IGG SER QL IA: >600 U/ML
EBV EA IGG SER QL IA: >600 U/ML
EBV EA IGG SER-ACNC: NEGATIVE
EBV EA IGG SER-ACNC: NEGATIVE
EBV EA IGM SER IA-ACNC: POSITIVE
EBV EA IGM SER IA-ACNC: POSITIVE
EBV PATRN SPEC IB-IMP: NORMAL
EBV PATRN SPEC IB-IMP: NORMAL
EBV VCA IGG SER IA-ACNC: >750 U/ML
EBV VCA IGG SER IA-ACNC: >750 U/ML
EBV VCA IGM SER QL IA: 113 U/ML
EBV VCA IGM SER QL IA: 124 U/ML
EOSINOPHIL # BLD AUTO: 0.39 K/UL
EOSINOPHIL NFR BLD AUTO: 5.2 %
EPSTEIN-BARR VIRUS CAPSID ANTIGEN IGG: POSITIVE
EPSTEIN-BARR VIRUS CAPSID ANTIGEN IGG: POSITIVE
ERYTHROCYTE [SEDIMENTATION RATE] IN BLOOD BY WESTERGREN METHOD: 32 MM/HR
ERYTHROCYTE [SEDIMENTATION RATE] IN BLOOD BY WESTERGREN METHOD: 35 MM/HR
ESTIMATED AVERAGE GLUCOSE: 183 MG/DL
FERRITIN SERPL-MCNC: 112 NG/ML
FOLATE SERPL-MCNC: 13.3 NG/ML
FRUCTOSAMINE SERPL-MCNC: 305 UMOL/L
GLUCOSE SERPL-MCNC: 100 MG/DL
GLUCOSE SERPL-MCNC: 240 MG/DL
HAPTOGLOB SERPL-MCNC: 208 MG/DL
HAV IGM SER QL: NONREACTIVE
HBA1C MFR BLD HPLC: 8 %
HBV CORE IGM SER QL: NONREACTIVE
HBV SURFACE AB SERPL IA-ACNC: <3 MIU/ML
HBV SURFACE AG SER QL: NONREACTIVE
HCT VFR BLD CALC: 47.1 %
HCV AB SER QL: NONREACTIVE
HCV S/CO RATIO: 0.13 S/CO
HETEROPH AB SER QL: NEGATIVE
HGB BLD-MCNC: 14.4 G/DL
IMM GRANULOCYTES NFR BLD AUTO: 0.3 %
IRON SERPL-MCNC: 62 UG/DL
LDH SERPL-CCNC: 228 U/L
LYMPHOCYTES # BLD AUTO: 1.98 K/UL
LYMPHOCYTES NFR BLD AUTO: 26.3 %
MAN DIFF?: NORMAL
MCHC RBC-ENTMCNC: 26.5 PG
MCHC RBC-ENTMCNC: 30.6 GM/DL
MCV RBC AUTO: 86.7 FL
MONOCYTES # BLD AUTO: 0.87 K/UL
MONOCYTES NFR BLD AUTO: 11.5 %
NEUTROPHILS # BLD AUTO: 4.25 K/UL
NEUTROPHILS NFR BLD AUTO: 56.3 %
PFCX: 4.8 %
PLATELET # BLD AUTO: 265 K/UL
POTASSIUM SERPL-SCNC: 4.3 MMOL/L
POTASSIUM SERPL-SCNC: 4.3 MMOL/L
RAPID RVP RESULT: NOT DETECTED
RBC # BLD: 5.43 M/UL
RBC # FLD: 14.5 %
RF+CCP IGG SER-IMP: NEGATIVE
RHEUMATOID FACT SER QL: <10 IU/ML
SARS-COV-2 N GENE NPH QL NAA+PROBE: NOT DETECTED
SARS-COV-2 RNA PNL RESP NAA+PROBE: NOT DETECTED
SODIUM SERPL-SCNC: 142 MMOL/L
SODIUM SERPL-SCNC: 143 MMOL/L
T4 FREE SERPL-MCNC: 1.1 NG/DL
TRANSFERRIN SERPL-MCNC: 243 MG/DL
TSH SERPL-ACNC: 2.76 UIU/ML
VIT B12 SERPL-MCNC: 357 PG/ML
WBC # FLD AUTO: 7.54 K/UL

## 2022-01-19 ENCOUNTER — NON-APPOINTMENT (OUTPATIENT)
Age: 62
End: 2022-01-19

## 2022-01-19 ENCOUNTER — APPOINTMENT (OUTPATIENT)
Dept: RHEUMATOLOGY | Facility: CLINIC | Age: 62
End: 2022-01-19
Payer: COMMERCIAL

## 2022-01-19 VITALS
SYSTOLIC BLOOD PRESSURE: 158 MMHG | HEIGHT: 70 IN | DIASTOLIC BLOOD PRESSURE: 78 MMHG | WEIGHT: 260 LBS | BODY MASS INDEX: 37.22 KG/M2 | TEMPERATURE: 98 F | OXYGEN SATURATION: 97 % | HEART RATE: 107 BPM

## 2022-01-19 PROCEDURE — 99204 OFFICE O/P NEW MOD 45 MIN: CPT

## 2022-01-19 RX ORDER — BENZONATATE 200 MG/1
200 CAPSULE ORAL
Qty: 30 | Refills: 0 | Status: DISCONTINUED | COMMUNITY
Start: 2021-09-15

## 2022-01-19 RX ORDER — AZITHROMYCIN 250 MG/1
250 TABLET, FILM COATED ORAL
Qty: 6 | Refills: 0 | Status: DISCONTINUED | COMMUNITY
Start: 2021-09-10

## 2022-01-19 RX ORDER — IPRATROPIUM BROMIDE 0.5 MG/2.5ML
0.02 SOLUTION RESPIRATORY (INHALATION)
Qty: 300 | Refills: 0 | Status: DISCONTINUED | COMMUNITY
Start: 2021-09-15

## 2022-01-21 ENCOUNTER — NON-APPOINTMENT (OUTPATIENT)
Age: 62
End: 2022-01-21

## 2022-01-24 ENCOUNTER — LABORATORY RESULT (OUTPATIENT)
Age: 62
End: 2022-01-24

## 2022-01-25 LAB
C3 SERPL-MCNC: 133 MG/DL
C4 SERPL-MCNC: 36 MG/DL
CK SERPL-CCNC: 438 U/L
COVID-19 SPIKE DOMAIN ANTIBODY INTERPRETATION: POSITIVE
CRP SERPL-MCNC: 7 MG/L
ERYTHROCYTE [SEDIMENTATION RATE] IN BLOOD BY WESTERGREN METHOD: 30 MM/HR
SARS-COV-2 AB SERPL IA-ACNC: 132 U/ML
THYROGLOB AB SERPL-ACNC: <20 IU/ML
THYROPEROXIDASE AB SERPL IA-ACNC: <10 IU/ML

## 2022-01-26 LAB
ANA SER IF-ACNC: NEGATIVE
DSDNA AB SER-ACNC: <12 IU/ML

## 2022-02-02 LAB
ALBUMIN MFR SERPL ELPH: 60.1 %
ALBUMIN SERPL-MCNC: 3.9 G/DL
ALBUMIN/GLOB SERPL: 1.5 RATIO
ALPHA1 GLOB MFR SERPL ELPH: 5 %
ALPHA1 GLOB SERPL ELPH-MCNC: 0.3 G/DL
ALPHA2 GLOB MFR SERPL ELPH: 10.9 %
ALPHA2 GLOB SERPL ELPH-MCNC: 0.7 G/DL
B-GLOBULIN MFR SERPL ELPH: 11.8 %
B-GLOBULIN SERPL ELPH-MCNC: 0.8 G/DL
ENA RNP AB SER IA-ACNC: <0.2 AL
ENA SM AB SER IA-ACNC: <0.2 AL
ENA SS-A AB SER IA-ACNC: <0.2 AL
ENA SS-B AB SER IA-ACNC: <0.2 AL
GAMMA GLOB FLD ELPH-MCNC: 0.8 G/DL
GAMMA GLOB MFR SERPL ELPH: 12.2 %
INTERPRETATION SERPL IEP-IMP: NORMAL
M PROTEIN SPEC IFE-MCNC: NORMAL
PROT SERPL-MCNC: 6.5 G/DL
PROT SERPL-MCNC: 6.5 G/DL

## 2022-02-04 ENCOUNTER — APPOINTMENT (OUTPATIENT)
Dept: RHEUMATOLOGY | Facility: CLINIC | Age: 62
End: 2022-02-04
Payer: COMMERCIAL

## 2022-02-04 PROCEDURE — 99442: CPT

## 2022-02-25 ENCOUNTER — APPOINTMENT (OUTPATIENT)
Dept: CARDIOLOGY | Facility: CLINIC | Age: 62
End: 2022-02-25
Payer: COMMERCIAL

## 2022-02-25 VITALS
BODY MASS INDEX: 37.08 KG/M2 | OXYGEN SATURATION: 90 % | TEMPERATURE: 98.7 F | DIASTOLIC BLOOD PRESSURE: 70 MMHG | HEART RATE: 93 BPM | WEIGHT: 259 LBS | HEIGHT: 70 IN | SYSTOLIC BLOOD PRESSURE: 142 MMHG

## 2022-02-25 DIAGNOSIS — R70.0 ELEVATED ERYTHROCYTE SEDIMENTATION RATE: ICD-10-CM

## 2022-02-25 PROCEDURE — 99214 OFFICE O/P EST MOD 30 MIN: CPT

## 2022-02-25 NOTE — HISTORY OF PRESENT ILLNESS
[FreeTextEntry1] : This is a 61 year old male who presents today for routine follow up. PMHx includes HTN, HLD, vitamin d def, CHERELLE, DM and GERD. Pt had a recent folow up with Rheum regarding elevated ESR/CPK , Positive DILCIA, joint pain and fatigue. He has been on multiple courses of oral steroids which have improved his symptoms. He reports he has been feeling better overall and has not been experiencing his usual chronic fatigue. Denies chest pain, dyspnea, dizziness, palpations, changes in appetite/bowel habits, nausea, vomiting, abdominal pain.\par

## 2022-04-04 ENCOUNTER — NON-APPOINTMENT (OUTPATIENT)
Age: 62
End: 2022-04-04

## 2022-04-04 LAB
ALBUMIN SERPL ELPH-MCNC: 4.2 G/DL
ALP BLD-CCNC: 252 U/L
ALT SERPL-CCNC: 31 U/L
ANION GAP SERPL CALC-SCNC: 12 MMOL/L
APPEARANCE: CLEAR
AST SERPL-CCNC: 21 U/L
BACTERIA: NEGATIVE
BILIRUB DIRECT SERPL-MCNC: 0.1 MG/DL
BILIRUB INDIRECT SERPL-MCNC: NORMAL MG/DL
BILIRUB SERPL-MCNC: <0.2 MG/DL
BILIRUBIN URINE: NEGATIVE
BLOOD URINE: NEGATIVE
BUN SERPL-MCNC: 21 MG/DL
CALCIUM SERPL-MCNC: 9.7 MG/DL
CHLORIDE SERPL-SCNC: 103 MMOL/L
CHOLEST SERPL-MCNC: 198 MG/DL
CK SERPL-CCNC: 421 U/L
CO2 SERPL-SCNC: 22 MMOL/L
COLOR: NORMAL
CREAT SERPL-MCNC: 1.23 MG/DL
CRP SERPL-MCNC: 12 MG/L
EBV EA AB SER IA-ACNC: <5 U/ML
EBV EA AB TITR SER IF: POSITIVE
EBV EA IGG SER QL IA: >600 U/ML
EBV EA IGG SER-ACNC: NEGATIVE
EBV EA IGM SER IA-ACNC: POSITIVE
EBV PATRN SPEC IB-IMP: NORMAL
EBV VCA IGG SER IA-ACNC: >750 U/ML
EBV VCA IGM SER QL IA: 69.7 U/ML
EPSTEIN-BARR VIRUS CAPSID ANTIGEN IGG: POSITIVE
ERYTHROCYTE [SEDIMENTATION RATE] IN BLOOD BY WESTERGREN METHOD: 40 MM/HR
ESTIMATED AVERAGE GLUCOSE: 192 MG/DL
GLUCOSE QUALITATIVE U: ABNORMAL
GLUCOSE SERPL-MCNC: 331 MG/DL
HBA1C MFR BLD HPLC: 8.3 %
HDLC SERPL-MCNC: 49 MG/DL
HYALINE CASTS: 0 /LPF
KETONES URINE: NEGATIVE
LDLC SERPL CALC-MCNC: 122 MG/DL
LEUKOCYTE ESTERASE URINE: NEGATIVE
MICROSCOPIC-UA: NORMAL
NITRITE URINE: NEGATIVE
NONHDLC SERPL-MCNC: 149 MG/DL
PH URINE: 6
POTASSIUM SERPL-SCNC: 4.4 MMOL/L
PROT SERPL-MCNC: 7.1 G/DL
PROTEIN URINE: ABNORMAL
RED BLOOD CELLS URINE: 0 /HPF
SODIUM SERPL-SCNC: 137 MMOL/L
SPECIFIC GRAVITY URINE: 1.03
SQUAMOUS EPITHELIAL CELLS: 0 /HPF
TRIGL SERPL-MCNC: 133 MG/DL
UROBILINOGEN URINE: NORMAL
WHITE BLOOD CELLS URINE: 0 /HPF

## 2022-04-05 ENCOUNTER — OUTPATIENT (OUTPATIENT)
Dept: OUTPATIENT SERVICES | Facility: HOSPITAL | Age: 62
LOS: 1 days | Discharge: HOME | End: 2022-04-05

## 2022-04-05 DIAGNOSIS — K60.3 ANAL FISTULA: Chronic | ICD-10-CM

## 2022-04-05 DIAGNOSIS — Z98.890 OTHER SPECIFIED POSTPROCEDURAL STATES: Chronic | ICD-10-CM

## 2022-04-05 DIAGNOSIS — Z90.79 ACQUIRED ABSENCE OF OTHER GENITAL ORGAN(S): Chronic | ICD-10-CM

## 2022-04-05 DIAGNOSIS — R70.0 ELEVATED ERYTHROCYTE SEDIMENTATION RATE: ICD-10-CM

## 2022-04-05 DIAGNOSIS — Z90.49 ACQUIRED ABSENCE OF OTHER SPECIFIED PARTS OF DIGESTIVE TRACT: Chronic | ICD-10-CM

## 2022-04-06 ENCOUNTER — OUTPATIENT (OUTPATIENT)
Dept: OUTPATIENT SERVICES | Facility: HOSPITAL | Age: 62
LOS: 1 days | Discharge: HOME | End: 2022-04-06

## 2022-04-06 DIAGNOSIS — K60.3 ANAL FISTULA: Chronic | ICD-10-CM

## 2022-04-06 DIAGNOSIS — Z90.49 ACQUIRED ABSENCE OF OTHER SPECIFIED PARTS OF DIGESTIVE TRACT: Chronic | ICD-10-CM

## 2022-04-06 DIAGNOSIS — Z98.890 OTHER SPECIFIED POSTPROCEDURAL STATES: Chronic | ICD-10-CM

## 2022-04-06 DIAGNOSIS — R70.0 ELEVATED ERYTHROCYTE SEDIMENTATION RATE: ICD-10-CM

## 2022-04-06 DIAGNOSIS — Z90.79 ACQUIRED ABSENCE OF OTHER GENITAL ORGAN(S): Chronic | ICD-10-CM

## 2022-04-07 ENCOUNTER — NON-APPOINTMENT (OUTPATIENT)
Age: 62
End: 2022-04-07

## 2022-04-07 ENCOUNTER — APPOINTMENT (OUTPATIENT)
Dept: [facility]Y | Facility: CLINIC | Age: 62
End: 2022-04-07
Payer: COMMERCIAL

## 2022-04-07 VITALS
HEART RATE: 106 BPM | DIASTOLIC BLOOD PRESSURE: 84 MMHG | HEIGHT: 70 IN | BODY MASS INDEX: 37.83 KG/M2 | WEIGHT: 264.25 LBS | OXYGEN SATURATION: 96 % | SYSTOLIC BLOOD PRESSURE: 149 MMHG

## 2022-04-07 DIAGNOSIS — R53.83 OTHER FATIGUE: ICD-10-CM

## 2022-04-07 PROCEDURE — 99203 OFFICE O/P NEW LOW 30 MIN: CPT

## 2022-04-07 RX ORDER — PREDNISONE 50 MG/1
TABLET ORAL
Refills: 0 | Status: DISCONTINUED | COMMUNITY
End: 2022-04-07

## 2022-04-07 RX ORDER — PREDNISONE 5 MG/1
5 TABLET ORAL
Qty: 35 | Refills: 0 | Status: DISCONTINUED | COMMUNITY
Start: 2021-12-21 | End: 2022-04-07

## 2022-04-07 NOTE — HISTORY OF PRESENT ILLNESS
[de-identified] : 61 M with hx of T1DM, asthma referred here for fatigue, elevated ESR, CRP. Pt had Nov 1 cholecystectomy. Developed extreme fatigue after that. Extensive workup, including rheumatologic workup were all negative. EBV IgM was noted to be positive. However pt started feeling better on his own about a month ago. Has mild pain in finger and toe joints. Feels well otherwise. Denies unintentional weight loss, night sweat. He is up to date on colonoscopy. PSA was normal. No family hx of blood disorder. Son had kidney cancer.\par \par Denies HA. CP, SOB, abd pain, constipation, diarrhea, melena, hematuria, dysuria. \par

## 2022-04-07 NOTE — PHYSICAL EXAM
[Fully active, able to carry on all pre-disease performance without restriction] : Status 0 - Fully active, able to carry on all pre-disease performance without restriction [Normal] : affect appropriate [de-identified] : +mild b/l LE edema

## 2022-04-07 NOTE — ASSESSMENT
[FreeTextEntry1] : 61 M with hx of T1DM, asthma referred here for fatigue, elevated ESR, CRP. Pt had Nov 1 cholecystectomy. Developed extreme fatigue after that. Extensive workup, including rheumatologic workup were all negative. EBV IgM was noted to be positive. However pt started feeling better on his own about a month ago. Has mild pain in finger and toe joints. Feels well otherwise. Denies unintentional weight loss, night sweat. He is up to date on colonoscopy. PSA was normal. No family hx of blood disorder. Son had kidney cancer.\par \par CBC reviewed showed no concerning findings. Normal B12, folate, Iron studies. \par No hematologic or oncologic concern for the cause of fatigue\par Pt now feels well. Will defer additional testing given extensive tests were done recently\par continue to follow up with PCP\par \par \par RTC as needed

## 2022-06-09 NOTE — PROGRESS NOTE ADULT - PROVIDER SPECIALTY LIST ADULT
Pulmonology [address] [facility]Y [facility]  [address] [address]  Oncology Progress Note  Jamilah Powers, 1948, [id number]  [address]  ______________________________________________________________________________________________________________    Subjective     Chief Complaint   Patient presents with    Follow-up     Neuroendocrine carcinoma of lung [facility])       History of present illness/Cancer History:   Oncology History   Neuroendocrine carcinoma of lung ([facility] [address] )   [facility] [address] )     Bladder cancer ([facility] )     Hood Marie MD;  [facility]: [facility] GI LAB; Service:     CYSTOSCOPY Left 3/18/2016    Procedure: CYSTOSCOPY, BLADDER BIOPY, URETEROSCOPY, BLADDER FULGERATION (C-ARM), left stent exchange, left retrograde pylelogram;  Surgeon: Bing Larios MD;  [facility]: [address] [place] [address];  [id number]); Surgeon: Andres Marsh MD;  [facility]: [facility] MAIN OR;  Service: Pain Management     EPIDURAL BLOCK INJECTION N/A 12/28/2021    Procedure: C6 C7 cervical epidural steroid injection ( 54086); Surgeon: Andres Marsh MD;  [facility]: [facility] MAIN OR;  Service: Pain Management     EPIDURAL BLOCK INJECTION N/A 3/4/2022    Procedure: C6 C7 cervical epidural steroid injection (31541); Surgeon: Andres Marsh MD;  [facility]: [facility] [id number]); Surgeon: Andres Marsh MD;  [facility]: [facility] OR;  Service: Pain Management     NERVE BLOCK Bilateral 1/28/2022    Procedure: L3 L4 L5 S1 medial branch block #2 ( [id number]);   Surgeon: Andres Marsh MD;  [facility]: [facility] OR;  Service: Pain Management     OTHER SURGICAL HISTORY  02/12/2016    TRANSCATH INTRAVASCULAR STENT PLACEMENT PERCUTANEOUS RENAL LEFT LAST ASSESSED 2/24/16    DE AAA REPAIR,MODULR BIFURCATED [address] [place] [address] Rajiv Fell;  Surgeon: Saravanan Morales MD;  [facility];  Service: Thoracic    RADIOFREQUENCY ABLATION Left 3/25/2022    Procedure: L4 L5 S1 RADIO FREQUENCY ablation (13388;  Surgeon: Loreto Perez MD;  [facility]: [facility] MAIN OR;  Service: Pain Management     RADIOFREQUENCY ABLATION Right 2022    Procedure: L4 L5 S1 radio frequency ablation ( [id number]);   Surgeon: Loreto Perez MD;  [facility]: Emanate Health/Queen of the Valley 

## 2022-08-09 ENCOUNTER — NON-APPOINTMENT (OUTPATIENT)
Age: 62
End: 2022-08-09

## 2022-08-12 ENCOUNTER — LABORATORY RESULT (OUTPATIENT)
Age: 62
End: 2022-08-12

## 2022-08-12 ENCOUNTER — NON-APPOINTMENT (OUTPATIENT)
Age: 62
End: 2022-08-12

## 2022-08-12 ENCOUNTER — APPOINTMENT (OUTPATIENT)
Dept: CARDIOLOGY | Facility: CLINIC | Age: 62
End: 2022-08-12

## 2022-08-12 VITALS
WEIGHT: 260.31 LBS | HEIGHT: 70 IN | BODY MASS INDEX: 37.26 KG/M2 | DIASTOLIC BLOOD PRESSURE: 80 MMHG | TEMPERATURE: 98.3 F | OXYGEN SATURATION: 96 % | HEART RATE: 88 BPM | SYSTOLIC BLOOD PRESSURE: 140 MMHG

## 2022-08-12 DIAGNOSIS — H61.20 IMPACTED CERUMEN, UNSPECIFIED EAR: ICD-10-CM

## 2022-08-12 DIAGNOSIS — R76.8 OTHER SPECIFIED ABNORMAL IMMUNOLOGICAL FINDINGS IN SERUM: ICD-10-CM

## 2022-08-12 DIAGNOSIS — R53.83 OTHER FATIGUE: ICD-10-CM

## 2022-08-12 DIAGNOSIS — Z99.89 OBSTRUCTIVE SLEEP APNEA (ADULT) (PEDIATRIC): ICD-10-CM

## 2022-08-12 DIAGNOSIS — Z87.898 PERSONAL HISTORY OF OTHER SPECIFIED CONDITIONS: ICD-10-CM

## 2022-08-12 DIAGNOSIS — G47.33 OBSTRUCTIVE SLEEP APNEA (ADULT) (PEDIATRIC): ICD-10-CM

## 2022-08-12 PROCEDURE — 93000 ELECTROCARDIOGRAM COMPLETE: CPT

## 2022-08-12 PROCEDURE — 99214 OFFICE O/P EST MOD 30 MIN: CPT | Mod: 25

## 2022-08-12 RX ORDER — AMLODIPINE BESYLATE 5 MG/1
5 TABLET ORAL
Qty: 90 | Refills: 1 | Status: DISCONTINUED | COMMUNITY
Start: 2021-12-21 | End: 2022-08-12

## 2022-08-12 NOTE — HISTORY OF PRESENT ILLNESS
[FreeTextEntry1] : pt presents for f/u pt with improved fatigue .pt with ocassional symptoms much improved .pt also complains of dry mouth attributes it to norvasc and stopped it .pt here to address pt denies any chest  pain dizziness ,lightheadedness ,nausea vomiting diaphoresis\par

## 2022-08-12 NOTE — PHYSICAL EXAM

## 2022-08-24 ENCOUNTER — NON-APPOINTMENT (OUTPATIENT)
Age: 62
End: 2022-08-24

## 2022-09-02 NOTE — ED ADULT NURSE NOTE - NSIMPLEMENTINTERV_GEN_ALL_ED
Last office visit: 6/30/22  Next office visit: 12/1/22  Last refill: 6/7/22    
Implemented All Universal Safety Interventions:  [place] to call system. Call bell, personal items and telephone within reach. Instruct patient to call for assistance. Room bathroom lighting operational. Non-slip footwear when patient is off stretcher. Physically safe environment: no spills, clutter or unnecessary equipment. Stretcher in lowest position, wheels locked, appropriate side rails in place.

## 2022-10-08 ENCOUNTER — APPOINTMENT (OUTPATIENT)
Dept: ULTRASOUND IMAGING | Facility: CLINIC | Age: 62
End: 2022-10-08

## 2022-10-21 NOTE — ED STATDOCS - CARDIAC, MLM
normal rate, regular rhythm, and no murmur.
PAST MEDICAL HISTORY:  Arthritis     HLD (hyperlipidemia)     HTN (hypertension)

## 2022-10-27 ENCOUNTER — RESULT REVIEW (OUTPATIENT)
Age: 62
End: 2022-10-27

## 2022-11-18 ENCOUNTER — APPOINTMENT (OUTPATIENT)
Dept: CARDIOLOGY | Facility: CLINIC | Age: 62
End: 2022-11-18

## 2022-11-18 ENCOUNTER — NON-APPOINTMENT (OUTPATIENT)
Age: 62
End: 2022-11-18

## 2022-11-18 VITALS
DIASTOLIC BLOOD PRESSURE: 72 MMHG | BODY MASS INDEX: 36.79 KG/M2 | TEMPERATURE: 98.4 F | WEIGHT: 257 LBS | OXYGEN SATURATION: 97 % | HEART RATE: 81 BPM | RESPIRATION RATE: 18 BRPM | SYSTOLIC BLOOD PRESSURE: 128 MMHG | HEIGHT: 70 IN

## 2022-11-18 DIAGNOSIS — J45.909 UNSPECIFIED ASTHMA, UNCOMPLICATED: ICD-10-CM

## 2022-11-18 PROCEDURE — 99214 OFFICE O/P EST MOD 30 MIN: CPT | Mod: 25

## 2022-11-18 PROCEDURE — 93000 ELECTROCARDIOGRAM COMPLETE: CPT

## 2022-11-18 NOTE — HISTORY OF PRESENT ILLNESS
[FreeTextEntry1] : This is a 62 year male with a Pmhx of HTN HLD asthma DM GERD who presents to the office for routine follow up. pt reports feeling well Denies any complaints \par \par Pt denies any CP, SOB, heart palpitations, dizziness, abdominal pain N/V/D fever or chills\par

## 2022-11-19 ENCOUNTER — APPOINTMENT (OUTPATIENT)
Dept: ULTRASOUND IMAGING | Facility: CLINIC | Age: 62
End: 2022-11-19

## 2022-11-19 ENCOUNTER — OUTPATIENT (OUTPATIENT)
Dept: OUTPATIENT SERVICES | Facility: HOSPITAL | Age: 62
LOS: 1 days | End: 2022-11-19
Payer: COMMERCIAL

## 2022-11-19 DIAGNOSIS — Z98.890 OTHER SPECIFIED POSTPROCEDURAL STATES: Chronic | ICD-10-CM

## 2022-11-19 DIAGNOSIS — Z00.8 ENCOUNTER FOR OTHER GENERAL EXAMINATION: ICD-10-CM

## 2022-11-19 DIAGNOSIS — Z90.49 ACQUIRED ABSENCE OF OTHER SPECIFIED PARTS OF DIGESTIVE TRACT: Chronic | ICD-10-CM

## 2022-11-19 DIAGNOSIS — K60.3 ANAL FISTULA: Chronic | ICD-10-CM

## 2022-11-19 DIAGNOSIS — Z90.79 ACQUIRED ABSENCE OF OTHER GENITAL ORGAN(S): Chronic | ICD-10-CM

## 2022-11-19 PROCEDURE — 76700 US EXAM ABDOM COMPLETE: CPT

## 2022-11-19 PROCEDURE — 76700 US EXAM ABDOM COMPLETE: CPT | Mod: 26

## 2022-12-17 NOTE — ASU DISCHARGE PLAN (ADULT/PEDIATRIC) - HISTORY OF COVID-19 VACCINATION
68M w/ PMHx of migraines, HTN, HLD, COPD not on home O2, Prostate Cancer s/p radiation and resection 4 years ago, recent replacement penile implant 4 weeks ago presents w/ SBO associated w/ L inguinal hernia.    Plan:  - Admit to surgical service under Dr. Milligan  - NPO/IVF  - Hold home PO meds until able to tolerate PO diet  - NGT to LCWS, monitor output, monitor lytes and replete PRN  - Plan for OR on Monday 12/19  - No antibiotics indicated at this time  - Serial abdominal exams q4hrs  - Monitor for return of bowel function  - Low threshold to take patient to OR sooner  - GI/DVT ppx    [facility]  Spectra [id number] Yes 68M w/ PMHx of migraines, HTN, HLD, COPD not on home O2, Prostate Cancer s/p radiation and resection 4 years ago, recent replacement penile implant 4 weeks ago presents w/ SBO associated w/ L inguinal hernia.    Plan:  - Admit to surgical service under Dr. Milligan  - NPO/IVF  - Hold home PO meds until able to tolerate PO  - NGT to LCWS, monitor output, monitor lytes and replete PRN  - Plan for OR early this week for robotic possible laparoscopic left inguinal hernia repair  - Serial abdominal exams q4hrs  - Monitor for return of bowel function  - Low threshold to take patient to OR sooner if   - GI/DVT ppx  - hemodynamic monitoring/vital signs q4h  - Strict Is and Os q4h  - Pain control  - activity as tolerated  - Imaging: AM obstructive series  - incentive spirometer  - aspiration precautions      [facility]  Spectra [id number]

## 2023-04-19 NOTE — END OF VISIT
[Diabetes Foot Care] : diabetes foot care [Carbohydrate Consistent Diet] : carbohydrate consistent diet [Time Spent: ___ minutes] : I have spent [unfilled] minutes of time on the encounter. [Hypoglycemia Management] : hypoglycemia management [Action and use of Insulin] : action and use of short and long-acting insulin [Injection Technique, Storage, Sharps Disposal] : injection technique, storage, and sharps disposal [Bisphosphonate Therapy] : Risks and benefits of bisphosphonate therapy were  discussed with the patient including gastroesophageal irritation, osteonecrosis of the jaw, and atypical femur fractures, and acute phase reaction [Diabetic Medications] : Risks and benefits of diabetic medications were discussed [FreeTextEntry1] : 1.Type 2 diabetes\par Recent hemoglobin A1c is 8.2%, uptrending since last endocrinology visit\par Continue Trulicity 4.5 mg weekly,  only restarted Trulicity 1 month ago\par Continue Segluromet 7.5-1000 mg twice daily\par Increase Basaglar 11 units bedtime\par On ramipril for hypertension and atorvastatin for hyperlipidemia, last LDL 72 from April 2023\par \par 2.Osteoporosis\par Last bone density from October 2022\par Currently on alendronate weekly, will consider drug holiday as she is close to 5 years of therapy\par Can repeat DXA in Nov 2023\par \par 3.Adrenal adenoma \par CT scan from April 2022, noted stable left adrenal adenoma and partial left nephrectomy without evidence of residual or recurrent tumor.\par Biochemical work-up negative in past\par Noted renin activity elevated at 5.8 and aldosterone 11.8.  Of note patient has history of left renal mass and had partial nephrectomy following with Dr. Burdick. Follow up nephrology \par Will do 1mg dexamethasone overnight suppression test, given instructions and scipt to do test correctly.\par \par 4. Papillary thyroid cancer\par Status post right hemithyroidectomy in Oct 2004; surgical pathology showed 7mm papillary CA in background of Hashimoto's thyroiditis,oncocytic Follicular variant of Papillary Carcinoma\par Considered low risk, TSH goal 0.5-2\par Levothyroxine 100 mcg 6.5 tab/week\par Can repeat thyroid sonogram this year \par \par Answered all questions today; patient verbalized understanding of the above\par RTC in 3-6 months

## 2023-06-09 ENCOUNTER — NON-APPOINTMENT (OUTPATIENT)
Age: 63
End: 2023-06-09

## 2023-06-09 ENCOUNTER — APPOINTMENT (OUTPATIENT)
Dept: CARDIOLOGY | Facility: CLINIC | Age: 63
End: 2023-06-09
Payer: COMMERCIAL

## 2023-06-09 VITALS
HEIGHT: 70 IN | DIASTOLIC BLOOD PRESSURE: 60 MMHG | OXYGEN SATURATION: 98 % | TEMPERATURE: 98.4 F | HEART RATE: 95 BPM | SYSTOLIC BLOOD PRESSURE: 145 MMHG

## 2023-06-09 DIAGNOSIS — Z12.11 ENCOUNTER FOR SCREENING FOR MALIGNANT NEOPLASM OF COLON: ICD-10-CM

## 2023-06-09 DIAGNOSIS — Z71.85 ENCOUNTER FOR IMMUNIZATION SAFETY COUNSELING: ICD-10-CM

## 2023-06-09 DIAGNOSIS — R80.9 PROTEINURIA, UNSPECIFIED: ICD-10-CM

## 2023-06-09 DIAGNOSIS — R74.8 ABNORMAL LEVELS OF OTHER SERUM ENZYMES: ICD-10-CM

## 2023-06-09 DIAGNOSIS — E55.9 VITAMIN D DEFICIENCY, UNSPECIFIED: ICD-10-CM

## 2023-06-09 DIAGNOSIS — Z00.00 ENCOUNTER FOR GENERAL ADULT MEDICAL EXAMINATION W/OUT ABNORMAL FINDINGS: ICD-10-CM

## 2023-06-09 DIAGNOSIS — E78.5 HYPERLIPIDEMIA, UNSPECIFIED: ICD-10-CM

## 2023-06-09 DIAGNOSIS — R94.31 ABNORMAL ELECTROCARDIOGRAM [ECG] [EKG]: ICD-10-CM

## 2023-06-09 DIAGNOSIS — R79.89 OTHER SPECIFIED ABNORMAL FINDINGS OF BLOOD CHEMISTRY: ICD-10-CM

## 2023-06-09 DIAGNOSIS — Z13.228 ENCOUNTER FOR SCREENING FOR OTHER METABOLIC DISORDERS: ICD-10-CM

## 2023-06-09 DIAGNOSIS — Z12.5 ENCOUNTER FOR SCREENING FOR MALIGNANT NEOPLASM OF PROSTATE: ICD-10-CM

## 2023-06-09 DIAGNOSIS — I10 ESSENTIAL (PRIMARY) HYPERTENSION: ICD-10-CM

## 2023-06-09 DIAGNOSIS — E11.9 TYPE 2 DIABETES MELLITUS W/OUT COMPLICATIONS: ICD-10-CM

## 2023-06-09 PROCEDURE — 99396 PREV VISIT EST AGE 40-64: CPT

## 2023-06-09 PROCEDURE — 93306 TTE W/DOPPLER COMPLETE: CPT

## 2023-06-09 PROCEDURE — 93000 ELECTROCARDIOGRAM COMPLETE: CPT

## 2023-06-09 PROCEDURE — 99214 OFFICE O/P EST MOD 30 MIN: CPT | Mod: 25

## 2023-06-09 NOTE — HISTORY OF PRESENT ILLNESS
[FreeTextEntry1] : This is a 61 y/o male with a pmhx of HTN HLD asthma DM GERD who presents to the office for a follow up. Patient feels well and has no acute concerns or complaints. Patient denies chest pain,   palpitations, dizziness, syncope, changes in bowel/bladder habits or appetite. \par pt with rare dyspnea

## 2023-06-14 ENCOUNTER — APPOINTMENT (OUTPATIENT)
Dept: CARDIOLOGY | Facility: CLINIC | Age: 63
End: 2023-06-14

## 2023-06-15 ENCOUNTER — NON-APPOINTMENT (OUTPATIENT)
Age: 63
End: 2023-06-15

## 2023-06-16 ENCOUNTER — APPOINTMENT (OUTPATIENT)
Dept: CARDIOLOGY | Facility: CLINIC | Age: 63
End: 2023-06-16
Payer: COMMERCIAL

## 2023-06-16 PROCEDURE — A9500: CPT

## 2023-06-16 PROCEDURE — 93015 CV STRESS TEST SUPVJ I&R: CPT

## 2023-06-16 PROCEDURE — 78452 HT MUSCLE IMAGE SPECT MULT: CPT

## 2023-10-18 NOTE — ASU DISCHARGE PLAN (ADULT/PEDIATRIC) - C. MAKE IMPORTANT PERSONAL OR BUSINESS DECISIONS
Regarding: M14mo old-constiptation-still eating/drinking like normal. Cries when trying to have a BM  ----- Message from Shannan Brar sent at 10/18/2023 11:22 AM CDT -----  Patient Name: David Meyer    Specialist or PCP Name: Dr Kate Mendoza    Symptoms: constipation-still eating and drinking like normal-cries when trying to have a BM    Pregnant (females aged 13-60. If Yes, how long?) : NA    Call Back # : [phone]-father Qousi    Which State are you currently located in?: [place]    Name of Clinic Site / Acct# : [facility]    Use following scripting for patients waiting for a callback:   \"Nurse callback times vary based on call volumes; please be aware the return phone call may come from an unidentified or out of state phone number. If your symptoms worsen or become life threatening while waiting, you should seek immediate assistance by calling 911 or going to the ER for evaluation.\"     Statement Selected

## 2023-10-30 NOTE — REASON FOR VISIT
If provider orders tests at today's visit, patient would like to be contacted via Either Telephone OR Intralignhart.  If to contact patient by phone, patient's preferred phone # is [phone] (Cell) and it is OK to leave message on voice mail or with family member.  If medications are ordered at today's visit, the pharmacy name/location patient would like them to be sent to is     [id number] - [place], [place] - [address]  [address] [place] [address]  Phone: [phone] Fax: [phone]       [FreeTextEntry1] : Follow up

## 2024-01-09 RX ORDER — AMLODIPINE BESYLATE 10 MG/1
10 TABLET ORAL
Qty: 90 | Refills: 1 | Status: ACTIVE | COMMUNITY
Start: 2022-04-13 | End: 1900-01-01

## 2024-01-15 NOTE — ED STATDOCS - NS ED SCRIBE STATEMENT
Attending 1 Principal Discharge DX:	Influenza A  Secondary Diagnosis:	2019 novel coronavirus disease (COVID-19)

## 2024-02-11 ENCOUNTER — NON-APPOINTMENT (OUTPATIENT)
Age: 64
End: 2024-02-11

## 2024-04-02 NOTE — DISCHARGE NOTE PROVIDER - NSDCCPCAREPLAN_GEN_ALL_CORE_FT
Nursing home PRINCIPAL DISCHARGE DIAGNOSIS  Diagnosis: Right epididymitis  Assessment and Plan of Treatment:

## 2024-04-24 LAB
25(OH)D3 SERPL-MCNC: 34.3 NG/ML
25(OH)D3 SERPL-MCNC: 35.2 NG/ML
ALBUMIN SERPL ELPH-MCNC: 4.1 G/DL
ALBUMIN SERPL ELPH-MCNC: 4.2 G/DL
ALBUMIN SERPL ELPH-MCNC: 4.3 G/DL
ALP BLD-CCNC: 145 U/L
ALP BLD-CCNC: 149 U/L
ALP BLD-CCNC: 208 U/L
ALT SERPL-CCNC: 28 U/L
ALT SERPL-CCNC: 31 U/L
ALT SERPL-CCNC: 47 U/L
ANA PAT FLD IF-IMP: ABNORMAL
ANA SER IF-ACNC: ABNORMAL
ANION GAP SERPL CALC-SCNC: 10 MMOL/L
ANION GAP SERPL CALC-SCNC: 11 MMOL/L
ANION GAP SERPL CALC-SCNC: 12 MMOL/L
APPEARANCE: CLEAR
APPEARANCE: CLEAR
AST SERPL-CCNC: 25 U/L
AST SERPL-CCNC: 30 U/L
AST SERPL-CCNC: 31 U/L
BACTERIA: NEGATIVE
BACTERIA: NEGATIVE /HPF
BASOPHILS # BLD AUTO: 0.04 K/UL
BASOPHILS NFR BLD AUTO: 0.6 %
BILIRUB DIRECT SERPL-MCNC: 0.1 MG/DL
BILIRUB DIRECT SERPL-MCNC: 0.1 MG/DL
BILIRUB INDIRECT SERPL-MCNC: 0.1 MG/DL
BILIRUB INDIRECT SERPL-MCNC: 0.1 MG/DL
BILIRUB SERPL-MCNC: 0.2 MG/DL
BILIRUBIN URINE: NEGATIVE
BILIRUBIN URINE: NEGATIVE
BLOOD URINE: NEGATIVE
BLOOD URINE: NEGATIVE
BUN SERPL-MCNC: 15 MG/DL
BUN SERPL-MCNC: 16 MG/DL
BUN SERPL-MCNC: 18 MG/DL
CALCIUM SERPL-MCNC: 9.3 MG/DL
CALCIUM SERPL-MCNC: 9.5 MG/DL
CALCIUM SERPL-MCNC: 9.5 MG/DL
CAST: 0 /LPF
CCP AB SER IA-ACNC: <8 UNITS
CHLORIDE SERPL-SCNC: 105 MMOL/L
CHLORIDE SERPL-SCNC: 107 MMOL/L
CHLORIDE SERPL-SCNC: 108 MMOL/L
CHOLEST SERPL-MCNC: 184 MG/DL
CHOLEST SERPL-MCNC: 187 MG/DL
CHOLEST SERPL-MCNC: 190 MG/DL
CK SERPL-CCNC: 432 U/L
CK SERPL-CCNC: 512 U/L
CK SERPL-CCNC: 588 U/L
CO2 SERPL-SCNC: 22 MMOL/L
CO2 SERPL-SCNC: 24 MMOL/L
CO2 SERPL-SCNC: 25 MMOL/L
COLOR: NORMAL
COLOR: YELLOW
CREAT SERPL-MCNC: 1.37 MG/DL
CREAT SERPL-MCNC: 1.39 MG/DL
CREAT SERPL-MCNC: 1.4 MG/DL
CREAT SPEC-SCNC: 147 MG/DL
CRP SERPL-MCNC: 9 MG/L
DSDNA AB SER-ACNC: <12 IU/ML
EBV EA AB SER IA-ACNC: <5 U/ML
EBV EA AB TITR SER IF: POSITIVE
EBV EA IGG SER QL IA: >600 U/ML
EBV EA IGG SER-ACNC: NEGATIVE
EBV EA IGM SER IA-ACNC: ABNORMAL
EBV PATRN SPEC IB-IMP: NORMAL
EBV VCA IGG SER IA-ACNC: >750 U/ML
EBV VCA IGM SER QL IA: 41.9 U/ML
EGFR: 58 ML/MIN/1.73M2
EGFR: 58 ML/MIN/1.73M2
EGFR: 59 ML/MIN/1.73M2
ENA SS-A AB SER IA-ACNC: <0.2 AL
ENA SS-B AB SER IA-ACNC: <0.2 AL
EOSINOPHIL # BLD AUTO: 0.35 K/UL
EOSINOPHIL NFR BLD AUTO: 5.1 %
EPITHELIAL CELLS: 2 /HPF
EPSTEIN-BARR VIRUS CAPSID ANTIGEN IGG: POSITIVE
ERYTHROCYTE [SEDIMENTATION RATE] IN BLOOD BY WESTERGREN METHOD: 33 MM/HR
ESTIMATED AVERAGE GLUCOSE: 128 MG/DL
ESTIMATED AVERAGE GLUCOSE: 128 MG/DL
ESTIMATED AVERAGE GLUCOSE: 171 MG/DL
GLUCOSE QUALITATIVE U: NEGATIVE
GLUCOSE QUALITATIVE U: NEGATIVE MG/DL
GLUCOSE SERPL-MCNC: 102 MG/DL
GLUCOSE SERPL-MCNC: 54 MG/DL
GLUCOSE SERPL-MCNC: 88 MG/DL
HBA1C MFR BLD HPLC: 6.1 %
HBA1C MFR BLD HPLC: 6.1 %
HBA1C MFR BLD HPLC: 7.6 %
HCT VFR BLD CALC: 42.5 %
HDLC SERPL-MCNC: 50 MG/DL
HDLC SERPL-MCNC: 51 MG/DL
HDLC SERPL-MCNC: 60 MG/DL
HGB BLD-MCNC: 13.6 G/DL
HYALINE CASTS: 1 /LPF
IMM GRANULOCYTES NFR BLD AUTO: 0.3 %
KETONES URINE: NEGATIVE
KETONES URINE: NEGATIVE MG/DL
LDLC SERPL CALC-MCNC: 108 MG/DL
LDLC SERPL CALC-MCNC: 112 MG/DL
LDLC SERPL CALC-MCNC: 118 MG/DL
LDLC SERPL DIRECT ASSAY-MCNC: 111 MG/DL
LEUKOCYTE ESTERASE URINE: NEGATIVE
LEUKOCYTE ESTERASE URINE: NEGATIVE
LYMPHOCYTES # BLD AUTO: 1.52 K/UL
LYMPHOCYTES NFR BLD AUTO: 22.4 %
MAGNESIUM SERPL-MCNC: 2 MG/DL
MAGNESIUM SERPL-MCNC: 2.1 MG/DL
MAN DIFF?: NORMAL
MCHC RBC-ENTMCNC: 26.5 PG
MCHC RBC-ENTMCNC: 32 GM/DL
MCV RBC AUTO: 82.8 FL
MICROALBUMIN 24H UR DL<=1MG/L-MCNC: 16.6 MG/DL
MICROALBUMIN/CREAT 24H UR-RTO: 113 MG/G
MICROSCOPIC-UA: NORMAL
MICROSCOPIC-UA: NORMAL
MONOCYTES # BLD AUTO: 0.71 K/UL
MONOCYTES NFR BLD AUTO: 10.4 %
NEUTROPHILS # BLD AUTO: 4.16 K/UL
NEUTROPHILS NFR BLD AUTO: 61.2 %
NITRITE URINE: NEGATIVE
NITRITE URINE: NEGATIVE
NONHDLC SERPL-MCNC: 124 MG/DL
NONHDLC SERPL-MCNC: 136 MG/DL
NONHDLC SERPL-MCNC: 140 MG/DL
PH URINE: 6
PH URINE: 6
PHOSPHATE SERPL-MCNC: 2.4 MG/DL
PLATELET # BLD AUTO: 309 K/UL
POTASSIUM SERPL-SCNC: 4 MMOL/L
POTASSIUM SERPL-SCNC: 4.3 MMOL/L
POTASSIUM SERPL-SCNC: 4.4 MMOL/L
PROT SERPL-MCNC: 6.5 G/DL
PROT SERPL-MCNC: 6.7 G/DL
PROT SERPL-MCNC: 7.4 G/DL
PROTEIN URINE: 30 MG/DL
PROTEIN URINE: ABNORMAL
PSA SERPL-MCNC: 2.34 NG/ML
RBC # BLD: 5.13 M/UL
RBC # FLD: 14.4 %
RED BLOOD CELLS URINE: 0 /HPF
RED BLOOD CELLS URINE: 1 /HPF
RF+CCP IGG SER-IMP: NEGATIVE
RHEUMATOID FACT SER QL: <10 IU/ML
SODIUM SERPL-SCNC: 141 MMOL/L
SODIUM SERPL-SCNC: 142 MMOL/L
SODIUM SERPL-SCNC: 142 MMOL/L
SPECIFIC GRAVITY URINE: 1.02
SPECIFIC GRAVITY URINE: 1.02
SQUAMOUS EPITHELIAL CELLS: 1 /HPF
T4 FREE SERPL-MCNC: 1.1 NG/DL
T4 FREE SERPL-MCNC: 1.1 NG/DL
TRIGL SERPL-MCNC: 141 MG/DL
TRIGL SERPL-MCNC: 77 MG/DL
TRIGL SERPL-MCNC: 93 MG/DL
TSH SERPL-ACNC: 1.49 UIU/ML
TSH SERPL-ACNC: 1.76 UIU/ML
TSH SERPL-ACNC: 2.03 UIU/ML
URATE SERPL-MCNC: 4.3 MG/DL
UROBILINOGEN URINE: 0.2 MG/DL
UROBILINOGEN URINE: NORMAL
WBC # FLD AUTO: 6.8 K/UL
WHITE BLOOD CELLS URINE: 0 /HPF
WHITE BLOOD CELLS URINE: 0 /HPF

## 2024-06-12 NOTE — DISCHARGE NOTE PROVIDER - CARE PROVIDER_API CALL
[place])  Cardiology; Internal Medicine  [address], [address]  [place] [address]  Phone: [phone]  Fax: [phone]  Follow Up Time:     José Grider  UROLOGY  [address] [place] [address]  Phone: [phone]  Fax: [phone]  Follow Up Time:
normal

## 2024-07-07 NOTE — ASU DISCHARGE PLAN (ADULT/PEDIATRIC) - FOLLOW UP APPOINTMENTS
832 Prolonged fetal monitoring  UA sent  Urine culture sent  Tylenol give  IV hydration x 2 liters  US of kidney/bladder done: WNL    30y  at 30w5d  No evidence of  labor  Pt reports feeling slightly better  US of kidneys and bladder: no cholelithiasis or hydronephrosis seen.  D/w Dr Ho  Discharge home with instructions   labor precautions  Patient to monitor fetal kick counts  Pt to follow up with OB as scheduled

## 2024-09-10 ENCOUNTER — RX RENEWAL (OUTPATIENT)
Age: 64
End: 2024-09-10

## 2024-10-02 ENCOUNTER — APPOINTMENT (OUTPATIENT)
Dept: SURGERY | Facility: CLINIC | Age: 64
End: 2024-10-02

## 2024-10-02 VITALS
HEART RATE: 76 BPM | WEIGHT: 250 LBS | OXYGEN SATURATION: 98 % | BODY MASS INDEX: 35.79 KG/M2 | SYSTOLIC BLOOD PRESSURE: 144 MMHG | DIASTOLIC BLOOD PRESSURE: 80 MMHG | HEIGHT: 70 IN | TEMPERATURE: 97.3 F | RESPIRATION RATE: 16 BRPM

## 2024-10-02 DIAGNOSIS — L72.9 FOLLICULAR CYST OF THE SKIN AND SUBCUTANEOUS TISSUE, UNSPECIFIED: ICD-10-CM

## 2024-10-02 PROCEDURE — 99214 OFFICE O/P EST MOD 30 MIN: CPT

## 2024-10-02 RX ORDER — FAMOTIDINE 40 MG/1
40 TABLET, FILM COATED ORAL
Refills: 0 | Status: ACTIVE | COMMUNITY

## 2024-10-02 RX ORDER — PYRIDOXINE HCL (VITAMIN B6) 100 MG
100 TABLET ORAL
Refills: 0 | Status: ACTIVE | COMMUNITY

## 2024-10-02 RX ORDER — AMLODIPINE BESYLATE 10 MG/1
10 TABLET ORAL
Refills: 0 | Status: ACTIVE | COMMUNITY

## 2024-10-02 RX ORDER — LOSARTAN POTASSIUM 100 MG/1
100 TABLET, FILM COATED ORAL
Refills: 0 | Status: ACTIVE | COMMUNITY

## 2024-10-02 RX ORDER — EZETIMIBE 10 MG/1
10 TABLET ORAL
Refills: 0 | Status: ACTIVE | COMMUNITY

## 2024-10-02 RX ORDER — ESOMEPRAZOLE MAGNESIUM 40 MG/1
40 CAPSULE, DELAYED RELEASE ORAL
Refills: 0 | Status: ACTIVE | COMMUNITY

## 2024-10-02 NOTE — HISTORY OF PRESENT ILLNESS
[FreeTextEntry1] : Karthikeyan is a 62 y/o male being seen for consultation, possible abscess  s/p Incision and drainage of recurrent ischiorectal abscess, seton placement for transsphincteric fistula 3/25/21  s/p anal fistulotomy and suture ligation of hemorrhoids on 6/11/21   s/p Laparoscopic cholecystectomy on 11/01/2021  Last seen 12/2021 - Has healed from the surgery. Path discussed - polyp with low grade dysplasia, margins clear. Ongoing fatigue and nausea of unclear etiology - does not appear to be d/t a surgical issue - I'll check CBC and a CMP and a RUQ US. Could the symptoms be d/t adrenal insufficiency?? Instructed to reach out to his Endocrinologist. All questions answered. RTO as needed - will call with test results.  Colonoscopy 10/27/22 - Rectum to 4 mm sessile polyp excised.  At 20 cm from the anal verge 33 mm sessile polyp excised.  Internal hemorrhoids.  Colon polyps as above 7 in total 1 polyp at 40 cm descending colon removed with snare the rest totally excised with biopsy forceps in the rectum mostly and at 70 cm from the anal verge.    Today pt reports feeling intermittent left buttock swelling and discomfort since 2020 that does not drain.  Soft BMs daily, no straining, no bleeding.  Denies prolapsing tissue or swelling.  No episodes of incontinence of stool or flatus.  Good appetite.  No c/o nausea/vomiting.  Denies fever and chills.  Not on anticoagulants.

## 2024-10-02 NOTE — PHYSICAL EXAM
[Normal Breath Sounds] : Normal breath sounds [Normal Heart Sounds] : normal heart sounds [Alert] : alert [Oriented to Person] : oriented to person [Oriented to Place] : oriented to place [Oriented to Time] : oriented to time [Calm] : calm [de-identified] : WNL [de-identified] : WNL [de-identified] : NYL [de-identified] : WNL [de-identified] : WNL

## 2024-10-02 NOTE — PHYSICAL EXAM
[Normal Breath Sounds] : Normal breath sounds [Normal Heart Sounds] : normal heart sounds [Alert] : alert [Oriented to Person] : oriented to person [Oriented to Place] : oriented to place [Oriented to Time] : oriented to time [Calm] : calm [de-identified] : WNL [de-identified] : WNL [de-identified] : NYL [de-identified] : WNL [de-identified] : WNL

## 2024-10-02 NOTE — PHYSICAL EXAM
[Normal Breath Sounds] : Normal breath sounds [Normal Heart Sounds] : normal heart sounds [Alert] : alert [Oriented to Person] : oriented to person [Oriented to Place] : oriented to place [Oriented to Time] : oriented to time [Calm] : calm [de-identified] : WNL [de-identified] : WNL [de-identified] : NYL [de-identified] : WNL [de-identified] : WNL

## 2024-10-02 NOTE — HISTORY OF PRESENT ILLNESS
[FreeTextEntry1] : Karthikeyan is a 64 y/o male being seen for consultation, possible abscess  s/p Incision and drainage of recurrent ischiorectal abscess, seton placement for transsphincteric fistula 3/25/21  s/p anal fistulotomy and suture ligation of hemorrhoids on 6/11/21   s/p Laparoscopic cholecystectomy on 11/01/2021  Last seen 12/2021 - Has healed from the surgery. Path discussed - polyp with low grade dysplasia, margins clear. Ongoing fatigue and nausea of unclear etiology - does not appear to be d/t a surgical issue - I'll check CBC and a CMP and a RUQ US. Could the symptoms be d/t adrenal insufficiency?? Instructed to reach out to his Endocrinologist. All questions answered. RTO as needed - will call with test results.  Colonoscopy 10/27/22 - Rectum to 4 mm sessile polyp excised.  At 20 cm from the anal verge 33 mm sessile polyp excised.  Internal hemorrhoids.  Colon polyps as above 7 in total 1 polyp at 40 cm descending colon removed with snare the rest totally excised with biopsy forceps in the rectum mostly and at 70 cm from the anal verge.    Today pt reports feeling intermittent left buttock swelling and discomfort since 2020 that does not drain.  Soft BMs daily, no straining, no bleeding.  Denies prolapsing tissue or swelling.  No episodes of incontinence of stool or flatus.  Good appetite.  No c/o nausea/vomiting.  Denies fever and chills.  Not on anticoagulants.

## 2024-10-03 PROBLEM — L72.9 CYST OF BUTTOCKS: Status: ACTIVE | Noted: 2024-10-03

## 2024-10-17 ENCOUNTER — OUTPATIENT (OUTPATIENT)
Dept: OUTPATIENT SERVICES | Facility: HOSPITAL | Age: 64
LOS: 1 days | End: 2024-10-17
Payer: COMMERCIAL

## 2024-10-17 VITALS
OXYGEN SATURATION: 97 % | SYSTOLIC BLOOD PRESSURE: 143 MMHG | HEART RATE: 79 BPM | WEIGHT: 270.95 LBS | TEMPERATURE: 99 F | HEIGHT: 70 IN | DIASTOLIC BLOOD PRESSURE: 72 MMHG | RESPIRATION RATE: 14 BRPM

## 2024-10-17 DIAGNOSIS — L72.9 FOLLICULAR CYST OF THE SKIN AND SUBCUTANEOUS TISSUE, UNSPECIFIED: ICD-10-CM

## 2024-10-17 DIAGNOSIS — K60.3 ANAL FISTULA: Chronic | ICD-10-CM

## 2024-10-17 DIAGNOSIS — Z90.49 ACQUIRED ABSENCE OF OTHER SPECIFIED PARTS OF DIGESTIVE TRACT: Chronic | ICD-10-CM

## 2024-10-17 DIAGNOSIS — Z98.890 OTHER SPECIFIED POSTPROCEDURAL STATES: Chronic | ICD-10-CM

## 2024-10-17 DIAGNOSIS — E10.9 TYPE 1 DIABETES MELLITUS WITHOUT COMPLICATIONS: ICD-10-CM

## 2024-10-17 DIAGNOSIS — Z90.79 ACQUIRED ABSENCE OF OTHER GENITAL ORGAN(S): Chronic | ICD-10-CM

## 2024-10-17 DIAGNOSIS — J45.909 UNSPECIFIED ASTHMA, UNCOMPLICATED: ICD-10-CM

## 2024-10-17 DIAGNOSIS — G47.33 OBSTRUCTIVE SLEEP APNEA (ADULT) (PEDIATRIC): ICD-10-CM

## 2024-10-17 LAB
ALBUMIN SERPL ELPH-MCNC: 4.1 G/DL — SIGNIFICANT CHANGE UP (ref 3.3–5)
ALP SERPL-CCNC: 143 U/L — HIGH (ref 40–120)
ALT FLD-CCNC: 26 U/L — SIGNIFICANT CHANGE UP (ref 10–45)
ANION GAP SERPL CALC-SCNC: 10 MMOL/L — SIGNIFICANT CHANGE UP (ref 5–17)
AST SERPL-CCNC: 23 U/L — SIGNIFICANT CHANGE UP (ref 10–40)
BILIRUB SERPL-MCNC: 0.2 MG/DL — SIGNIFICANT CHANGE UP (ref 0.2–1.2)
BUN SERPL-MCNC: 16 MG/DL — SIGNIFICANT CHANGE UP (ref 7–23)
CALCIUM SERPL-MCNC: 9 MG/DL — SIGNIFICANT CHANGE UP (ref 8.4–10.5)
CHLORIDE SERPL-SCNC: 105 MMOL/L — SIGNIFICANT CHANGE UP (ref 96–108)
CO2 SERPL-SCNC: 23 MMOL/L — SIGNIFICANT CHANGE UP (ref 22–31)
CREAT SERPL-MCNC: 1.35 MG/DL — HIGH (ref 0.5–1.3)
EGFR: 59 ML/MIN/1.73M2 — LOW
GLUCOSE SERPL-MCNC: 122 MG/DL — HIGH (ref 70–99)
HCT VFR BLD CALC: 40.9 % — SIGNIFICANT CHANGE UP (ref 39–50)
HGB BLD-MCNC: 12.9 G/DL — LOW (ref 13–17)
MCHC RBC-ENTMCNC: 25.5 PG — LOW (ref 27–34)
MCHC RBC-ENTMCNC: 31.5 GM/DL — LOW (ref 32–36)
MCV RBC AUTO: 80.8 FL — SIGNIFICANT CHANGE UP (ref 80–100)
NRBC # BLD: 0 /100 WBCS — SIGNIFICANT CHANGE UP (ref 0–0)
PLATELET # BLD AUTO: 297 K/UL — SIGNIFICANT CHANGE UP (ref 150–400)
POTASSIUM SERPL-MCNC: 3.9 MMOL/L — SIGNIFICANT CHANGE UP (ref 3.5–5.3)
POTASSIUM SERPL-SCNC: 3.9 MMOL/L — SIGNIFICANT CHANGE UP (ref 3.5–5.3)
PROT SERPL-MCNC: 6.8 G/DL — SIGNIFICANT CHANGE UP (ref 6–8.3)
RBC # BLD: 5.06 M/UL — SIGNIFICANT CHANGE UP (ref 4.2–5.8)
RBC # FLD: 14.9 % — HIGH (ref 10.3–14.5)
SODIUM SERPL-SCNC: 138 MMOL/L — SIGNIFICANT CHANGE UP (ref 135–145)
WBC # BLD: 7.17 K/UL — SIGNIFICANT CHANGE UP (ref 3.8–10.5)
WBC # FLD AUTO: 7.17 K/UL — SIGNIFICANT CHANGE UP (ref 3.8–10.5)

## 2024-10-17 PROCEDURE — 85027 COMPLETE CBC AUTOMATED: CPT

## 2024-10-17 PROCEDURE — G0463: CPT

## 2024-10-17 PROCEDURE — 80053 COMPREHEN METABOLIC PANEL: CPT

## 2024-10-17 PROCEDURE — 83036 HEMOGLOBIN GLYCOSYLATED A1C: CPT

## 2024-10-17 RX ORDER — SODIUM CHLORIDE 9 MG/ML
3 INJECTION, SOLUTION INTRAMUSCULAR; INTRAVENOUS; SUBCUTANEOUS EVERY 8 HOURS
Refills: 0 | Status: DISCONTINUED | OUTPATIENT
Start: 2024-10-29 | End: 2024-11-12

## 2024-10-17 RX ORDER — LIDOCAINE HCL 60 MG/3 ML
0.2 SYRINGE (ML) INJECTION ONCE
Refills: 0 | Status: DISCONTINUED | OUTPATIENT
Start: 2024-10-29 | End: 2024-11-12

## 2024-10-17 NOTE — H&P PST ADULT - HISTORY OF PRESENT ILLNESS
64 year old male with PMH of Type 1 Diabetes (on Insulin pump), HTN, HLD, Asthma (daily Dulera inhaler BID, states he very rarely uses Albuterol rescue inhaler, few times a year), CHERELLE on CPAP, GERD, CKD, Anal fissure s/p fistulotomy 2021, I&D of recurrent rectal abscess 2021. Patient reports feeling intermittent left buttock swelling and discomfort since 2020 that does not drain. He presents today to PST prior to scheduled Excision of Cyst Left Buttock on 10/29/24. Patient denies recent fever, chills, chest pain, SOB, palpitations, or recent exposure to COVID-19.    As per Dr. Abad (via Teams), patient may keep his Continuous Glucose Monitor and Insulin Pump on throughout his procedure. She states that patients are sometimes advised by endocrinologist to decrease their basal rate; patient is advised to discuss with his endocrinologist Dr. Arredondo regarding any necessary adjustments that need to be made to his insulin pump. Insulin pump attestation filled out by patient.

## 2024-10-17 NOTE — H&P PST ADULT - PROBLEM SELECTOR PLAN 1
Excision of Cyst Left Buttock on 10/29/24.  CBC, CMP, and HemA1c done today at [facility].  Pre op instructions provided and all questions answered.

## 2024-10-17 NOTE — H&P PST ADULT - NSICDXPASTSURGICALHX_GEN_ALL_CORE_FT
PAST SURGICAL HISTORY:  Anal fistula 3/2021 abscess drained with seton placement and 5/2021    History of lumpectomy axilla 2005    History of orchiectomy 9/2020    History of ptosis repair right childhood congenital    S/P appendectomy age 6    S/P cholecystectomy

## 2024-10-17 NOTE — H&P PST ADULT - NSICDXPASTMEDICALHX_GEN_ALL_CORE_FT
PAST MEDICAL HISTORY:  Asthma current exacerbation, improved on Prednisone and Mucinex , followed by Pulm    Elevated LFTs     Gall bladder polyp     GERD (gastroesophageal reflux disease)     History of type 1 diabetes mellitus 1990's on insulin pump    HTN (hypertension)     Hyperlipidemia     Obesity     CHERELLE on CPAP

## 2024-10-17 NOTE — H&P PST ADULT - ASSESSMENT
Activity: Patient states he can walk 3-4 blocks and climb flight of stairs with no symptoms.     DASI: 7.25    Mallampati:3    Dental: Denies loose teeth or dentures.

## 2024-10-17 NOTE — H&P PST ADULT - PROBLEM SELECTOR PLAN 2
As per Dr. Abad (via Teams), patient may keep his Continuous Glucose Monitor and Insulin Pump on throughout his procedure. She states that patients are sometimes advised by endocrinologist to decrease their basal rate; patient is advised to discuss with his endocrinologist Dr. Arredondo regarding any necessary adjustments that need to be made to his insulin pump. Insulin pump attestation filled out by patient.

## 2024-10-18 LAB
A1C WITH ESTIMATED AVERAGE GLUCOSE RESULT: 6.2 % — HIGH (ref 4–5.6)
ESTIMATED AVERAGE GLUCOSE: 131 MG/DL — HIGH (ref 68–114)

## 2024-10-21 ENCOUNTER — APPOINTMENT (OUTPATIENT)
Dept: INTERNAL MEDICINE | Facility: CLINIC | Age: 64
End: 2024-10-21
Payer: COMMERCIAL

## 2024-10-21 ENCOUNTER — NON-APPOINTMENT (OUTPATIENT)
Age: 64
End: 2024-10-21

## 2024-10-21 VITALS
HEART RATE: 71 BPM | BODY MASS INDEX: 39.46 KG/M2 | OXYGEN SATURATION: 97 % | WEIGHT: 275 LBS | SYSTOLIC BLOOD PRESSURE: 140 MMHG | TEMPERATURE: 98.6 F | DIASTOLIC BLOOD PRESSURE: 80 MMHG

## 2024-10-21 VITALS — SYSTOLIC BLOOD PRESSURE: 135 MMHG | DIASTOLIC BLOOD PRESSURE: 80 MMHG

## 2024-10-21 DIAGNOSIS — Z12.9 ENCOUNTER FOR SCREENING FOR MALIGNANT NEOPLASM, SITE UNSPECIFIED: ICD-10-CM

## 2024-10-21 DIAGNOSIS — J45.909 UNSPECIFIED ASTHMA, UNCOMPLICATED: ICD-10-CM

## 2024-10-21 DIAGNOSIS — I10 ESSENTIAL (PRIMARY) HYPERTENSION: ICD-10-CM

## 2024-10-21 DIAGNOSIS — G47.33 OBSTRUCTIVE SLEEP APNEA (ADULT) (PEDIATRIC): ICD-10-CM

## 2024-10-21 DIAGNOSIS — E78.5 HYPERLIPIDEMIA, UNSPECIFIED: ICD-10-CM

## 2024-10-21 DIAGNOSIS — L72.9 FOLLICULAR CYST OF THE SKIN AND SUBCUTANEOUS TISSUE, UNSPECIFIED: ICD-10-CM

## 2024-10-21 DIAGNOSIS — R94.31 ABNORMAL ELECTROCARDIOGRAM [ECG] [EKG]: ICD-10-CM

## 2024-10-21 DIAGNOSIS — E66.9 OBESITY, UNSPECIFIED: ICD-10-CM

## 2024-10-21 DIAGNOSIS — R74.8 ABNORMAL LEVELS OF OTHER SERUM ENZYMES: ICD-10-CM

## 2024-10-21 DIAGNOSIS — R79.89 OTHER SPECIFIED ABNORMAL FINDINGS OF BLOOD CHEMISTRY: ICD-10-CM

## 2024-10-21 DIAGNOSIS — Z01.818 ENCOUNTER FOR OTHER PREPROCEDURAL EXAMINATION: ICD-10-CM

## 2024-10-21 DIAGNOSIS — E11.9 TYPE 2 DIABETES MELLITUS W/OUT COMPLICATIONS: ICD-10-CM

## 2024-10-21 PROCEDURE — 99214 OFFICE O/P EST MOD 30 MIN: CPT | Mod: 25

## 2024-10-21 PROCEDURE — 93000 ELECTROCARDIOGRAM COMPLETE: CPT

## 2024-10-21 PROCEDURE — 99386 PREV VISIT NEW AGE 40-64: CPT

## 2024-10-22 PROBLEM — E66.9 OBESITY (BMI 30-39.9): Status: ACTIVE | Noted: 2024-10-21

## 2024-10-22 PROBLEM — Z12.9 CANCER SCREENING: Status: ACTIVE | Noted: 2024-10-21

## 2024-10-22 PROBLEM — Z01.818 PREOP EXAM FOR INTERNAL MEDICINE: Status: ACTIVE | Noted: 2024-10-21

## 2024-10-22 LAB
25(OH)D3 SERPL-MCNC: 32.9 NG/ML
ALBUMIN SERPL ELPH-MCNC: 4.1 G/DL
ALP BLD-CCNC: 152 U/L
ALT SERPL-CCNC: 27 U/L
ANION GAP SERPL CALC-SCNC: 15 MMOL/L
APPEARANCE: CLEAR
AST SERPL-CCNC: 30 U/L
BACTERIA: NEGATIVE /HPF
BASOPHILS # BLD AUTO: 0.03 K/UL
BASOPHILS NFR BLD AUTO: 0.4 %
BILIRUB SERPL-MCNC: 0.2 MG/DL
BILIRUBIN URINE: NEGATIVE
BLOOD URINE: NEGATIVE
BUN SERPL-MCNC: 16 MG/DL
CALCIUM SERPL-MCNC: 9 MG/DL
CAST: 0 /LPF
CHLORIDE SERPL-SCNC: 107 MMOL/L
CHOLEST SERPL-MCNC: 190 MG/DL
CK SERPL-CCNC: 548 U/L
CO2 SERPL-SCNC: 21 MMOL/L
COLOR: YELLOW
CREAT SERPL-MCNC: 1.53 MG/DL
CREAT SPEC-SCNC: 135 MG/DL
EGFR: 51 ML/MIN/1.73M2
EOSINOPHIL # BLD AUTO: 0.23 K/UL
EOSINOPHIL NFR BLD AUTO: 3.3 %
EPITHELIAL CELLS: 0 /HPF
ESTIMATED AVERAGE GLUCOSE: 134 MG/DL
FERRITIN SERPL-MCNC: 60 NG/ML
FOLATE SERPL-MCNC: 10.1 NG/ML
GLUCOSE QUALITATIVE U: NEGATIVE MG/DL
GLUCOSE SERPL-MCNC: 77 MG/DL
HBA1C MFR BLD HPLC: 6.3 %
HCT VFR BLD CALC: 43.3 %
HDLC SERPL-MCNC: 46 MG/DL
HGB BLD-MCNC: 13.2 G/DL
IMM GRANULOCYTES NFR BLD AUTO: 0.4 %
IRON SATN MFR SERPL: 16 %
IRON SERPL-MCNC: 56 UG/DL
KETONES URINE: NEGATIVE MG/DL
LDLC SERPL CALC-MCNC: 128 MG/DL
LEUKOCYTE ESTERASE URINE: NEGATIVE
LYMPHOCYTES # BLD AUTO: 1.88 K/UL
LYMPHOCYTES NFR BLD AUTO: 26.6 %
MAN DIFF?: NORMAL
MCHC RBC-ENTMCNC: 25.5 PG
MCHC RBC-ENTMCNC: 30.5 GM/DL
MCV RBC AUTO: 83.8 FL
MICROALBUMIN 24H UR DL<=1MG/L-MCNC: 35.1 MG/DL
MICROALBUMIN/CREAT 24H UR-RTO: 259 MG/G
MICROSCOPIC-UA: NORMAL
MONOCYTES # BLD AUTO: 0.77 K/UL
MONOCYTES NFR BLD AUTO: 10.9 %
NEUTROPHILS # BLD AUTO: 4.12 K/UL
NEUTROPHILS NFR BLD AUTO: 58.4 %
NITRITE URINE: NEGATIVE
NONHDLC SERPL-MCNC: 144 MG/DL
PH URINE: 6
PLATELET # BLD AUTO: 263 K/UL
POTASSIUM SERPL-SCNC: 4.4 MMOL/L
PROT SERPL-MCNC: 6.5 G/DL
PROTEIN URINE: 100 MG/DL
PSA SERPL-MCNC: 3.37 NG/ML
RBC # BLD: 5.17 M/UL
RBC # FLD: 15.2 %
RED BLOOD CELLS URINE: 1 /HPF
SODIUM SERPL-SCNC: 143 MMOL/L
SPECIFIC GRAVITY URINE: 1.02
T4 FREE SERPL-MCNC: 1.1 NG/DL
TIBC SERPL-MCNC: 358 UG/DL
TRIGL SERPL-MCNC: 88 MG/DL
TSH SERPL-ACNC: 1.66 UIU/ML
UIBC SERPL-MCNC: 302 UG/DL
URATE SERPL-MCNC: 4.7 MG/DL
UROBILINOGEN URINE: 0.2 MG/DL
VIT B12 SERPL-MCNC: 307 PG/ML
WBC # FLD AUTO: 7.06 K/UL
WHITE BLOOD CELLS URINE: 0 /HPF

## 2024-10-29 ENCOUNTER — TRANSCRIPTION ENCOUNTER (OUTPATIENT)
Age: 64
End: 2024-10-29

## 2024-10-29 ENCOUNTER — OUTPATIENT (OUTPATIENT)
Dept: OUTPATIENT SERVICES | Facility: HOSPITAL | Age: 64
LOS: 1 days | End: 2024-10-29
Payer: COMMERCIAL

## 2024-10-29 ENCOUNTER — RESULT REVIEW (OUTPATIENT)
Age: 64
End: 2024-10-29

## 2024-10-29 ENCOUNTER — APPOINTMENT (OUTPATIENT)
Dept: SURGERY | Facility: HOSPITAL | Age: 64
End: 2024-10-29

## 2024-10-29 VITALS
SYSTOLIC BLOOD PRESSURE: 130 MMHG | DIASTOLIC BLOOD PRESSURE: 75 MMHG | WEIGHT: 270.95 LBS | HEART RATE: 74 BPM | TEMPERATURE: 98 F | OXYGEN SATURATION: 97 % | HEIGHT: 70 IN | RESPIRATION RATE: 16 BRPM

## 2024-10-29 VITALS
SYSTOLIC BLOOD PRESSURE: 128 MMHG | RESPIRATION RATE: 10 BRPM | OXYGEN SATURATION: 95 % | DIASTOLIC BLOOD PRESSURE: 80 MMHG | HEART RATE: 66 BPM

## 2024-10-29 DIAGNOSIS — Z98.890 OTHER SPECIFIED POSTPROCEDURAL STATES: Chronic | ICD-10-CM

## 2024-10-29 DIAGNOSIS — Z90.49 ACQUIRED ABSENCE OF OTHER SPECIFIED PARTS OF DIGESTIVE TRACT: Chronic | ICD-10-CM

## 2024-10-29 DIAGNOSIS — Z90.79 ACQUIRED ABSENCE OF OTHER GENITAL ORGAN(S): Chronic | ICD-10-CM

## 2024-10-29 DIAGNOSIS — K60.3 ANAL FISTULA: Chronic | ICD-10-CM

## 2024-10-29 DIAGNOSIS — L72.9 FOLLICULAR CYST OF THE SKIN AND SUBCUTANEOUS TISSUE, UNSPECIFIED: ICD-10-CM

## 2024-10-29 LAB — GLUCOSE BLDC GLUCOMTR-MCNC: 144 MG/DL — HIGH (ref 70–99)

## 2024-10-29 PROCEDURE — 88304 TISSUE EXAM BY PATHOLOGIST: CPT | Mod: 26

## 2024-10-29 PROCEDURE — 11404 EXC TR-EXT B9+MARG 3.1-4 CM: CPT

## 2024-10-29 PROCEDURE — 82962 GLUCOSE BLOOD TEST: CPT

## 2024-10-29 PROCEDURE — 88304 TISSUE EXAM BY PATHOLOGIST: CPT

## 2024-10-29 PROCEDURE — 11406 EXC TR-EXT B9+MARG >4.0 CM: CPT

## 2024-10-29 RX ORDER — OXYCODONE HYDROCHLORIDE 30 MG/1
1 TABLET ORAL
Qty: 7 | Refills: 0
Start: 2024-10-29

## 2024-10-29 RX ORDER — ONDANSETRON HYDROCHLORIDE 2 MG/ML
4 INJECTION, SOLUTION INTRAMUSCULAR; INTRAVENOUS ONCE
Refills: 0 | Status: DISCONTINUED | OUTPATIENT
Start: 2024-10-29 | End: 2024-11-12

## 2024-10-29 RX ORDER — OXYCODONE HYDROCHLORIDE 30 MG/1
5 TABLET ORAL ONCE
Refills: 0 | Status: DISCONTINUED | OUTPATIENT
Start: 2024-10-29 | End: 2024-10-29

## 2024-10-29 RX ORDER — ESOMEPRAZOLE SODIUM 40 MG/5ML
1 INJECTION INTRAVENOUS
Refills: 0 | DISCHARGE

## 2024-10-29 RX ORDER — AMLODIPINE BESYLATE 5 MG
1 TABLET ORAL
Refills: 0 | DISCHARGE

## 2024-10-29 RX ORDER — HYDROMORPHONE HCL/0.9% NACL/PF 6 MG/30 ML
0.5 PATIENT CONTROLLED ANALGESIA SYRINGE INTRAVENOUS
Refills: 0 | Status: DISCONTINUED | OUTPATIENT
Start: 2024-10-29 | End: 2024-10-29

## 2024-10-29 RX ADMIN — Medication 100 MILLILITER(S): at 09:22

## 2024-10-29 NOTE — ASU DISCHARGE PLAN (ADULT/PEDIATRIC) - NS MD DC FALL RISK RISK
For information on Fall & Injury Prevention, visit: [email or web address] OR  [email or web address] OR  https://www.cdc.gov/steadi/patient.html

## 2024-10-29 NOTE — ASU DISCHARGE PLAN (ADULT/PEDIATRIC) - FINANCIAL ASSISTANCE
[facility] provides services at a reduced cost to those who are determined to be eligible through [facility]’s financial assistance program. Information regarding [facility]’s financial assistance program can be found by going to [email or web address] or by calling [phone].

## 2024-10-29 NOTE — ASU DISCHARGE PLAN (ADULT/PEDIATRIC) - CARE PROVIDER_API CALL
Rose Abad  Colon/Rectal Surgery  [address]  [place] [address]  Phone: [phone]  Fax: [phone]  Follow Up Time: 2 weeks

## 2024-10-29 NOTE — ASU PREOP CHECKLIST - ALLERGY BAND ON
"Subjective:       Patient ID: Mario Bateman is a 38 y.o. male.    Vitals:  height is 5' 8.5" (1.74 m) and weight is 66.2 kg (146 lb). His oral temperature is 98.5 °F (36.9 °C). His blood pressure is 135/88 and his pulse is 84. His respiration is 20 and oxygen saturation is 98%.     Chief Complaint: Penile Discharge (Patient reports penile discharge that started yesterday.)    Penile discharge onset yesterday, describes discharge as yellow. Hx of gonorrhea.     Penile Discharge  The patient's primary symptoms include penile discharge. The patient's pertinent negatives include no genital itching, genital lesions, penile pain or testicular pain. This is a new problem. The current episode started yesterday. The penile discharge was Yellow. He is sexually active. He never uses condoms. No, his partner does not have an STD. His past medical history is significant for gonorrhea.       Genitourinary:  Positive for penile discharge. Negative for penile pain and testicular pain.       Objective:      Physical Exam   Constitutional: He is oriented to person, place, and time. He is cooperative. He does not appear ill. awake  HENT:   Head: Normocephalic and atraumatic.   Cardiovascular: Normal rate, regular rhythm, S1 normal, S2 normal and normal heart sounds.   Pulmonary/Chest: Effort normal.   Abdominal: Normal appearance and bowel sounds are normal. Soft. flat abdomen There is no abdominal tenderness.   Genitourinary:         Comments: Deferred      Neurological: He is alert and oriented to person, place, and time. GCS eye subscore is 4. GCS verbal subscore is 5. GCS motor subscore is 6.   Skin: Skin is warm, dry and intact. Capillary refill takes less than 2 seconds.   Psychiatric: His behavior is normal.   Nursing note and vitals reviewed.        Assessment:       1. Abnormal penile discharge    2. Screen for STD (sexually transmitted disease)          Plan:     STD tests are pending, staff will contact you with any " positive findings. Will prophylactic treat for Gonorrhea with Rocephin, avoid any sexual intercourse until results are resulted.      Abnormal penile discharge  -     Chlamydia/GC, PCR  -     T.vaginalisisc, Amplified RNA    Screen for STD (sexually transmitted disease)    Other orders  -     cefTRIAXone injection 0.5 g  -     LIDOcaine HCL 10 mg/ml (1%) injection 1 mL              done

## 2024-10-29 NOTE — ASU PATIENT PROFILE, ADULT - FALL HARM RISK - UNIVERSAL INTERVENTIONS
Bed in lowest position, wheels locked, appropriate side rails in place/Call bell, personal items and telephone in reach/Instruct patient to call for assistance before getting out of bed or chair/Non-slip footwear when patient is out of bed/Walls to call system/Physically safe environment - no spills, clutter or unnecessary equipment/Purposeful Proactive Rounding/Room/bathroom lighting operational, light cord in reach

## 2024-10-29 NOTE — ASU PATIENT PROFILE, ADULT - TEACHING/LEARNING DEVELOPMENTAL CONSIDERATIONS
Joint Injections: Care Instructions  Your Care Instructions  Joint injections are shots into a joint, such as the knee. They may be used to put in medicines, such as pain relievers. Or they can be used to take out fluid. Sometimes the fluid is tested in a lab. This can help find the cause of a joint problem. A corticosteroid, or steroid, shot is used to reduce inflammation in tendons or joints. It is often used to treat problems such as arthritis, tendinitis, and bursitis. Steroids can be injected directly into a painful, inflamed joint. They can also help reduce inflammation of a bursa. A bursa is a sac of fluid. It cushions and lubricates areas where tendons, ligaments, skin, muscles, or bones rub against each other. A steroid shot can sometimes help with short-term pain relief when other treatments haven't worked. If steroid shots help, pain may improve for weeks or months. Follow-up care is a key part of your treatment and safety. Be sure to make and go to all appointments, and call your doctor if you are having problems. It's also a good idea to know your test results and keep a list of the medicines you take. How can you care for yourself at home? · Put ice or a cold pack on the area for 10 to 20 minutes at a time. Put a thin cloth between the ice and your skin. · Take anti-inflammatory medicines to reduce pain, swelling, or inflammation. These include ibuprofen (Advil, Motrin) and naproxen (Aleve). Read and follow all instructions on the label. · Avoid strenuous activities for several days, especially those that put stress on the area where you got the shot. · If you have dressings over the area, keep them clean and dry. You may remove them when your doctor tells you to. When should you call for help? Call your doctor now or seek immediate medical care if:  · You have signs of infection, such as:  ¨ Increased pain, swelling, warmth, or redness. ¨ Red streaks leading from the site.   ¨ Pus draining from the site. ¨ A fever. Watch closely for changes in your health, and be sure to contact your doctor if you have any problems. Where can you learn more? Go to http://eddie-geovanni.info/. Enter N616 in the search box to learn more about \"Joint Injections: Care Instructions. \"  Current as of: May 23, 2016  Content Version: 11.2  © 3974-6948 [employer]. Care instructions adapted under license by [employer] (which disclaims liability or warranty for this information). If you have questions about a medical condition or this instruction, always ask your healthcare professional. [address] any warranty or liability for your use of this information. none

## 2024-10-29 NOTE — ASU DISCHARGE PLAN (ADULT/PEDIATRIC) - ACTIVITY LEVEL
Anesthesia Pre Eval Note    Anesthesia ROS/Med Hx          Pulmonary Review:    Positive for sleep apnea     Neuro/Psych Review:       Positive for psychiatric history    Cardiovascular Review:     Positive for CHF  Positive for pulmonary hypertension  Positive for CAD  Positive for hypertension  Positive for hyperlipidemia    GI/HEPATIC/RENAL Review:    Positive for liver disease     End/Other Review:    Positive for hypothyroidism  Positive for anemia  Positive for cancer  Additional Results:      ALLERGIES:   -- Contrast Media -- Other (See Comments)    --  Unknown   -- Iodine   (Environmental Or Med) -- Other (See Comments)   -- Penicillins -- HIVES    --  ANESTHESIA REVIEWED 7/16/24-NO BL ABX   Last Labs        Component                Value               Date/Time                  WBC                      5.3                 08/26/2024 0904            RBC                      2.64 (L)            08/26/2024 0904            HGB                      7.8 (L)             08/26/2024 0904            HCT                      26.1 (L)            08/26/2024 0904            MCV                      98.9                08/26/2024 0904            MCH                      29.5                08/26/2024 0904            MCHC                     29.9 (L)            08/26/2024 0904            RDW-CV                   24.2 (H)            08/26/2024 0904            Sodium                   137                 08/26/2024 0904            Potassium                4.1                 08/26/2024 0904            Chloride                 107                 08/26/2024 0904            Carbon Dioxide           23                  08/26/2024 0904            Glucose                  205 (H)             08/26/2024 0904            BUN                      14                  08/26/2024 0904            Creatinine               0.81                08/26/2024 0904            Glomerular Filtrati*     73                  08/26/2024 0904             Calcium                  8.3 (L)             08/26/2024 0904            PLT                      323                 08/26/2024 0904            PTT                      28                  08/06/2024 1843            INR                      1.2                 08/06/2024 1843        Past Medical History:  No date: Adrenal adenoma      Comment:  benign, left  No date: AF (atrial fibrillation)  (CMD)  No date: Anemia  No date: Arthralgia  No date: Breast cancer  (CMD)  No date: CAD (coronary artery disease)  No date: Cyst of pancreas (CMD)  No date: Depression  No date: Encounter for management of vacuum-assisted closure (VAC) of   wound  No date: Essential (primary) hypertension  No date: Hepatic steatosis  No date: High cholesterol  08/08/2022: History of COVID-19  No date: Liver lesion  No date: Lung nodule  No date: Lymphedema      Comment:  right arm  No date: Osteopenia  No date: Osteoporosis  No date: Pancreas cyst (CMD)  No date: Pericardial effusion (CMD)  No date: Sleep apnea  No date: Thyroid disorder  No date: Urinary incontinence  No date: Vitamin D deficiency  Past Surgical History:  No date: Appendectomy  12/19/2017: Breast lumpectomy; Right  No date: Breast surgery; Right      Comment:  radical mastectomy  No date: Colonoscopy diagnostic      Comment:  2024  No date: Eye surgery; Left      Comment:  cataracts  No date: Knee surgery; Right  No date: Pericardial window  No date: Remv 2nd cataract,corn-scler sectn; Left      Comment:  12/2026  No date: Tonsillectomy   Prior to Admission medications :  Medication FeroSul 325 (65 Fe) MG tablet, Sig Take 1 tablet by mouth in the morning and 1 tablet in the evening., Start Date 8/22/24, End Date , Taking? Yes, Authorizing Provider Provider, Outside    Medication doxycycline hyclate (VIBRAMYCIN) 100 MG capsule, Sig Take 100 mg by mouth in the morning and 100 mg in the evening., Start Date 8/19/24, End Date 9/2/24, Taking? Yes, Authorizing Provider Provider,  Outside    Medication furosemide (LASIX) 40 MG tablet, Sig Take 40 mg by mouth daily., Start Date 8/22/24, End Date , Taking? Yes, Authorizing Provider Provider, Outside    Medication losartan (COZAAR) 100 MG tablet, Sig Take 100 mg by mouth daily., Start Date 8/16/24, End Date , Taking? Yes, Authorizing Provider System, Provider Not In    Medication hydrALAZINE (APRESOLINE) 25 MG tablet, Sig Take 25 mg by mouth in the morning and 25 mg at noon and 25 mg in the evening., Start Date 8/16/24, End Date , Taking? Yes, Authorizing Provider System, Provider Not In    Medication levothyroxine 75 MCG tablet, Sig Take 1 tablet by mouth daily (before breakfast)., Start Date 8/15/24, End Date 9/14/24, Taking? Yes, Authorizing Provider Edgar Rangel MD    Medication metoPROLOL succinate (TOPROL-XL) 25 MG 24 hr tablet, Sig Take 1 tablet by mouth daily., Start Date 8/11/24, End Date 9/10/24, Taking? Yes, Authorizing Provider Billy Schumacher MD    Medication docusate sodium-sennosides (SENOKOT S) 50-8.6 MG per tablet, Sig Take 2 tablets by mouth 2 times daily as needed for Constipation., Start Date 7/25/24, End Date , Taking? Yes, Authorizing Provider Chino Delcid MD    Medication sildenafil (REVATIO) 20 MG tablet, Sig Take 20 mg by mouth in the morning and 20 mg at noon and 20 mg in the evening., Start Date , End Date , Taking? Yes, Authorizing Provider Provider, Outside    Medication budesonide-formoterol (SYMBICORT) 160-4.5 MCG/ACT inhaler, Sig Inhale 2 puffs into the lungs 2 times daily as needed (shortness of breath)., Start Date , End Date , Taking? Yes, Authorizing Provider Provider, Outside    Medication ketorolac (ACULAR) 0.5 % ophthalmic solution, Sig Place 1 drop into left eye in the morning and 1 drop in the evening., Start Date , End Date , Taking? Yes, Authorizing Provider Provider, Outside    Medication prednisoLONE acetate (PRED FORTE) 1 % ophthalmic suspension, Sig Place 1 drop into left eye in the  morning and 1 drop at noon and 1 drop in the evening., Start Date , End Date , Taking? Yes, Authorizing Provider Provider, Outside    Medication apixaBAN (Eliquis) 5 MG Tab, Sig Take 5 mg by mouth in the morning and 5 mg in the evening. Indications: Blockage of Blood Vessel to Lung by a Particle., Start Date , End Date , Taking? Yes, Authorizing Provider Provider, Outside    Medication atorvastatin (LIPITOR) 20 MG tablet, Sig Take 20 mg by mouth daily. Indications: High Amount of Triglycerides in the Blood, Ischemic Heart Disease, Start Date , End Date , Taking? Yes, Authorizing Provider Provider, Outside    Medication exemestane (AROMASIN) 25 MG tablet, Sig Take 1 tablet by mouth daily. Indications: Cancer of the Breast, Start Date , End Date , Taking? Yes, Authorizing Provider Provider, Outside    Medication Multiple Vitamins-Minerals (PRESERVISION AREDS PO), Sig Take 1 capsule by mouth in the morning and 1 capsule in the evening., Start Date , End Date , Taking? Yes, Authorizing Provider Provider, Outside    Medication Vitamin D, Ergocalciferol, 95902 units Cap, Sig Take 1 capsule by mouth 1 day a week. Wednesdays, Start Date , End Date , Taking? Yes, Authorizing Provider Provider, Outside    Medication metroNIDAZOLE (FLAGYL) 500 MG tablet, Sig Take 1 tablet by mouth in the morning and 1 tablet in the evening., Start Date 8/19/24, End Date 9/2/24, Taking? , Authorizing Provider Provider, Outside    Medication guaiFENesin-DM (MUCINEX DM)  MG per 12 hr tablet, Sig Take 1 tablet by mouth every 12 hours.  Patient not taking: Reported on 8/16/2024, Start Date 8/14/24, End Date , Taking? , Authorizing Provider Edgar Rangel MD    Medication silver sulfADIAZINE (THERMAZENE) 1 % cream, Sig Apply topically 2 times daily. Apply to a thickness of 1/16 inch (\"nickel thick\").  Patient not taking: Reported on 8/16/2024, Start Date 8/14/24, End Date , Taking? , Authorizing Provider Edgar Rangel MD    Medication  traMADol (ULTRAM) 50 MG tablet, Sig Take 1 tablet by mouth every 8 hours as needed for Pain.  Patient not taking: Reported on 8/16/2024, Start Date 8/14/24, End Date , Taking? , Authorizing Provider Edgar Rangel MD    Medication verapamil (CALAN SR) 180 MG CR tablet, Sig Take 1 tablet by mouth every 12 hours.  Patient not taking: Reported on 8/26/2024, Start Date 8/10/24, End Date 9/9/24, Taking? , Authorizing Provider Billy Schumacher MD    Medication empagliflozin (JARDIANCE) 10 MG tablet, Sig Take 1 tablet by mouth daily.  Patient not taking: Reported on 8/16/2024, Start Date 8/9/24, End Date 9/8/24, Taking? , Authorizing Provider Billy Schumacher MD    Medication acetaminophen (TYLENOL) 650 MG CR tablet, Sig Take 650 mg by mouth every 8 hours as needed for Pain. Indications: Pain, mild, Start Date , End Date , Taking? , Authorizing Provider Provider, Outside    Medication alendronate (FOSAMAX) 70 MG tablet, Sig Take 70 mg by mouth 1 day a week. Sundays  Indications: Osteoporosis, Start Date , End Date , Taking? , Authorizing Provider Provider, Outside         Patient Vitals for the past 24 hrs:   BP Temp Temp src Pulse Resp SpO2 Height Weight   08/27/24 1329 100/62 36.8 °C (98.2 °F) Oral (!) 108 16 99 % 5' 4\" (1.626 m) 90.7 kg (200 lb)       Social history reviewed:  Social History     Tobacco Use   Smoking Status Former   Smokeless Tobacco Never   Tobacco Comments    Quit 30 years ago         E-Cigarette/Vaping Substances & Devices    E-Cigarette/Vaping Use Never Used     Nicotine No     THC No     CBD No     Flavoring No     Disposable No     Pre-filled or Refillable Cartridge No     Refillable Tank No     Pre-filled Pod No        Social History     Substance and Sexual Activity   Alcohol Use Yes    Comment: socially           Relevant Problems   Anesthesia Problems   (+) KIMBERLEY on CPAP       Physical Exam     Airway   Mallampati: II  TM Distance: >3 FB  Neck ROM: Full  Neck: Non-tender and Able to place  in sniff position  TMJ Mobility: Good    Cardiovascular  Cardiovascular exam normal  Cardio Rhythm: Regular  Cardio Rate: Normal    Head Assessment  Head assessment: Normocephalic and Atraumatic    General Assessment  General Assessment: Alert and oriented and No acute distress    Dental Exam  Dental exam normal  Patient has:  Poor Dentition    Pulmonary Exam  Pulmonary exam normal  Breath sounds clear to auscultation:  Yes    Abdominal Exam  Abdominal exam normal      Anesthesia Plan:    ASA Status: 3  Anesthesia Type: General    Induction: Intravenous  Preferred Airway Type: LMA  Maintenance: Inhalational    Post-op Pain Management: Per Surgeon      Checklist  Reviewed: NPO Status, Allergies, Medications, Problem list and Past Med History  Consent/Risks Discussed Statement:  The proposed anesthetic plan, including its risks and benefits, have been discussed with the Patient along with the risks and benefits of alternatives. Questions were encouraged and answered and the patient and/or representative understands and agrees to proceed.    I have discussed elements of the patient's history or examination, as noted above and/or as follows, that place the patient at higher risk of complications; BMI> 30 (obesity).    I discussed with the patient (and/or patient's legal representative) the risks and benefits of the proposed anesthesia plan, General, which may include services performed by other anesthesia providers.    Alternative anesthesia plans, if available, were reviewed with the patient (and/or patient's legal representative). Discussion has been held with the patient (and/or patient's legal representative) regarding risks of anesthesia, which include vomiting, nausea, dental injury, hypotension, aspiration, sore throat, bleeding/hematoma, organ damage and oral injury and emergent situations that may require change in anesthesia plan.    The patient (and/or patient's legal representative) has indicated  understanding, his/her questions have been answered, and he/she wishes to proceed with the planned anesthetic.    Blood Products: Not Anticipated     No exercise

## 2024-10-29 NOTE — ASU DISCHARGE PLAN (ADULT/PEDIATRIC) - NURSING INSTRUCTIONS
OK to take Tylenol/Acetaminophen at 6PM TODAY (last dose @  12PM   in operating room)  for pain and every 6 hours after as needed.

## 2024-10-29 NOTE — BRIEF OPERATIVE NOTE - OPERATION/FINDINGS
Under anesthesia, excision of buttock cyst   Monocryl and vicryl sutures used to run deep dermal and running subcuticular sutures

## 2024-10-31 LAB — SURGICAL PATHOLOGY STUDY: SIGNIFICANT CHANGE UP

## 2024-11-05 NOTE — ED ADULT NURSE REASSESSMENT NOTE - GENERAL PATIENT STATE
Francis arrived to the clinic with mother for food challenge for the subsequent dose of milk.    His initial desensitization to milk was on 10/24/24. Francis has EpiPen Jr. 0.15mg/0.15mL from the clinic as they forgot theirs.    Antihistamine was dosed at home prior to today's dose advancement.       Vitals signs obtained and stable. Francis's mother confirmed that previous dose of milk solution B = 1 mg/mL, 6 mL/6 mg milk protein was taken twice daily since last visit without any missed doses or adverse reactions noted.    Per written order from Dr. Rueda, the next dose of milk solution B = 1 mg/mL, 8 mL/8 mg milk protein was given. Vitals signs checked 30 minutes following first dose and were stable. Francis waited in the waiting room an additional 30 minutes. Vital signs remained stable and no adverse reactions were noted.     Dr. Rueda updated and in to see patient. Writer informed Francis and his mother to continue to dose milk solution B = 1 mg/mL, 8 mL/8 mg milk protein PO BID until next visit. They verbalized understanding and was then discharged to home without adverse reaction noted.         resting/sleeping/comfortable appearance/cooperative/improvement verbalized/family/SO at bedside

## 2024-11-06 ENCOUNTER — APPOINTMENT (OUTPATIENT)
Dept: UROLOGY | Facility: CLINIC | Age: 64
End: 2024-11-06
Payer: COMMERCIAL

## 2024-11-06 VITALS
DIASTOLIC BLOOD PRESSURE: 82 MMHG | HEIGHT: 70 IN | RESPIRATION RATE: 16 BRPM | OXYGEN SATURATION: 96 % | WEIGHT: 270 LBS | BODY MASS INDEX: 38.65 KG/M2 | HEART RATE: 90 BPM | SYSTOLIC BLOOD PRESSURE: 146 MMHG

## 2024-11-06 DIAGNOSIS — R97.20 ELEVATED PROSTATE, SPECIFIC ANTIGEN [PSA]: ICD-10-CM

## 2024-11-06 DIAGNOSIS — R35.1 NOCTURIA: ICD-10-CM

## 2024-11-06 DIAGNOSIS — R79.89 OTHER SPECIFIED ABNORMAL FINDINGS OF BLOOD CHEMISTRY: ICD-10-CM

## 2024-11-06 DIAGNOSIS — N40.1 BENIGN PROSTATIC HYPERPLASIA WITH LOWER URINARY TRACT SYMPMS: ICD-10-CM

## 2024-11-06 DIAGNOSIS — N13.8 BENIGN PROSTATIC HYPERPLASIA WITH LOWER URINARY TRACT SYMPMS: ICD-10-CM

## 2024-11-06 PROCEDURE — 99204 OFFICE O/P NEW MOD 45 MIN: CPT | Mod: 25

## 2024-11-06 PROCEDURE — 51741 ELECTRO-UROFLOWMETRY FIRST: CPT

## 2024-11-11 ENCOUNTER — APPOINTMENT (OUTPATIENT)
Dept: SURGERY | Facility: CLINIC | Age: 64
End: 2024-11-11

## 2024-11-11 VITALS
SYSTOLIC BLOOD PRESSURE: 167 MMHG | BODY MASS INDEX: 38.65 KG/M2 | HEART RATE: 77 BPM | DIASTOLIC BLOOD PRESSURE: 77 MMHG | OXYGEN SATURATION: 97 % | TEMPERATURE: 98 F | WEIGHT: 270 LBS | HEIGHT: 70 IN

## 2024-11-11 PROCEDURE — 99024 POSTOP FOLLOW-UP VISIT: CPT

## 2024-11-20 ENCOUNTER — APPOINTMENT (OUTPATIENT)
Dept: SURGERY | Facility: CLINIC | Age: 64
End: 2024-11-20

## 2024-11-20 VITALS
RESPIRATION RATE: 17 BRPM | HEART RATE: 80 BPM | SYSTOLIC BLOOD PRESSURE: 158 MMHG | DIASTOLIC BLOOD PRESSURE: 83 MMHG | TEMPERATURE: 97.6 F | OXYGEN SATURATION: 98 %

## 2024-11-20 PROCEDURE — 99024 POSTOP FOLLOW-UP VISIT: CPT

## 2024-11-20 RX ORDER — CEPHALEXIN 500 MG/1
500 TABLET ORAL
Qty: 14 | Refills: 0 | Status: ACTIVE | COMMUNITY
Start: 2024-11-20 | End: 1900-01-01

## 2024-11-27 ENCOUNTER — NON-APPOINTMENT (OUTPATIENT)
Age: 64
End: 2024-11-27

## 2024-12-04 ENCOUNTER — APPOINTMENT (OUTPATIENT)
Dept: SURGERY | Facility: CLINIC | Age: 64
End: 2024-12-04

## 2024-12-04 VITALS
TEMPERATURE: 96.6 F | SYSTOLIC BLOOD PRESSURE: 151 MMHG | HEART RATE: 77 BPM | RESPIRATION RATE: 17 BRPM | DIASTOLIC BLOOD PRESSURE: 84 MMHG

## 2024-12-04 PROCEDURE — 99024 POSTOP FOLLOW-UP VISIT: CPT

## 2024-12-18 ENCOUNTER — APPOINTMENT (OUTPATIENT)
Dept: SURGERY | Facility: CLINIC | Age: 64
End: 2024-12-18
Payer: COMMERCIAL

## 2024-12-18 VITALS
HEIGHT: 70 IN | SYSTOLIC BLOOD PRESSURE: 153 MMHG | DIASTOLIC BLOOD PRESSURE: 79 MMHG | WEIGHT: 270 LBS | RESPIRATION RATE: 16 BRPM | BODY MASS INDEX: 38.65 KG/M2 | OXYGEN SATURATION: 100 % | HEART RATE: 87 BPM | TEMPERATURE: 97.3 F

## 2024-12-18 DIAGNOSIS — Z09 ENCOUNTER FOR FOLLOW-UP EXAMINATION AFTER COMPLETED TREATMENT FOR CONDITIONS OTHER THAN MALIGNANT NEOPLASM: ICD-10-CM

## 2024-12-18 PROCEDURE — 99212 OFFICE O/P EST SF 10 MIN: CPT

## 2024-12-18 RX ORDER — FINERENONE 20 MG/1
TABLET, FILM COATED ORAL
Refills: 0 | Status: ACTIVE | COMMUNITY

## 2025-01-08 ENCOUNTER — APPOINTMENT (OUTPATIENT)
Dept: SURGERY | Facility: CLINIC | Age: 65
End: 2025-01-08

## 2025-01-18 NOTE — REASON FOR VISIT
[Post Op: _________] : a [unfilled] post op visit [FreeTextEntry1] : Laparoscopic cholecystectomy on 11/01/2021 Assistance with ambulation/Assistance OOB with selected safe patient handling equipment/Communicate Risk of Fall with Harm to all staff/Monitor gait and stability/Provide patient with walking aids - walker, cane, crutches/Reinforce activity limits and safety measures with patient and family/Tailored Fall Risk Interventions/Visual Cue: Yellow wristband and red socks/Bed in lowest position, wheels locked, appropriate side rails in place/Call bell, personal items and telephone in reach/Instruct patient to call for assistance before getting out of bed or chair/Non-slip footwear when patient is out of bed/[place] to call system/Physically safe environment - no spills, clutter or unnecessary equipment/Purposeful Proactive Rounding/Room/bathroom lighting operational, light cord in reach

## 2025-01-28 ENCOUNTER — NON-APPOINTMENT (OUTPATIENT)
Age: 65
End: 2025-01-28

## 2025-01-28 ENCOUNTER — APPOINTMENT (OUTPATIENT)
Dept: INTERNAL MEDICINE | Facility: CLINIC | Age: 65
End: 2025-01-28

## 2025-01-28 VITALS
DIASTOLIC BLOOD PRESSURE: 68 MMHG | HEIGHT: 70 IN | WEIGHT: 269 LBS | HEART RATE: 89 BPM | OXYGEN SATURATION: 99 % | BODY MASS INDEX: 38.51 KG/M2 | TEMPERATURE: 97.5 F | SYSTOLIC BLOOD PRESSURE: 140 MMHG

## 2025-01-28 VITALS — DIASTOLIC BLOOD PRESSURE: 78 MMHG | SYSTOLIC BLOOD PRESSURE: 147 MMHG

## 2025-01-28 DIAGNOSIS — Z12.9 ENCOUNTER FOR SCREENING FOR MALIGNANT NEOPLASM, SITE UNSPECIFIED: ICD-10-CM

## 2025-01-28 DIAGNOSIS — N13.8 BENIGN PROSTATIC HYPERPLASIA WITH LOWER URINARY TRACT SYMPMS: ICD-10-CM

## 2025-01-28 DIAGNOSIS — G47.33 OBSTRUCTIVE SLEEP APNEA (ADULT) (PEDIATRIC): ICD-10-CM

## 2025-01-28 DIAGNOSIS — N40.1 BENIGN PROSTATIC HYPERPLASIA WITH LOWER URINARY TRACT SYMPMS: ICD-10-CM

## 2025-01-28 DIAGNOSIS — E78.5 HYPERLIPIDEMIA, UNSPECIFIED: ICD-10-CM

## 2025-01-28 DIAGNOSIS — J45.909 UNSPECIFIED ASTHMA, UNCOMPLICATED: ICD-10-CM

## 2025-01-28 DIAGNOSIS — E66.9 OBESITY, UNSPECIFIED: ICD-10-CM

## 2025-01-28 PROBLEM — K06.9 GUM DISEASE: Status: ACTIVE | Noted: 2025-01-28

## 2025-01-28 PROCEDURE — G2211 COMPLEX E/M VISIT ADD ON: CPT | Mod: NC

## 2025-01-28 PROCEDURE — 93000 ELECTROCARDIOGRAM COMPLETE: CPT

## 2025-01-28 PROCEDURE — 99214 OFFICE O/P EST MOD 30 MIN: CPT

## 2025-01-29 LAB
ALBUMIN SERPL ELPH-MCNC: 4 G/DL
ALP BLD-CCNC: 165 U/L
ALT SERPL-CCNC: 29 U/L
ANION GAP SERPL CALC-SCNC: 13 MMOL/L
AST SERPL-CCNC: 30 U/L
BASOPHILS # BLD AUTO: 0.03 K/UL
BASOPHILS NFR BLD AUTO: 0.4 %
BILIRUB SERPL-MCNC: <0.2 MG/DL
BUN SERPL-MCNC: 14 MG/DL
CALCIUM SERPL-MCNC: 8.8 MG/DL
CHLORIDE SERPL-SCNC: 105 MMOL/L
CHOLEST SERPL-MCNC: 200 MG/DL
CK SERPL-CCNC: 625 U/L
CO2 SERPL-SCNC: 21 MMOL/L
CREAT SERPL-MCNC: 1.41 MG/DL
EGFR: 56 ML/MIN/1.73M2
EOSINOPHIL # BLD AUTO: 0.29 K/UL
EOSINOPHIL NFR BLD AUTO: 3.7 %
ESTIMATED AVERAGE GLUCOSE: 131 MG/DL
GLUCOSE SERPL-MCNC: 180 MG/DL
HBA1C MFR BLD HPLC: 6.2 %
HCT VFR BLD CALC: 41.2 %
HDLC SERPL-MCNC: 44 MG/DL
HGB BLD-MCNC: 13 G/DL
IMM GRANULOCYTES NFR BLD AUTO: 0.1 %
LDLC SERPL CALC-MCNC: 102 MG/DL
LYMPHOCYTES # BLD AUTO: 2.17 K/UL
LYMPHOCYTES NFR BLD AUTO: 28 %
MAN DIFF?: NORMAL
MCHC RBC-ENTMCNC: 25.7 PG
MCHC RBC-ENTMCNC: 31.6 G/DL
MCV RBC AUTO: 81.6 FL
MONOCYTES # BLD AUTO: 0.85 K/UL
MONOCYTES NFR BLD AUTO: 11 %
NEUTROPHILS # BLD AUTO: 4.39 K/UL
NEUTROPHILS NFR BLD AUTO: 56.8 %
NONHDLC SERPL-MCNC: 156 MG/DL
PLATELET # BLD AUTO: 295 K/UL
POTASSIUM SERPL-SCNC: 4.3 MMOL/L
PROT SERPL-MCNC: 6.3 G/DL
PSA FREE FLD-MCNC: 14 %
PSA FREE SERPL-MCNC: 0.39 NG/ML
PSA SERPL-MCNC: 2.7 NG/ML
RBC # BLD: 5.05 M/UL
RBC # FLD: 15.6 %
SODIUM SERPL-SCNC: 139 MMOL/L
T4 FREE SERPL-MCNC: 1.1 NG/DL
TRIGL SERPL-MCNC: 320 MG/DL
TSH SERPL-ACNC: 1.43 UIU/ML
WBC # FLD AUTO: 7.74 K/UL

## 2025-02-04 ENCOUNTER — APPOINTMENT (OUTPATIENT)
Dept: INTERNAL MEDICINE | Facility: CLINIC | Age: 65
End: 2025-02-04

## 2025-02-04 VITALS
TEMPERATURE: 97.7 F | DIASTOLIC BLOOD PRESSURE: 80 MMHG | HEIGHT: 70 IN | HEART RATE: 94 BPM | OXYGEN SATURATION: 97 % | WEIGHT: 275 LBS | SYSTOLIC BLOOD PRESSURE: 140 MMHG | BODY MASS INDEX: 39.37 KG/M2

## 2025-02-04 DIAGNOSIS — Z86.39 PERSONAL HISTORY OF OTHER ENDOCRINE, NUTRITIONAL AND METABOLIC DISEASE: ICD-10-CM

## 2025-02-04 DIAGNOSIS — R79.89 OTHER SPECIFIED ABNORMAL FINDINGS OF BLOOD CHEMISTRY: ICD-10-CM

## 2025-02-04 DIAGNOSIS — R94.31 ABNORMAL ELECTROCARDIOGRAM [ECG] [EKG]: ICD-10-CM

## 2025-02-04 DIAGNOSIS — E10.9 TYPE 1 DIABETES MELLITUS W/OUT COMPLICATIONS: ICD-10-CM

## 2025-02-04 PROCEDURE — G2211 COMPLEX E/M VISIT ADD ON: CPT | Mod: NC

## 2025-02-04 PROCEDURE — 99214 OFFICE O/P EST MOD 30 MIN: CPT

## 2025-02-04 RX ORDER — LABETALOL HYDROCHLORIDE 100 MG/1
100 TABLET, FILM COATED ORAL TWICE DAILY
Qty: 270 | Refills: 0 | Status: ACTIVE | COMMUNITY
Start: 2025-01-28 | End: 1900-01-01

## 2025-02-05 ENCOUNTER — OUTPATIENT (OUTPATIENT)
Dept: OUTPATIENT SERVICES | Facility: HOSPITAL | Age: 65
LOS: 1 days | End: 2025-02-05
Payer: COMMERCIAL

## 2025-02-05 ENCOUNTER — APPOINTMENT (OUTPATIENT)
Dept: ULTRASOUND IMAGING | Facility: CLINIC | Age: 65
End: 2025-02-05
Payer: COMMERCIAL

## 2025-02-05 DIAGNOSIS — R79.89 OTHER SPECIFIED ABNORMAL FINDINGS OF BLOOD CHEMISTRY: ICD-10-CM

## 2025-02-05 DIAGNOSIS — K60.3 ANAL FISTULA: Chronic | ICD-10-CM

## 2025-02-05 DIAGNOSIS — Z90.49 ACQUIRED ABSENCE OF OTHER SPECIFIED PARTS OF DIGESTIVE TRACT: Chronic | ICD-10-CM

## 2025-02-05 DIAGNOSIS — Z90.79 ACQUIRED ABSENCE OF OTHER GENITAL ORGAN(S): Chronic | ICD-10-CM

## 2025-02-05 DIAGNOSIS — Z98.890 OTHER SPECIFIED POSTPROCEDURAL STATES: Chronic | ICD-10-CM

## 2025-02-05 PROCEDURE — 76770 US EXAM ABDO BACK WALL COMP: CPT | Mod: 26

## 2025-02-05 PROCEDURE — 76770 US EXAM ABDO BACK WALL COMP: CPT

## 2025-02-07 ENCOUNTER — APPOINTMENT (OUTPATIENT)
Dept: UROLOGY | Facility: CLINIC | Age: 65
End: 2025-02-07

## 2025-02-07 VITALS
BODY MASS INDEX: 39.37 KG/M2 | DIASTOLIC BLOOD PRESSURE: 79 MMHG | HEIGHT: 70 IN | WEIGHT: 275 LBS | SYSTOLIC BLOOD PRESSURE: 142 MMHG

## 2025-02-07 PROCEDURE — 99214 OFFICE O/P EST MOD 30 MIN: CPT

## 2025-02-10 ENCOUNTER — TRANSCRIPTION ENCOUNTER (OUTPATIENT)
Age: 65
End: 2025-02-10

## 2025-02-22 PROBLEM — N28.1 RENAL CYST: Status: ACTIVE | Noted: 2025-02-22

## 2025-03-04 NOTE — PATIENT PROFILE ADULT - DEAF OR HARD OF HEARING?
Spoke with guardian to inform her she can ask her pcp for a referral for a developmental pediatrician. Also gave her the info for [facility] and [facility] developmental offices.    no

## 2025-03-22 NOTE — H&P PST ADULT - HEIGHT IN CM
Please take medications as prescribed, have been sent to [facility] on [facility].  ---Of note, your cholesterol medication was changed as Lipitor interacts with Pradaxa, but Crestor does not. Both cholesterol medications are in the same group and you had a dose of Crestor last night without issues.    Please follow-up with PCP, Cardiology, and Gen Surg.    You make call GI and make any appointment if you continue to have GI issues.    I have also put in an External Request for Neurology to call him for an appointment.         [phone]

## 2025-06-05 NOTE — ED STATDOCS - NS_ATTENDINGSCRIBE_ED_ALL_ED
Please offer tomorrow Friday 6/6 at 12:40 pm - [place]. Appt blocked. Thanks.   I personally performed the service described in the documentation recorded by the scribe in my presence, and it accurately and completely records my words and actions.

## 2025-06-06 ENCOUNTER — APPOINTMENT (OUTPATIENT)
Dept: INTERNAL MEDICINE | Facility: CLINIC | Age: 65
End: 2025-06-06

## 2025-07-31 NOTE — ED PROVIDER NOTE - CONSULTANT FREE TEXT FOR MDM DISCUSSED CASE WITH QUESTION
Addended by: DOREEN ORTIZ on: 7/31/2025 01:17 PM     Modules accepted: Orders    
discussed with dr alvarado, urology who will see pt today

## 2025-08-08 ENCOUNTER — TRANSCRIPTION ENCOUNTER (OUTPATIENT)
Age: 65
End: 2025-08-08

## 2025-08-11 ENCOUNTER — INPATIENT (INPATIENT)
Facility: HOSPITAL | Age: 65
LOS: 2 days | Discharge: HOME | DRG: 287 | End: 2025-08-14
Attending: INTERNAL MEDICINE | Admitting: INTERNAL MEDICINE
Payer: COMMERCIAL

## 2025-08-11 VITALS
RESPIRATION RATE: 18 BRPM | SYSTOLIC BLOOD PRESSURE: 137 MMHG | HEART RATE: 118 BPM | TEMPERATURE: 98 F | OXYGEN SATURATION: 97 % | DIASTOLIC BLOOD PRESSURE: 64 MMHG

## 2025-08-11 DIAGNOSIS — Z98.890 OTHER SPECIFIED POSTPROCEDURAL STATES: Chronic | ICD-10-CM

## 2025-08-11 DIAGNOSIS — K60.3 ANAL FISTULA: Chronic | ICD-10-CM

## 2025-08-11 DIAGNOSIS — Z90.49 ACQUIRED ABSENCE OF OTHER SPECIFIED PARTS OF DIGESTIVE TRACT: Chronic | ICD-10-CM

## 2025-08-11 DIAGNOSIS — Z90.79 ACQUIRED ABSENCE OF OTHER GENITAL ORGAN(S): Chronic | ICD-10-CM

## 2025-08-11 LAB
ALBUMIN SERPL ELPH-MCNC: 3.1 G/DL — LOW (ref 3.3–5)
ALP SERPL-CCNC: 162 U/L — HIGH (ref 40–120)
ALT FLD-CCNC: 26 U/L — SIGNIFICANT CHANGE UP (ref 12–78)
ANION GAP SERPL CALC-SCNC: 6 MMOL/L — SIGNIFICANT CHANGE UP (ref 5–17)
APTT BLD: 30.9 SEC — SIGNIFICANT CHANGE UP (ref 26.1–36.8)
AST SERPL-CCNC: 20 U/L — SIGNIFICANT CHANGE UP (ref 15–37)
BASOPHILS # BLD AUTO: 0.03 K/UL — SIGNIFICANT CHANGE UP (ref 0–0.2)
BASOPHILS NFR BLD AUTO: 0.3 % — SIGNIFICANT CHANGE UP (ref 0–2)
BILIRUB SERPL-MCNC: 0.2 MG/DL — SIGNIFICANT CHANGE UP (ref 0.2–1.2)
BUN SERPL-MCNC: 22 MG/DL — SIGNIFICANT CHANGE UP (ref 7–23)
CALCIUM SERPL-MCNC: 8.8 MG/DL — SIGNIFICANT CHANGE UP (ref 8.5–10.1)
CHLORIDE SERPL-SCNC: 105 MMOL/L — SIGNIFICANT CHANGE UP (ref 96–108)
CO2 SERPL-SCNC: 27 MMOL/L — SIGNIFICANT CHANGE UP (ref 22–31)
CREAT SERPL-MCNC: 1.71 MG/DL — HIGH (ref 0.5–1.3)
EGFR: 45 ML/MIN/1.73M2 — LOW
EGFR: 45 ML/MIN/1.73M2 — LOW
EOSINOPHIL # BLD AUTO: 0.22 K/UL — SIGNIFICANT CHANGE UP (ref 0–0.5)
EOSINOPHIL NFR BLD AUTO: 2.3 % — SIGNIFICANT CHANGE UP (ref 0–6)
GLUCOSE BLDC GLUCOMTR-MCNC: 317 MG/DL — HIGH (ref 70–99)
GLUCOSE SERPL-MCNC: 305 MG/DL — HIGH (ref 70–99)
HCT VFR BLD CALC: 41.2 % — SIGNIFICANT CHANGE UP (ref 39–50)
HGB BLD-MCNC: 12.9 G/DL — LOW (ref 13–17)
IMM GRANULOCYTES # BLD AUTO: 0.03 K/UL — SIGNIFICANT CHANGE UP (ref 0–0.07)
IMM GRANULOCYTES NFR BLD AUTO: 0.3 % — SIGNIFICANT CHANGE UP (ref 0–0.9)
INR BLD: 1.02 RATIO — SIGNIFICANT CHANGE UP (ref 0.85–1.16)
LYMPHOCYTES # BLD AUTO: 2.19 K/UL — SIGNIFICANT CHANGE UP (ref 1–3.3)
LYMPHOCYTES NFR BLD AUTO: 22.7 % — SIGNIFICANT CHANGE UP (ref 13–44)
MAGNESIUM SERPL-MCNC: 2.1 MG/DL — SIGNIFICANT CHANGE UP (ref 1.6–2.6)
MCHC RBC-ENTMCNC: 25.6 PG — LOW (ref 27–34)
MCHC RBC-ENTMCNC: 31.3 G/DL — LOW (ref 32–36)
MCV RBC AUTO: 81.9 FL — SIGNIFICANT CHANGE UP (ref 80–100)
MONOCYTES # BLD AUTO: 1.04 K/UL — HIGH (ref 0–0.9)
MONOCYTES NFR BLD AUTO: 10.8 % — SIGNIFICANT CHANGE UP (ref 2–14)
NEUTROPHILS # BLD AUTO: 6.14 K/UL — SIGNIFICANT CHANGE UP (ref 1.8–7.4)
NEUTROPHILS NFR BLD AUTO: 63.6 % — SIGNIFICANT CHANGE UP (ref 43–77)
NRBC # BLD AUTO: 0 K/UL — SIGNIFICANT CHANGE UP (ref 0–0)
NRBC # FLD: 0 K/UL — SIGNIFICANT CHANGE UP (ref 0–0)
NRBC BLD AUTO-RTO: 0 /100 WBCS — SIGNIFICANT CHANGE UP (ref 0–0)
PLATELET # BLD AUTO: 262 K/UL — SIGNIFICANT CHANGE UP (ref 150–400)
PMV BLD: 10.6 FL — SIGNIFICANT CHANGE UP (ref 7–13)
POTASSIUM SERPL-MCNC: 4.1 MMOL/L — SIGNIFICANT CHANGE UP (ref 3.5–5.3)
POTASSIUM SERPL-SCNC: 4.1 MMOL/L — SIGNIFICANT CHANGE UP (ref 3.5–5.3)
PROT SERPL-MCNC: 6.4 GM/DL — SIGNIFICANT CHANGE UP (ref 6–8.3)
PROTHROM AB SERPL-ACNC: 11.7 SEC — SIGNIFICANT CHANGE UP (ref 9.9–13.4)
RBC # BLD: 5.03 M/UL — SIGNIFICANT CHANGE UP (ref 4.2–5.8)
RBC # FLD: 15 % — HIGH (ref 10.3–14.5)
SODIUM SERPL-SCNC: 138 MMOL/L — SIGNIFICANT CHANGE UP (ref 135–145)
TROPONIN I, HIGH SENSITIVITY RESULT: 73.62 NG/L — SIGNIFICANT CHANGE UP
TSH SERPL-MCNC: 2.08 UU/ML — SIGNIFICANT CHANGE UP (ref 0.34–4.82)
WBC # BLD: 9.65 K/UL — SIGNIFICANT CHANGE UP (ref 3.8–10.5)
WBC # FLD AUTO: 9.65 K/UL — SIGNIFICANT CHANGE UP (ref 3.8–10.5)

## 2025-08-11 PROCEDURE — 99291 CRITICAL CARE FIRST HOUR: CPT

## 2025-08-11 PROCEDURE — 93010 ELECTROCARDIOGRAM REPORT: CPT

## 2025-08-11 PROCEDURE — 71045 X-RAY EXAM CHEST 1 VIEW: CPT | Mod: 26

## 2025-08-11 RX ORDER — INSULIN LISPRO 100 U/ML
8 INJECTION, SOLUTION INTRAVENOUS; SUBCUTANEOUS ONCE
Refills: 0 | Status: COMPLETED | OUTPATIENT
Start: 2025-08-11 | End: 2025-08-11

## 2025-08-12 ENCOUNTER — RESULT REVIEW (OUTPATIENT)
Age: 65
End: 2025-08-12

## 2025-08-12 DIAGNOSIS — R07.9 CHEST PAIN, UNSPECIFIED: ICD-10-CM

## 2025-08-12 DIAGNOSIS — I10 ESSENTIAL (PRIMARY) HYPERTENSION: ICD-10-CM

## 2025-08-12 DIAGNOSIS — K21.9 GASTRO-ESOPHAGEAL REFLUX DISEASE WITHOUT ESOPHAGITIS: ICD-10-CM

## 2025-08-12 DIAGNOSIS — I26.99 OTHER PULMONARY EMBOLISM WITHOUT ACUTE COR PULMONALE: ICD-10-CM

## 2025-08-12 DIAGNOSIS — I47.29 OTHER VENTRICULAR TACHYCARDIA: ICD-10-CM

## 2025-08-12 DIAGNOSIS — R79.89 OTHER SPECIFIED ABNORMAL FINDINGS OF BLOOD CHEMISTRY: ICD-10-CM

## 2025-08-12 LAB
ANION GAP SERPL CALC-SCNC: 5 MMOL/L — SIGNIFICANT CHANGE UP (ref 5–17)
BUN SERPL-MCNC: 23 MG/DL — SIGNIFICANT CHANGE UP (ref 7–23)
CALCIUM SERPL-MCNC: 9.3 MG/DL — SIGNIFICANT CHANGE UP (ref 8.5–10.1)
CHLORIDE SERPL-SCNC: 110 MMOL/L — HIGH (ref 96–108)
CHOLEST SERPL-MCNC: 191 MG/DL — SIGNIFICANT CHANGE UP
CO2 SERPL-SCNC: 25 MMOL/L — SIGNIFICANT CHANGE UP (ref 22–31)
CREAT SERPL-MCNC: 1.5 MG/DL — HIGH (ref 0.5–1.3)
D DIMER BLD IA.RAPID-MCNC: <150 NG/ML DDU — SIGNIFICANT CHANGE UP
EGFR: 52 ML/MIN/1.73M2 — LOW
EGFR: 52 ML/MIN/1.73M2 — LOW
GLUCOSE SERPL-MCNC: 77 MG/DL — SIGNIFICANT CHANGE UP (ref 70–99)
HCT VFR BLD CALC: 41.4 % — SIGNIFICANT CHANGE UP (ref 39–50)
HDLC SERPL-MCNC: 39 MG/DL — LOW
HGB BLD-MCNC: 13.2 G/DL — SIGNIFICANT CHANGE UP (ref 13–17)
LDLC SERPL-MCNC: 123 MG/DL — HIGH
LIPID PNL WITH DIRECT LDL SERPL: 123 MG/DL — HIGH
MCHC RBC-ENTMCNC: 25.8 PG — LOW (ref 27–34)
MCHC RBC-ENTMCNC: 31.9 G/DL — LOW (ref 32–36)
MCV RBC AUTO: 81 FL — SIGNIFICANT CHANGE UP (ref 80–100)
NONHDLC SERPL-MCNC: 152 MG/DL — HIGH
NRBC # BLD AUTO: 0 K/UL — SIGNIFICANT CHANGE UP (ref 0–0)
NRBC # FLD: 0 K/UL — SIGNIFICANT CHANGE UP (ref 0–0)
NRBC BLD AUTO-RTO: 0 /100 WBCS — SIGNIFICANT CHANGE UP (ref 0–0)
PLATELET # BLD AUTO: 267 K/UL — SIGNIFICANT CHANGE UP (ref 150–400)
PMV BLD: 11 FL — SIGNIFICANT CHANGE UP (ref 7–13)
POTASSIUM SERPL-MCNC: 3.4 MMOL/L — LOW (ref 3.5–5.3)
POTASSIUM SERPL-SCNC: 3.4 MMOL/L — LOW (ref 3.5–5.3)
RBC # BLD: 5.11 M/UL — SIGNIFICANT CHANGE UP (ref 4.2–5.8)
RBC # FLD: 14.9 % — HIGH (ref 10.3–14.5)
SODIUM SERPL-SCNC: 140 MMOL/L — SIGNIFICANT CHANGE UP (ref 135–145)
TRIGL SERPL-MCNC: 162 MG/DL — HIGH
TROPONIN I, HIGH SENSITIVITY RESULT: 103.66 NG/L — HIGH
TROPONIN I, HIGH SENSITIVITY RESULT: 94.56 NG/L — HIGH
WBC # BLD: 8.13 K/UL — SIGNIFICANT CHANGE UP (ref 3.8–10.5)
WBC # FLD AUTO: 8.13 K/UL — SIGNIFICANT CHANGE UP (ref 3.8–10.5)

## 2025-08-12 PROCEDURE — 85379 FIBRIN DEGRADATION QUANT: CPT

## 2025-08-12 PROCEDURE — 93306 TTE W/DOPPLER COMPLETE: CPT | Mod: 26

## 2025-08-12 PROCEDURE — C1894: CPT

## 2025-08-12 PROCEDURE — 12345: CPT | Mod: NC

## 2025-08-12 PROCEDURE — 99223 1ST HOSP IP/OBS HIGH 75: CPT

## 2025-08-12 PROCEDURE — 85027 COMPLETE CBC AUTOMATED: CPT

## 2025-08-12 PROCEDURE — 80048 BASIC METABOLIC PNL TOTAL CA: CPT

## 2025-08-12 PROCEDURE — C1769: CPT

## 2025-08-12 PROCEDURE — 94660 CPAP INITIATION&MGMT: CPT

## 2025-08-12 PROCEDURE — 84484 ASSAY OF TROPONIN QUANT: CPT

## 2025-08-12 PROCEDURE — C1887: CPT

## 2025-08-12 PROCEDURE — 94640 AIRWAY INHALATION TREATMENT: CPT

## 2025-08-12 PROCEDURE — 93005 ELECTROCARDIOGRAM TRACING: CPT

## 2025-08-12 PROCEDURE — 36415 COLL VENOUS BLD VENIPUNCTURE: CPT

## 2025-08-12 PROCEDURE — 83036 HEMOGLOBIN GLYCOSYLATED A1C: CPT

## 2025-08-12 PROCEDURE — 80061 LIPID PANEL: CPT

## 2025-08-12 PROCEDURE — 93458 L HRT ARTERY/VENTRICLE ANGIO: CPT

## 2025-08-12 RX ORDER — UBIDECARENONE 100 MG
1 CAPSULE ORAL
Refills: 0 | DISCHARGE

## 2025-08-12 RX ORDER — MAGNESIUM, ALUMINUM HYDROXIDE 200-200 MG
30 TABLET,CHEWABLE ORAL EVERY 4 HOURS
Refills: 0 | Status: DISCONTINUED | OUTPATIENT
Start: 2025-08-12 | End: 2025-08-14

## 2025-08-12 RX ORDER — PYRIDOXINE HCL (VITAMIN B6) 25 MG
1 TABLET ORAL
Refills: 0 | DISCHARGE

## 2025-08-12 RX ORDER — SODIUM CHLORIDE 9 G/1000ML
1000 INJECTION, SOLUTION INTRAVENOUS
Refills: 0 | Status: DISCONTINUED | OUTPATIENT
Start: 2025-08-12 | End: 2025-08-14

## 2025-08-12 RX ORDER — ACETAMINOPHEN 500 MG/5ML
650 LIQUID (ML) ORAL EVERY 6 HOURS
Refills: 0 | Status: DISCONTINUED | OUTPATIENT
Start: 2025-08-12 | End: 2025-08-14

## 2025-08-12 RX ORDER — FINERENONE 10 MG/1
1 TABLET, FILM COATED ORAL
Refills: 0 | DISCHARGE

## 2025-08-12 RX ORDER — INSULIN ASPART 100 [IU]/ML
0 INJECTION, SOLUTION INTRAVENOUS; SUBCUTANEOUS
Refills: 0 | DISCHARGE

## 2025-08-12 RX ORDER — ESOMEPRAZOLE MAGNESIUM 40 MG/1
1 CAPSULE, DELAYED RELEASE ORAL
Refills: 0 | DISCHARGE

## 2025-08-12 RX ORDER — INSULIN ASPART 100 [IU]/ML
1 INJECTION, SOLUTION INTRAVENOUS; SUBCUTANEOUS
Refills: 0 | Status: DISCONTINUED | OUTPATIENT
Start: 2025-08-12 | End: 2025-08-14

## 2025-08-12 RX ORDER — GLUCAGON 3 MG/1
1 POWDER NASAL ONCE
Refills: 0 | Status: DISCONTINUED | OUTPATIENT
Start: 2025-08-12 | End: 2025-08-14

## 2025-08-12 RX ORDER — ASPIRIN 325 MG
81 TABLET ORAL DAILY
Refills: 0 | Status: DISCONTINUED | OUTPATIENT
Start: 2025-08-12 | End: 2025-08-14

## 2025-08-12 RX ORDER — BUDESONIDE AND FORMOTEROL FUMARATE DIHYDRATE 80; 4.5 UG/1; UG/1
2 AEROSOL RESPIRATORY (INHALATION)
Refills: 0 | DISCHARGE

## 2025-08-12 RX ORDER — DEXTROSE 50 % IN WATER 50 %
12.5 SYRINGE (ML) INTRAVENOUS ONCE
Refills: 0 | Status: DISCONTINUED | OUTPATIENT
Start: 2025-08-12 | End: 2025-08-14

## 2025-08-12 RX ORDER — MELATONIN 5 MG
3 TABLET ORAL AT BEDTIME
Refills: 0 | Status: DISCONTINUED | OUTPATIENT
Start: 2025-08-12 | End: 2025-08-14

## 2025-08-12 RX ORDER — EZETIMIBE 10 MG/1
1 TABLET ORAL
Refills: 0 | DISCHARGE

## 2025-08-12 RX ORDER — EZETIMIBE 10 MG/1
10 TABLET ORAL DAILY
Refills: 0 | Status: DISCONTINUED | OUTPATIENT
Start: 2025-08-12 | End: 2025-08-14

## 2025-08-12 RX ORDER — DEXTROSE 50 % IN WATER 50 %
25 SYRINGE (ML) INTRAVENOUS ONCE
Refills: 0 | Status: DISCONTINUED | OUTPATIENT
Start: 2025-08-12 | End: 2025-08-14

## 2025-08-12 RX ORDER — ESOMEPRAZOLE MAGNESIUM 40 MG/1
0 CAPSULE, DELAYED RELEASE ORAL
Refills: 0 | DISCHARGE

## 2025-08-12 RX ORDER — LOSARTAN POTASSIUM 100 MG/1
1 TABLET, FILM COATED ORAL
Refills: 0 | DISCHARGE

## 2025-08-12 RX ORDER — CHLORTHALIDONE 25 MG/1
25 TABLET ORAL DAILY
Refills: 0 | Status: DISCONTINUED | OUTPATIENT
Start: 2025-08-12 | End: 2025-08-12

## 2025-08-12 RX ORDER — FINERENONE 10 MG/1
2 TABLET, FILM COATED ORAL
Refills: 0 | DISCHARGE

## 2025-08-12 RX ORDER — ONDANSETRON HCL/PF 4 MG/2 ML
4 VIAL (ML) INJECTION EVERY 8 HOURS
Refills: 0 | Status: DISCONTINUED | OUTPATIENT
Start: 2025-08-12 | End: 2025-08-14

## 2025-08-12 RX ORDER — ASPIRIN 325 MG
324 TABLET ORAL ONCE
Refills: 0 | Status: COMPLETED | OUTPATIENT
Start: 2025-08-12 | End: 2025-08-12

## 2025-08-12 RX ORDER — ATORVASTATIN CALCIUM 80 MG/1
40 TABLET, FILM COATED ORAL AT BEDTIME
Refills: 0 | Status: DISCONTINUED | OUTPATIENT
Start: 2025-08-12 | End: 2025-08-14

## 2025-08-12 RX ORDER — DEXTROSE 50 % IN WATER 50 %
15 SYRINGE (ML) INTRAVENOUS ONCE
Refills: 0 | Status: DISCONTINUED | OUTPATIENT
Start: 2025-08-12 | End: 2025-08-14

## 2025-08-12 RX ADMIN — Medication 40 MILLIEQUIVALENT(S): at 18:25

## 2025-08-12 RX ADMIN — Medication 1 DOSE(S): at 21:16

## 2025-08-12 RX ADMIN — Medication 83 MILLILITER(S): at 14:53

## 2025-08-12 RX ADMIN — Medication 324 MILLIGRAM(S): at 01:07

## 2025-08-12 RX ADMIN — Medication 1 DOSE(S): at 14:32

## 2025-08-12 RX ADMIN — CHLORTHALIDONE 25 MILLIGRAM(S): 25 TABLET ORAL at 11:22

## 2025-08-12 RX ADMIN — Medication 40 MILLIEQUIVALENT(S): at 14:51

## 2025-08-12 RX ADMIN — Medication 40 MILLIGRAM(S): at 22:10

## 2025-08-12 RX ADMIN — Medication 40 MILLIGRAM(S): at 07:45

## 2025-08-12 RX ADMIN — Medication 81 MILLIGRAM(S): at 14:53

## 2025-08-13 LAB
A1C WITH ESTIMATED AVERAGE GLUCOSE RESULT: 6.7 % — HIGH (ref 4–5.6)
ANION GAP SERPL CALC-SCNC: 6 MMOL/L — SIGNIFICANT CHANGE UP (ref 5–17)
BUN SERPL-MCNC: 19 MG/DL — SIGNIFICANT CHANGE UP (ref 7–23)
CALCIUM SERPL-MCNC: 8.6 MG/DL — SIGNIFICANT CHANGE UP (ref 8.5–10.1)
CHLORIDE SERPL-SCNC: 112 MMOL/L — HIGH (ref 96–108)
CO2 SERPL-SCNC: 20 MMOL/L — LOW (ref 22–31)
CREAT SERPL-MCNC: 1.49 MG/DL — HIGH (ref 0.5–1.3)
EGFR: 53 ML/MIN/1.73M2 — LOW
EGFR: 53 ML/MIN/1.73M2 — LOW
ESTIMATED AVERAGE GLUCOSE: 146 MG/DL — HIGH (ref 68–114)
GLUCOSE SERPL-MCNC: 235 MG/DL — HIGH (ref 70–99)
POTASSIUM SERPL-MCNC: 4.2 MMOL/L — SIGNIFICANT CHANGE UP (ref 3.5–5.3)
POTASSIUM SERPL-SCNC: 4.2 MMOL/L — SIGNIFICANT CHANGE UP (ref 3.5–5.3)
SODIUM SERPL-SCNC: 138 MMOL/L — SIGNIFICANT CHANGE UP (ref 135–145)

## 2025-08-13 PROCEDURE — 93010 ELECTROCARDIOGRAM REPORT: CPT

## 2025-08-13 PROCEDURE — 99233 SBSQ HOSP IP/OBS HIGH 50: CPT

## 2025-08-13 PROCEDURE — 93458 L HRT ARTERY/VENTRICLE ANGIO: CPT | Mod: 26

## 2025-08-13 PROCEDURE — 99152 MOD SED SAME PHYS/QHP 5/>YRS: CPT

## 2025-08-13 RX ORDER — METOPROLOL SUCCINATE 50 MG/1
12.5 TABLET, EXTENDED RELEASE ORAL DAILY
Refills: 0 | Status: DISCONTINUED | OUTPATIENT
Start: 2025-08-13 | End: 2025-08-14

## 2025-08-13 RX ADMIN — Medication 250 MILLILITER(S): at 16:02

## 2025-08-13 RX ADMIN — Medication 1 DOSE(S): at 20:19

## 2025-08-13 RX ADMIN — Medication 1 DOSE(S): at 07:20

## 2025-08-13 RX ADMIN — Medication 40 MILLIGRAM(S): at 21:47

## 2025-08-13 RX ADMIN — Medication 81 MILLIGRAM(S): at 09:32

## 2025-08-13 RX ADMIN — Medication 40 MILLIGRAM(S): at 09:32

## 2025-08-13 RX ADMIN — Medication 250 MILLILITER(S): at 16:07

## 2025-08-13 RX ADMIN — EZETIMIBE 10 MILLIGRAM(S): 10 TABLET ORAL at 09:32

## 2025-08-13 RX ADMIN — Medication 250 MILLILITER(S): at 12:42

## 2025-08-14 ENCOUNTER — TRANSCRIPTION ENCOUNTER (OUTPATIENT)
Age: 65
End: 2025-08-14

## 2025-08-14 VITALS
TEMPERATURE: 98 F | DIASTOLIC BLOOD PRESSURE: 66 MMHG | SYSTOLIC BLOOD PRESSURE: 134 MMHG | OXYGEN SATURATION: 95 % | RESPIRATION RATE: 18 BRPM | HEART RATE: 86 BPM

## 2025-08-14 LAB
ANION GAP SERPL CALC-SCNC: 6 MMOL/L — SIGNIFICANT CHANGE UP (ref 5–17)
BUN SERPL-MCNC: 15 MG/DL — SIGNIFICANT CHANGE UP (ref 7–23)
CALCIUM SERPL-MCNC: 8.4 MG/DL — LOW (ref 8.5–10.1)
CHLORIDE SERPL-SCNC: 110 MMOL/L — HIGH (ref 96–108)
CO2 SERPL-SCNC: 23 MMOL/L — SIGNIFICANT CHANGE UP (ref 22–31)
CREAT SERPL-MCNC: 1.4 MG/DL — HIGH (ref 0.5–1.3)
EGFR: 57 ML/MIN/1.73M2 — LOW
EGFR: 57 ML/MIN/1.73M2 — LOW
GLUCOSE SERPL-MCNC: 107 MG/DL — HIGH (ref 70–99)
POTASSIUM SERPL-MCNC: 3.5 MMOL/L — SIGNIFICANT CHANGE UP (ref 3.5–5.3)
POTASSIUM SERPL-SCNC: 3.5 MMOL/L — SIGNIFICANT CHANGE UP (ref 3.5–5.3)
SODIUM SERPL-SCNC: 139 MMOL/L — SIGNIFICANT CHANGE UP (ref 135–145)

## 2025-08-14 PROCEDURE — 99239 HOSP IP/OBS DSCHRG MGMT >30: CPT

## 2025-08-14 PROCEDURE — 99233 SBSQ HOSP IP/OBS HIGH 50: CPT

## 2025-08-14 RX ORDER — LOSARTAN POTASSIUM 100 MG/1
50 TABLET, FILM COATED ORAL DAILY
Refills: 0 | Status: DISCONTINUED | OUTPATIENT
Start: 2025-08-14 | End: 2025-08-14

## 2025-08-14 RX ORDER — METOPROLOL SUCCINATE 50 MG/1
0.5 TABLET, EXTENDED RELEASE ORAL
Qty: 15 | Refills: 0
Start: 2025-08-14 | End: 2025-09-12

## 2025-08-14 RX ORDER — CHLORTHALIDONE 25 MG/1
1 TABLET ORAL
Refills: 0 | DISCHARGE

## 2025-08-14 RX ADMIN — METOPROLOL SUCCINATE 12.5 MILLIGRAM(S): 50 TABLET, EXTENDED RELEASE ORAL at 10:06

## 2025-08-14 RX ADMIN — LOSARTAN POTASSIUM 50 MILLIGRAM(S): 100 TABLET, FILM COATED ORAL at 10:06

## 2025-08-14 RX ADMIN — EZETIMIBE 10 MILLIGRAM(S): 10 TABLET ORAL at 10:06

## 2025-08-14 RX ADMIN — Medication 40 MILLIGRAM(S): at 04:52

## 2025-08-14 RX ADMIN — Medication 81 MILLIGRAM(S): at 10:06

## 2025-08-14 RX ADMIN — Medication 250 MILLILITER(S): at 04:25

## 2025-08-14 RX ADMIN — Medication 1 DOSE(S): at 08:16

## 2025-08-19 DIAGNOSIS — E10.22 TYPE 1 DIABETES MELLITUS WITH DIABETIC CHRONIC KIDNEY DISEASE: ICD-10-CM

## 2025-08-19 DIAGNOSIS — I12.9 HYPERTENSIVE CHRONIC KIDNEY DISEASE WITH STAGE 1 THROUGH STAGE 4 CHRONIC KIDNEY DISEASE, OR UNSPECIFIED CHRONIC KIDNEY DISEASE: ICD-10-CM

## 2025-08-19 DIAGNOSIS — Z88.8 ALLERGY STATUS TO OTHER DRUGS, MEDICAMENTS AND BIOLOGICAL SUBSTANCES: ICD-10-CM

## 2025-08-19 DIAGNOSIS — N18.30 CHRONIC KIDNEY DISEASE, STAGE 3 UNSPECIFIED: ICD-10-CM

## 2025-08-19 DIAGNOSIS — G47.33 OBSTRUCTIVE SLEEP APNEA (ADULT) (PEDIATRIC): ICD-10-CM

## 2025-08-19 DIAGNOSIS — K21.9 GASTRO-ESOPHAGEAL REFLUX DISEASE WITHOUT ESOPHAGITIS: ICD-10-CM

## 2025-08-19 DIAGNOSIS — R79.89 OTHER SPECIFIED ABNORMAL FINDINGS OF BLOOD CHEMISTRY: ICD-10-CM

## 2025-08-19 DIAGNOSIS — Z90.79 ACQUIRED ABSENCE OF OTHER GENITAL ORGAN(S): ICD-10-CM

## 2025-08-19 DIAGNOSIS — E87.6 HYPOKALEMIA: ICD-10-CM

## 2025-08-19 DIAGNOSIS — Z88.6 ALLERGY STATUS TO ANALGESIC AGENT: ICD-10-CM

## 2025-08-19 DIAGNOSIS — R07.89 OTHER CHEST PAIN: ICD-10-CM

## 2025-08-19 DIAGNOSIS — E66.9 OBESITY, UNSPECIFIED: ICD-10-CM

## 2025-08-19 DIAGNOSIS — E78.5 HYPERLIPIDEMIA, UNSPECIFIED: ICD-10-CM

## 2025-08-19 DIAGNOSIS — Z96.41 PRESENCE OF INSULIN PUMP (EXTERNAL) (INTERNAL): ICD-10-CM

## 2025-08-19 DIAGNOSIS — R00.2 PALPITATIONS: ICD-10-CM

## 2025-08-19 DIAGNOSIS — Z88.0 ALLERGY STATUS TO PENICILLIN: ICD-10-CM

## 2025-08-19 DIAGNOSIS — I25.10 ATHEROSCLEROTIC HEART DISEASE OF NATIVE CORONARY ARTERY WITHOUT ANGINA PECTORIS: ICD-10-CM

## 2025-08-19 DIAGNOSIS — I47.19 OTHER SUPRAVENTRICULAR TACHYCARDIA: ICD-10-CM

## 2025-08-19 DIAGNOSIS — J45.909 UNSPECIFIED ASTHMA, UNCOMPLICATED: ICD-10-CM

## 2025-08-19 DIAGNOSIS — Z79.899 OTHER LONG TERM (CURRENT) DRUG THERAPY: ICD-10-CM

## 2025-08-19 DIAGNOSIS — Z79.4 LONG TERM (CURRENT) USE OF INSULIN: ICD-10-CM

## 2025-08-19 DIAGNOSIS — I47.29 OTHER VENTRICULAR TACHYCARDIA: ICD-10-CM

## 2025-08-22 ENCOUNTER — APPOINTMENT (OUTPATIENT)
Dept: UROLOGY | Facility: CLINIC | Age: 65
End: 2025-08-22

## 2025-08-22 VITALS
RESPIRATION RATE: 18 BRPM | BODY MASS INDEX: 39.51 KG/M2 | HEIGHT: 70 IN | OXYGEN SATURATION: 96 % | SYSTOLIC BLOOD PRESSURE: 154 MMHG | HEART RATE: 81 BPM | WEIGHT: 276 LBS | DIASTOLIC BLOOD PRESSURE: 78 MMHG

## 2025-08-22 DIAGNOSIS — N28.1 CYST OF KIDNEY, ACQUIRED: ICD-10-CM

## 2025-08-22 DIAGNOSIS — N40.1 BENIGN PROSTATIC HYPERPLASIA WITH LOWER URINARY TRACT SYMPMS: ICD-10-CM

## 2025-08-22 DIAGNOSIS — R97.20 ELEVATED PROSTATE, SPECIFIC ANTIGEN [PSA]: ICD-10-CM

## 2025-08-22 DIAGNOSIS — N13.8 BENIGN PROSTATIC HYPERPLASIA WITH LOWER URINARY TRACT SYMPMS: ICD-10-CM

## 2025-08-22 PROCEDURE — 99214 OFFICE O/P EST MOD 30 MIN: CPT

## 2025-08-25 ENCOUNTER — APPOINTMENT (OUTPATIENT)
Dept: INTERNAL MEDICINE | Facility: CLINIC | Age: 65
End: 2025-08-25

## (undated) DEVICE — SOL IRR POUR H2O 250ML

## (undated) DEVICE — DRAPE TOWEL BLUE 17" X 24"

## (undated) DEVICE — GLV 7 PROTEXIS (BLUE)

## (undated) DEVICE — GLV 6.5 PROTEXIS (WHITE)

## (undated) DEVICE — VENODYNE/SCD SLEEVE CALF MEDIUM

## (undated) DEVICE — MEDICATION LABELS W MARKER

## (undated) DEVICE — SOL IRR POUR NS 0.9% 500ML

## (undated) DEVICE — WARMING BLANKET LOWER ADULT

## (undated) DEVICE — DRAPE THYROID 77" X 123"

## (undated) DEVICE — DRSG TEGADERM + PAD 3.5X4"

## (undated) DEVICE — Device

## (undated) DEVICE — DRSG STERISTRIPS 0.5 X 4"

## (undated) DEVICE — SPECIMEN CONTAINER 100ML

## (undated) DEVICE — DRAPE INSTRUMENT POUCH 6.75" X 11"

## (undated) DEVICE — SUT POLYSORB 3-0 30" V-20 UNDYED

## (undated) DEVICE — PACK MINOR

## (undated) DEVICE — DRSG MASTISOL